# Patient Record
Sex: FEMALE | Race: WHITE | Employment: FULL TIME | URBAN - METROPOLITAN AREA
[De-identification: names, ages, dates, MRNs, and addresses within clinical notes are randomized per-mention and may not be internally consistent; named-entity substitution may affect disease eponyms.]

---

## 2017-03-23 RX ORDER — PROPRANOLOL HYDROCHLORIDE 80 MG/1
CAPSULE, EXTENDED RELEASE ORAL
Qty: 90 CAPSULE | Refills: 0 | Status: SHIPPED | OUTPATIENT
Start: 2017-03-23 | End: 2017-09-15 | Stop reason: SDUPTHER

## 2017-03-23 RX ORDER — NAPROXEN 500 MG/1
TABLET ORAL
Qty: 180 TABLET | Refills: 3 | Status: ON HOLD | OUTPATIENT
Start: 2017-03-23 | End: 2017-10-04 | Stop reason: HOSPADM

## 2017-05-22 RX ORDER — TRAZODONE HYDROCHLORIDE 100 MG/1
TABLET ORAL
Qty: 90 TABLET | Refills: 0 | Status: SHIPPED | OUTPATIENT
Start: 2017-05-22 | End: 2017-09-15 | Stop reason: SDUPTHER

## 2017-05-22 RX ORDER — HYDROCHLOROTHIAZIDE 25 MG/1
TABLET ORAL
Qty: 90 TABLET | Refills: 0 | Status: SHIPPED | OUTPATIENT
Start: 2017-05-22 | End: 2017-09-15 | Stop reason: SDUPTHER

## 2017-06-07 DIAGNOSIS — Z78.0 MENOPAUSE: ICD-10-CM

## 2017-06-07 RX ORDER — CLONIDINE HYDROCHLORIDE 0.2 MG/1
TABLET ORAL
Qty: 30 TABLET | Refills: 2 | Status: SHIPPED | OUTPATIENT
Start: 2017-06-07 | End: 2017-07-18 | Stop reason: SDUPTHER

## 2017-06-27 ENCOUNTER — HOSPITAL ENCOUNTER (OUTPATIENT)
Dept: MAMMOGRAPHY | Age: 52
Discharge: HOME | End: 2017-06-27
Attending: SURGERY | Admitting: SURGERY

## 2017-06-27 ENCOUNTER — OFFICE VISIT (OUTPATIENT)
Dept: SURGERY | Age: 52
End: 2017-06-27

## 2017-06-27 VITALS
WEIGHT: 238 LBS | BODY MASS INDEX: 40.63 KG/M2 | HEART RATE: 80 BPM | DIASTOLIC BLOOD PRESSURE: 62 MMHG | SYSTOLIC BLOOD PRESSURE: 104 MMHG | HEIGHT: 64 IN

## 2017-06-27 DIAGNOSIS — N64.4 BREAST PAIN: ICD-10-CM

## 2017-06-27 DIAGNOSIS — N60.19 FIBROCYSTIC BREAST, UNSPECIFIED LATERALITY: Primary | ICD-10-CM

## 2017-06-27 DIAGNOSIS — Z12.31 VISIT FOR SCREENING MAMMOGRAM: ICD-10-CM

## 2017-06-27 DIAGNOSIS — Z98.890 HISTORY OF BREAST SURGERY: ICD-10-CM

## 2017-06-27 DIAGNOSIS — N60.91 ATYPICAL DUCTAL HYPERPLASIA OF RIGHT BREAST: ICD-10-CM

## 2017-06-27 DIAGNOSIS — Z91.89 AT HIGH RISK FOR BREAST CANCER: ICD-10-CM

## 2017-06-27 DIAGNOSIS — Z13.9 SCREENING: ICD-10-CM

## 2017-06-27 DIAGNOSIS — Z12.39 SCREENING BREAST EXAMINATION: ICD-10-CM

## 2017-06-27 PROCEDURE — 99213 OFFICE O/P EST LOW 20 MIN: CPT | Performed by: SURGERY

## 2017-07-05 ENCOUNTER — PATIENT MESSAGE (OUTPATIENT)
Dept: INTERNAL MEDICINE CLINIC | Age: 52
End: 2017-07-05

## 2017-07-05 DIAGNOSIS — E04.2 MULTIPLE THYROID NODULES: Primary | ICD-10-CM

## 2017-07-18 ENCOUNTER — OFFICE VISIT (OUTPATIENT)
Dept: GYNECOLOGY | Age: 52
End: 2017-07-18

## 2017-07-18 VITALS
BODY MASS INDEX: 40.97 KG/M2 | RESPIRATION RATE: 17 BRPM | HEIGHT: 64 IN | WEIGHT: 240 LBS | SYSTOLIC BLOOD PRESSURE: 124 MMHG | DIASTOLIC BLOOD PRESSURE: 61 MMHG | TEMPERATURE: 97 F | HEART RATE: 75 BPM

## 2017-07-18 DIAGNOSIS — Z78.0 MENOPAUSE: ICD-10-CM

## 2017-07-18 DIAGNOSIS — N81.6 RECTOCELE: ICD-10-CM

## 2017-07-18 DIAGNOSIS — N81.11 MIDLINE CYSTOCELE: ICD-10-CM

## 2017-07-18 DIAGNOSIS — R10.33 PERIUMBILICAL ABDOMINAL PAIN: ICD-10-CM

## 2017-07-18 DIAGNOSIS — Z01.419 WELL WOMAN EXAM WITH ROUTINE GYNECOLOGICAL EXAM: Primary | ICD-10-CM

## 2017-07-18 PROCEDURE — 99396 PREV VISIT EST AGE 40-64: CPT | Performed by: OBSTETRICS & GYNECOLOGY

## 2017-07-18 RX ORDER — CLONIDINE HYDROCHLORIDE 0.2 MG/1
0.2 TABLET ORAL DAILY
Qty: 90 TABLET | Refills: 3 | Status: SHIPPED | OUTPATIENT
Start: 2017-07-18 | End: 2018-06-23 | Stop reason: SDUPTHER

## 2017-07-18 ASSESSMENT — ENCOUNTER SYMPTOMS
RESPIRATORY NEGATIVE: 1
ABDOMINAL PAIN: 1
EYES NEGATIVE: 1

## 2017-07-20 LAB
HPV COMMENT: NORMAL
HPV TYPE 16: NOT DETECTED
HPV TYPE 18: NOT DETECTED
HPVOH (OTHER TYPES): NOT DETECTED

## 2017-08-21 ENCOUNTER — OFFICE VISIT (OUTPATIENT)
Dept: SURGERY | Age: 52
End: 2017-08-21

## 2017-08-21 VITALS
TEMPERATURE: 98.6 F | BODY MASS INDEX: 40.97 KG/M2 | DIASTOLIC BLOOD PRESSURE: 68 MMHG | HEIGHT: 64 IN | SYSTOLIC BLOOD PRESSURE: 99 MMHG | WEIGHT: 240 LBS

## 2017-08-21 DIAGNOSIS — R10.33 PERIUMBILICAL ABDOMINAL PAIN: Primary | ICD-10-CM

## 2017-08-21 PROCEDURE — 99024 POSTOP FOLLOW-UP VISIT: CPT | Performed by: SURGERY

## 2017-08-22 DIAGNOSIS — R10.33 PERIUMBILICAL PAIN: Primary | ICD-10-CM

## 2017-09-01 ENCOUNTER — HOSPITAL ENCOUNTER (OUTPATIENT)
Dept: CT IMAGING | Age: 52
Discharge: HOME | End: 2017-09-01
Attending: SURGERY | Admitting: SURGERY

## 2017-09-01 ENCOUNTER — HOSPITAL ENCOUNTER (OUTPATIENT)
Dept: ULTRASOUND IMAGING | Age: 52
Discharge: HOME | End: 2017-09-01
Attending: INTERNAL MEDICINE | Admitting: INTERNAL MEDICINE

## 2017-09-01 DIAGNOSIS — E04.2 NONTOXIC MULTINODULAR GOITER: ICD-10-CM

## 2017-09-01 DIAGNOSIS — R10.33 PERIUMBILICAL PAIN: ICD-10-CM

## 2017-09-01 DIAGNOSIS — E04.2 MULTIPLE THYROID NODULES: ICD-10-CM

## 2017-09-06 ENCOUNTER — TELEPHONE (OUTPATIENT)
Dept: BARIATRICS/WEIGHT MGMT | Age: 52
End: 2017-09-06

## 2017-09-06 ENCOUNTER — TELEPHONE (OUTPATIENT)
Dept: SURGERY | Age: 52
End: 2017-09-06

## 2017-09-18 ENCOUNTER — OFFICE VISIT (OUTPATIENT)
Dept: SURGERY | Age: 52
End: 2017-09-18

## 2017-09-18 VITALS
DIASTOLIC BLOOD PRESSURE: 60 MMHG | TEMPERATURE: 98.1 F | BODY MASS INDEX: 40.97 KG/M2 | WEIGHT: 240 LBS | SYSTOLIC BLOOD PRESSURE: 122 MMHG | HEIGHT: 64 IN

## 2017-09-18 DIAGNOSIS — R10.33 PERIUMBILICAL PAIN: Primary | ICD-10-CM

## 2017-09-18 PROCEDURE — 99024 POSTOP FOLLOW-UP VISIT: CPT | Performed by: SURGERY

## 2017-09-25 ENCOUNTER — TELEPHONE (OUTPATIENT)
Dept: INTERNAL MEDICINE CLINIC | Age: 52
End: 2017-09-25

## 2017-09-26 RX ORDER — HYDROCHLOROTHIAZIDE 25 MG/1
TABLET ORAL
Qty: 90 TABLET | Refills: 3 | Status: SHIPPED | OUTPATIENT
Start: 2017-09-26 | End: 2018-11-19 | Stop reason: SDUPTHER

## 2017-09-26 RX ORDER — OMEPRAZOLE 40 MG/1
CAPSULE, DELAYED RELEASE ORAL
Qty: 90 CAPSULE | Refills: 0 | Status: SHIPPED | OUTPATIENT
Start: 2017-09-26 | End: 2017-11-09 | Stop reason: SDUPTHER

## 2017-09-26 RX ORDER — GABAPENTIN 100 MG/1
CAPSULE ORAL
Qty: 180 CAPSULE | Refills: 3 | Status: SHIPPED | OUTPATIENT
Start: 2017-09-26 | End: 2018-09-15 | Stop reason: SDUPTHER

## 2017-09-26 RX ORDER — FENOFIBRATE 160 MG/1
TABLET ORAL
Qty: 90 TABLET | Refills: 3 | Status: SHIPPED | OUTPATIENT
Start: 2017-09-26 | End: 2018-11-19 | Stop reason: SDUPTHER

## 2017-09-26 RX ORDER — TRAZODONE HYDROCHLORIDE 100 MG/1
TABLET ORAL
Qty: 90 TABLET | Refills: 3 | Status: SHIPPED | OUTPATIENT
Start: 2017-09-26 | End: 2018-11-19 | Stop reason: SDUPTHER

## 2017-09-26 RX ORDER — PROPRANOLOL HYDROCHLORIDE 80 MG/1
CAPSULE, EXTENDED RELEASE ORAL
Qty: 90 CAPSULE | Refills: 0 | Status: SHIPPED | OUTPATIENT
Start: 2017-09-26 | End: 2017-11-09 | Stop reason: SDUPTHER

## 2017-10-03 PROBLEM — K52.9 COLITIS, ENTERITIS, AND GASTROENTERITIS OF PRESUMED INFECTIOUS ORIGIN: Status: ACTIVE | Noted: 2017-10-03

## 2017-10-25 ENCOUNTER — OFFICE VISIT (OUTPATIENT)
Dept: INTERNAL MEDICINE CLINIC | Age: 52
End: 2017-10-25

## 2017-10-25 VITALS
HEART RATE: 72 BPM | RESPIRATION RATE: 12 BRPM | WEIGHT: 234 LBS | HEIGHT: 64 IN | BODY MASS INDEX: 39.95 KG/M2 | DIASTOLIC BLOOD PRESSURE: 73 MMHG | SYSTOLIC BLOOD PRESSURE: 125 MMHG

## 2017-10-25 DIAGNOSIS — I10 BENIGN ESSENTIAL HTN: ICD-10-CM

## 2017-10-25 DIAGNOSIS — K21.9 GASTROESOPHAGEAL REFLUX DISEASE WITHOUT ESOPHAGITIS: ICD-10-CM

## 2017-10-25 DIAGNOSIS — G35 MULTIPLE SCLEROSIS (HCC): ICD-10-CM

## 2017-10-25 DIAGNOSIS — F33.1 MAJOR DEPRESSIVE DISORDER, RECURRENT EPISODE, MODERATE (HCC): ICD-10-CM

## 2017-10-25 DIAGNOSIS — E78.5 DYSLIPIDEMIA: Primary | ICD-10-CM

## 2017-10-25 DIAGNOSIS — E55.9 VITAMIN D DEFICIENCY: ICD-10-CM

## 2017-10-25 DIAGNOSIS — E83.42 HYPOMAGNESEMIA: ICD-10-CM

## 2017-10-25 DIAGNOSIS — R10.9 RIGHT SIDED ABDOMINAL PAIN: ICD-10-CM

## 2017-10-25 DIAGNOSIS — G47.33 OSA (OBSTRUCTIVE SLEEP APNEA): ICD-10-CM

## 2017-10-25 DIAGNOSIS — M79.7 FIBROMYALGIA: ICD-10-CM

## 2017-10-25 PROBLEM — K52.9 COLITIS, ENTERITIS, AND GASTROENTERITIS OF PRESUMED INFECTIOUS ORIGIN: Status: RESOLVED | Noted: 2017-10-03 | Resolved: 2017-10-25

## 2017-10-25 LAB
A/G RATIO: 1.1 (ref 1.1–2.2)
ALBUMIN SERPL-MCNC: 4.2 G/DL (ref 3.4–5)
ALP BLD-CCNC: 78 U/L (ref 40–129)
ALT SERPL-CCNC: 38 U/L (ref 10–40)
ANION GAP SERPL CALCULATED.3IONS-SCNC: 14 MMOL/L (ref 3–16)
AST SERPL-CCNC: 36 U/L (ref 15–37)
BASOPHILS ABSOLUTE: 0.1 K/UL (ref 0–0.2)
BASOPHILS RELATIVE PERCENT: 1.5 %
BILIRUB SERPL-MCNC: <0.2 MG/DL (ref 0–1)
BUN BLDV-MCNC: 14 MG/DL (ref 7–20)
CALCIUM SERPL-MCNC: 9.9 MG/DL (ref 8.3–10.6)
CHLORIDE BLD-SCNC: 101 MMOL/L (ref 99–110)
CHOLESTEROL, TOTAL: 163 MG/DL (ref 0–199)
CO2: 26 MMOL/L (ref 21–32)
CREAT SERPL-MCNC: 0.9 MG/DL (ref 0.6–1.1)
EOSINOPHILS ABSOLUTE: 0.1 K/UL (ref 0–0.6)
EOSINOPHILS RELATIVE PERCENT: 1.8 %
GFR AFRICAN AMERICAN: >60
GFR NON-AFRICAN AMERICAN: >60
GLOBULIN: 3.8 G/DL
GLUCOSE BLD-MCNC: 93 MG/DL (ref 70–99)
HCT VFR BLD CALC: 36.6 % (ref 36–48)
HDLC SERPL-MCNC: 32 MG/DL (ref 40–60)
HEMOGLOBIN: 11.9 G/DL (ref 12–16)
LDL CHOLESTEROL CALCULATED: 98 MG/DL
LYMPHOCYTES ABSOLUTE: 2.6 K/UL (ref 1–5.1)
LYMPHOCYTES RELATIVE PERCENT: 51.6 %
MAGNESIUM: 1.9 MG/DL (ref 1.8–2.4)
MCH RBC QN AUTO: 27.5 PG (ref 26–34)
MCHC RBC AUTO-ENTMCNC: 32.4 G/DL (ref 31–36)
MCV RBC AUTO: 84.9 FL (ref 80–100)
MONOCYTES ABSOLUTE: 0.6 K/UL (ref 0–1.3)
MONOCYTES RELATIVE PERCENT: 12.6 %
NEUTROPHILS ABSOLUTE: 1.6 K/UL (ref 1.7–7.7)
NEUTROPHILS RELATIVE PERCENT: 32.5 %
PDW BLD-RTO: 14.2 % (ref 12.4–15.4)
PLATELET # BLD: 237 K/UL (ref 135–450)
PMV BLD AUTO: 8.1 FL (ref 5–10.5)
POTASSIUM SERPL-SCNC: 4.4 MMOL/L (ref 3.5–5.1)
RBC # BLD: 4.31 M/UL (ref 4–5.2)
SODIUM BLD-SCNC: 141 MMOL/L (ref 136–145)
TOTAL PROTEIN: 8 G/DL (ref 6.4–8.2)
TRIGL SERPL-MCNC: 163 MG/DL (ref 0–150)
TSH SERPL DL<=0.05 MIU/L-ACNC: 3.77 UIU/ML (ref 0.27–4.2)
VITAMIN D 25-HYDROXY: 45.6 NG/ML
VLDLC SERPL CALC-MCNC: 33 MG/DL
WBC # BLD: 5 K/UL (ref 4–11)

## 2017-10-25 PROCEDURE — 99214 OFFICE O/P EST MOD 30 MIN: CPT | Performed by: INTERNAL MEDICINE

## 2017-10-25 ASSESSMENT — PATIENT HEALTH QUESTIONNAIRE - PHQ9
2. FEELING DOWN, DEPRESSED OR HOPELESS: 0
SUM OF ALL RESPONSES TO PHQ QUESTIONS 1-9: 0
SUM OF ALL RESPONSES TO PHQ9 QUESTIONS 1 & 2: 0
1. LITTLE INTEREST OR PLEASURE IN DOING THINGS: 0

## 2017-10-25 NOTE — PROGRESS NOTES
Component Value Date    LDLCALC 78 07/12/2016   patient remains compliant with fenofibrate. She is not on a statin. Her last lipid profile was in July. Triglycerides were elevated to 266     MS-- She is getting Plegridy (Peginterferon). She is seeing Dr. Jes Almonte with [facility]. She feels she is doing well. She denies exacerbations. She can get vertigo, facial twitching, visual disturbance.          Past Medical History:   Diagnosis Date    Anxiety 1/14/2014    Asthma     seasonal asthma    Cellulitis, lip     mrsa    Cervical spinal stenosis     Clotting disorder (HCC)     blood clot in leg as teenager bcp    Crohn's disease ([address]     DDD (degenerative disc disease), cervical     DDD (degenerative disc disease), lumbar     Depression 1/14/2014    DVT, lower extremity ([address] 1979    Due to birth control pills    GERD (gastroesophageal reflux disease)     Hormone disorder     Hx MRSA infection     pt states she has had several episodes of mrsa infections recently on stomach, which was tx'ed w/ antibiotics and is now (4/2013) scabbed over      Hyperlipidemia     Hypertension     Insomnia     interruption insomnia    Migraine     MS (multiple sclerosis) ([address] 6/2010    Multinodular goiter     Nausea & vomiting     Osteoarthritis     hips and shoulders    PONV (postoperative nausea and vomiting)     severe    Rash     Right leg DVT ([address]     10years of age   Kim Stack Sleep apnea     CPAP set at 15, 11    Spondylolisthesis of lumbar region     Thyroid nodule 6/13/2013    Urinary incontinence     Venous insufficiency 03/08/13    insufficiency & reflux of legs    Weakness of left arm     DUE TO MS       Review of Systems -   As per HPI      OBJECTIVE:  /73   Pulse 72   Resp 12   Ht 5' 4\" (1.626 m)   Wt 234 lb (106.1 kg)   BMI 40.17 kg/m²     Wt Readings from Last 3 Encounters:   10/25/17 234 lb (106.1 kg)   10/03/17 230 lb (104.3 kg)   09/18/17 240 lb (108.9 kg)       GEN: NAD, A&O, Non-toxic  HEENT: NC/AT, ADELINA, EOMI, Oral cavity Clear,  TM's NL. Tongue midline. Oral mucosa without lesions   NECK: Supple. No thyromegaly or nodules. No soft tissue masses. No JVD. Trachea midline. LYMPH: No C/SC nodes. CV: Regular rhythm. Rate normal.  No murmurs. No ectopy. PULM: CTA, no wheezing. No rales  EXT: No Edema  GI: Abdomen is soft, non-distended. No reproducible tenderness. The abdomen is obese. No obvious masses noted. VASC:  No carotid bruits. Pedal Pulses symmetric  MS: Symmetric tender points throughout consistent with fibromyalgia. SKIN:  No rashes of concern  NEURO: no focal or lateralizing deficits. Cranial nerves II through XII are intact. ASSESSMENT/PLAN:    1. Dyslipidemia  Condition stability is uncertain. Overdue for lab  Continue fenofibrate. Continue lifestyle approach with low-fat and low carb diet  - Comprehensive Metabolic Panel  - Lipid Panel  - TSH without Reflex    2. Vitamin D deficiency    - Vitamin D 25 Hydroxy    3. Benign essential HTN  Condition is well controlled  Continue current medications  Continue no added salt diet  Continue to observe for hypotensive symptoms  - Comprehensive Metabolic Panel  - Lipid Panel  - CBC Auto Differential    4. Multiple sclerosis ([address]  Condition is stable  Follow-up with neurology as scheduled    5. Fibromyalgia  Condition is improved since starting Cymbalta  Continue Cymbalta  Continued to increase physical activity. Try to get back to swimming    6. DEVIN (obstructive sleep apnea)  Condition is well controlled  Follow-up with sleep specialist as scheduled  Continue current CPAP settings    7. Major depressive disorder, recurrent episode, moderate (HCC)  Condition well controlled despite multiple stressors  Continue current medication    8. Right sided abdominal pain  Etiology is unclear  Recommended she follow up with Dr. Mckeon her gastroenterologist    9. Hypomagnesemia    - MAGNESIUM    10.  GERD  Condition is controlled  Continue Prilosec for now  We'll need to discuss possibly downgrading therapy to H2 blocker.   She would like to wait for now

## 2017-10-25 NOTE — PATIENT INSTRUCTIONS
Follow-up with gastroenterologist should the right sided abdominal discomfort continue. You may be in need of an upper endoscopy or colonoscopy. Also since she had a partial small bowel obstruction I think he would be interested in seeing you. Patient Education        Learning About High Blood Pressure  What is high blood pressure? Blood pressure is a measure of how hard the blood pushes against the walls of your arteries. It's normal for blood pressure to go up and down throughout the day, but if it stays up, you have high blood pressure. Another name for high blood pressure is hypertension. Two numbers tell you your blood pressure. The first number is the systolic pressure. It shows how hard the blood pushes when your heart is pumping. The second number is the diastolic pressure. It shows how hard the blood pushes between heartbeats, when your heart is relaxed and filling with blood. A blood pressure of less than 120/80 (say \"120 over 80\") is ideal for an adult. High blood pressure is 140/90 or higher. You have high blood pressure if your top number is 140 or higher or your bottom number is 90 or higher, or both. Many people fall into the category in between, called prehypertension. People with prehypertension need to make lifestyle changes to bring their blood pressure down and help prevent or delay high blood pressure. What happens when you have high blood pressure? · Blood flows through your arteries with too much force. Over time, this damages the walls of your arteries. But you can't feel it. High blood pressure usually doesn't cause symptoms. · Fat and calcium start to build up in your arteries. This buildup is called plaque. Plaque makes your arteries narrower and stiffer. Blood can't flow through them as easily. · This lack of good blood flow starts to damage some of the organs in your body.  This can lead to problems such as coronary artery disease and heart attack, heart failure, stroke, kidney failure, and eye damage. How can you prevent high blood pressure? · Stay at a healthy weight. · Try to limit how much sodium you eat to less than 2,300 milligrams (mg) a day. If you limit your sodium to 1,500 mg a day, you can lower your blood pressure even more. ¨ Buy foods that are labeled \"unsalted,\" \"sodium-free,\" or \"low-sodium. \" Foods labeled \"reduced-sodium\" and \"light sodium\" may still have too much sodium. ¨ Flavor your food with garlic, lemon juice, onion, vinegar, herbs, and spices instead of salt. Do not use soy sauce, steak sauce, onion salt, garlic salt, mustard, or ketchup on your food. ¨ Use less salt (or none) when recipes call for it. You can often use half the salt a recipe calls for without losing flavor. · Be physically active. Get at least 30 minutes of exercise on most days of the week. Walking is a good choice. You also may want to do other activities, such as running, swimming, cycling, or playing tennis or team sports. · Limit alcohol to 2 drinks a day for men and 1 drink a day for women. · Eat plenty of fruits, vegetables, and low-fat dairy products. Eat less saturated and total fats. How is high blood pressure treated? · Your doctor will suggest making lifestyle changes. For example, your doctor may ask you to eat healthy foods, quit smoking, lose extra weight, and be more active. · If lifestyle changes don't help enough or your blood pressure is very high, you will have to take medicine every day. Follow-up care is a key part of your treatment and safety. Be sure to make and go to all appointments, and call your doctor if you are having problems. It's also a good idea to know your test results and keep a list of the medicines you take. Where can you learn more? Go to https://araceli.healthEmergent Game Technologies. org and sign in to your [employer] account. Enter P501 in the [employer] box to learn more about \"Learning About High Blood Pressure. \"     If you do not have an account, please click on the \"Sign Up Now\" link. Current as of: March 23, 2016  Content Version: 11.3  © 6967-7272 [employer]. Care instructions adapted under license by [facility] ([facility]). If you have questions about a medical condition or this instruction, always ask your healthcare professional. [address] any warranty or liability for your use of this information.

## 2017-11-02 DIAGNOSIS — K21.9 GASTROESOPHAGEAL REFLUX DISEASE, ESOPHAGITIS PRESENCE NOT SPECIFIED: Primary | ICD-10-CM

## 2018-03-09 ENCOUNTER — HOSPITAL ENCOUNTER (OUTPATIENT)
Dept: MRI IMAGING | Age: 53
Discharge: HOME | End: 2018-03-09
Attending: SURGERY | Admitting: SURGERY

## 2018-03-09 DIAGNOSIS — Z91.89 OTHER SPECIFIED PERSONAL RISK FACTORS, NOT ELSEWHERE CLASSIFIED: ICD-10-CM

## 2018-03-16 ENCOUNTER — HOSPITAL ENCOUNTER (OUTPATIENT)
Dept: MRI IMAGING | Age: 53
Discharge: HOME | End: 2018-03-16
Attending: SURGERY | Admitting: SURGERY

## 2018-03-16 DIAGNOSIS — Z91.89 AT HIGH RISK FOR BREAST CANCER: ICD-10-CM

## 2018-03-16 DIAGNOSIS — N60.91 BENIGN MAMMARY DYSPLASIA OF RIGHT BREAST: ICD-10-CM

## 2018-03-16 DIAGNOSIS — N60.91 ATYPICAL DUCTAL HYPERPLASIA OF RIGHT BREAST: ICD-10-CM

## 2018-03-23 RX ORDER — OMEPRAZOLE 40 MG/1
CAPSULE, DELAYED RELEASE ORAL
Qty: 90 CAPSULE | Refills: 0 | Status: SHIPPED | OUTPATIENT
Start: 2018-03-23 | End: 2018-07-02 | Stop reason: SDUPTHER

## 2018-03-23 RX ORDER — PROPRANOLOL HYDROCHLORIDE 80 MG/1
CAPSULE, EXTENDED RELEASE ORAL
Qty: 90 CAPSULE | Refills: 0 | Status: SHIPPED | OUTPATIENT
Start: 2018-03-23 | End: 2018-07-02 | Stop reason: SDUPTHER

## 2018-03-23 RX ORDER — NAPROXEN 500 MG/1
TABLET ORAL
Qty: 180 TABLET | Refills: 3 | OUTPATIENT
Start: 2018-03-23

## 2018-06-06 ENCOUNTER — OFFICE VISIT (OUTPATIENT)
Dept: INTERNAL MEDICINE CLINIC | Age: 53
End: 2018-06-06

## 2018-06-06 VITALS
RESPIRATION RATE: 14 BRPM | BODY MASS INDEX: 41.06 KG/M2 | WEIGHT: 239.2 LBS | DIASTOLIC BLOOD PRESSURE: 68 MMHG | SYSTOLIC BLOOD PRESSURE: 116 MMHG | HEART RATE: 60 BPM

## 2018-06-06 DIAGNOSIS — K21.9 GASTROESOPHAGEAL REFLUX DISEASE WITHOUT ESOPHAGITIS: ICD-10-CM

## 2018-06-06 DIAGNOSIS — F43.23 ADJUSTMENT DISORDER WITH MIXED ANXIETY AND DEPRESSED MOOD: ICD-10-CM

## 2018-06-06 DIAGNOSIS — I10 BENIGN ESSENTIAL HTN: Primary | ICD-10-CM

## 2018-06-06 DIAGNOSIS — F33.1 MAJOR DEPRESSIVE DISORDER, RECURRENT EPISODE, MODERATE (HCC): ICD-10-CM

## 2018-06-06 DIAGNOSIS — K80.20 CALCULUS OF GALLBLADDER WITHOUT CHOLECYSTITIS WITHOUT OBSTRUCTION: ICD-10-CM

## 2018-06-06 DIAGNOSIS — M79.7 FIBROMYALGIA: ICD-10-CM

## 2018-06-06 DIAGNOSIS — J45.20 MILD INTERMITTENT ASTHMA WITHOUT COMPLICATION: ICD-10-CM

## 2018-06-06 DIAGNOSIS — E78.5 DYSLIPIDEMIA: ICD-10-CM

## 2018-06-06 LAB
A/G RATIO: 1.2 (ref 1.1–2.2)
ALBUMIN SERPL-MCNC: 4.2 G/DL (ref 3.4–5)
ALP BLD-CCNC: 77 U/L (ref 40–129)
ALT SERPL-CCNC: 70 U/L (ref 10–40)
ANION GAP SERPL CALCULATED.3IONS-SCNC: 17 MMOL/L (ref 3–16)
AST SERPL-CCNC: 68 U/L (ref 15–37)
BILIRUB SERPL-MCNC: <0.2 MG/DL (ref 0–1)
BUN BLDV-MCNC: 21 MG/DL (ref 7–20)
CALCIUM SERPL-MCNC: 9.7 MG/DL (ref 8.3–10.6)
CHLORIDE BLD-SCNC: 103 MMOL/L (ref 99–110)
CHOLESTEROL, TOTAL: 170 MG/DL (ref 0–199)
CO2: 24 MMOL/L (ref 21–32)
CREAT SERPL-MCNC: 0.8 MG/DL (ref 0.6–1.1)
GFR AFRICAN AMERICAN: >60
GFR NON-AFRICAN AMERICAN: >60
GLOBULIN: 3.4 G/DL
GLUCOSE BLD-MCNC: 98 MG/DL (ref 70–99)
HDLC SERPL-MCNC: 37 MG/DL (ref 40–60)
LDL CHOLESTEROL CALCULATED: 99 MG/DL
POTASSIUM SERPL-SCNC: 4.5 MMOL/L (ref 3.5–5.1)
SODIUM BLD-SCNC: 144 MMOL/L (ref 136–145)
TOTAL PROTEIN: 7.6 G/DL (ref 6.4–8.2)
TRIGL SERPL-MCNC: 170 MG/DL (ref 0–150)
VLDLC SERPL CALC-MCNC: 34 MG/DL

## 2018-06-06 PROCEDURE — 99214 OFFICE O/P EST MOD 30 MIN: CPT | Performed by: INTERNAL MEDICINE

## 2018-06-19 ENCOUNTER — PATIENT MESSAGE (OUTPATIENT)
Dept: INTERNAL MEDICINE CLINIC | Age: 53
End: 2018-06-19

## 2018-06-19 ENCOUNTER — HOSPITAL ENCOUNTER (OUTPATIENT)
Dept: ULTRASOUND IMAGING | Age: 53
Discharge: HOME | End: 2018-06-19
Admitting: INTERNAL MEDICINE

## 2018-06-19 DIAGNOSIS — K80.10 CALCULUS OF GALLBLADDER WITH CHOLECYSTITIS WITHOUT BILIARY OBSTRUCTION, UNSPECIFIED CHOLECYSTITIS ACUITY: Primary | ICD-10-CM

## 2018-06-19 DIAGNOSIS — K80.20 CALCULUS OF GALLBLADDER WITHOUT CHOLECYSTITIS WITHOUT OBSTRUCTION: ICD-10-CM

## 2018-06-23 DIAGNOSIS — Z78.0 MENOPAUSE: ICD-10-CM

## 2018-06-26 RX ORDER — CLONIDINE HYDROCHLORIDE 0.2 MG/1
TABLET ORAL
Qty: 90 TABLET | Refills: 3 | Status: SHIPPED | OUTPATIENT
Start: 2018-06-26 | End: 2019-04-15 | Stop reason: SDUPTHER

## 2018-07-03 RX ORDER — PROPRANOLOL HYDROCHLORIDE 80 MG/1
CAPSULE, EXTENDED RELEASE ORAL
Qty: 90 CAPSULE | Refills: 1 | Status: SHIPPED | OUTPATIENT
Start: 2018-07-03 | End: 2018-12-13 | Stop reason: SDUPTHER

## 2018-07-03 RX ORDER — NAPROXEN 500 MG/1
TABLET ORAL
Qty: 180 TABLET | Refills: 1 | Status: SHIPPED | OUTPATIENT
Start: 2018-07-03 | End: 2018-12-13 | Stop reason: SDUPTHER

## 2018-07-03 RX ORDER — OMEPRAZOLE 40 MG/1
CAPSULE, DELAYED RELEASE ORAL
Qty: 180 CAPSULE | Refills: 1 | Status: SHIPPED | OUTPATIENT
Start: 2018-07-03 | End: 2018-11-19 | Stop reason: SDUPTHER

## 2018-08-24 ENCOUNTER — OFFICE VISIT (OUTPATIENT)
Dept: SURGERY | Age: 53
End: 2018-08-24

## 2018-08-24 VITALS
BODY MASS INDEX: 40.29 KG/M2 | DIASTOLIC BLOOD PRESSURE: 89 MMHG | SYSTOLIC BLOOD PRESSURE: 138 MMHG | HEIGHT: 64 IN | WEIGHT: 236 LBS | TEMPERATURE: 98.3 F

## 2018-08-24 DIAGNOSIS — K80.20 SYMPTOMATIC CHOLELITHIASIS: Primary | ICD-10-CM

## 2018-08-24 PROCEDURE — 99214 OFFICE O/P EST MOD 30 MIN: CPT | Performed by: SURGERY

## 2018-08-24 NOTE — LETTER
[facility] of [address] [place] [address] [phone]  f [phone]                                                                                                      Shereen Canseco MD [facility]                                        SURGERY ORDER   -- Time of order -- 18    9:22 AM    Facility:   [facility] # _________________                                  Scheduled by: ____________    Surgery Date & Time:                                                                              Pt arrival:      Patient Name:  Yash Light     :  1965 PCP:  Goapl Schmidt [address] [place] [address] Ph:    [phone] (home) [phone] (work)                                                    PROCEDURE:  Laparoscopic cholecystectomy with gram 99 [id number]    DIAGNOSIS:  Symptomatic cholelithiasis K80.20    Anesthesia: _General  Time Needed:  1 hour   Pt Position:  supine         Outpatient __x__ Admit ______  Assist._____  Pre-Op H & P to be done by: PCP     Labs Needed:   CBC ___  PT/PTT___ INR ____  CMP ___ EKG ___   Urine Hcg ___    Cardiac Clearance ___  (if Clyda Pall to be done by) __________________    Patient to meet with Anesthesiology prior to surgery ___no__     Medications to be stopped 5 days before surgery: _________  Additional / Special Orders:     **Ancef 2 gm IV OCTOR   (If patient weight is > 120 kg, give 3 gm IV OCTOR)                                                                                                                    Shereen Canseco  [address]   Fax   [phone]            Date Of Procedure:    PATIENT:       Yash Light                    :  1965      No Known Allergies    No.   PHYSICIAN ORDERS   HUC/  RN      ORDERS WITH CHECK BOXES MUST BE SELECTED. ALL OTHER ORDERS WILL BE AUTOMATICALLY INITIATED.            Date / Time of Order:   18   1:22 PM · Any paperwork needing completion for either your employer or insurance company can be faxed, mailed or dropped off at our office to my attention. Please allow 7 business days for the paperwork to be completed. You will be contacted once it has been completed at which time you will be able to pick it up or have it mailed. · NOTE: Due to HIPPA policy, completed paperwork can not be faxed and per [facility] of 9-4-29, Fees for form completion may apply. If you have any questions, please feel free to call me at the number above.

## 2018-08-24 NOTE — PROGRESS NOTES
PATIENT NAME: Wayne Kirkster OF BIRTH: 1965     TODAY'S DATE: 8/24/2018    Reason for Visit:  Postprandial pain    Requesting Physician:  Dr. Alicia Jacobson:              The patient is a 45 y.o. female who presents with complaints of postprandial pain. Worse with fattier foods and typically associated with nausea. No pain presently. Chief Complaint   Patient presents with    Surgical Consult     Gallbladder ,nausea everyday with RUQ pain       REVIEW OF SYSTEMS:  CONSTITUTIONAL:  negative  HEENT:  negative  RESPIRATORY:  negative  CARDIOVASCULAR:  negative  GASTROINTESTINAL:  negative except for abdominal pain  GENITOURINARY:  negative  HEMATOLOGIC/LYMPHATIC:  negative  NEUROLOGICAL:  negative    PHYSICAL EXAM:  VITALS:    /89   Temp 98.3 °F (36.8 °C)   Ht 5' 4\" (1.626 m)   Wt 236 lb (107 kg)   BMI 40.51 kg/m²     CONSTITUTIONAL:  alert, no apparent distress and obese  EYES:  sclera clear  ENT:  normocepalic, without obvious abnormality  NECK:  supple, symmetrical, trachea midline and no carotid bruits  LUNGS:  clear to auscultation  CARDIOVASCULAR:  regular rate and rhythm and no murmur noted  ABDOMEN:  Umbilical incision well healed, normal bowel sounds, soft, non-distended, non-tender, voluntary guarding absent, no masses palpated  MUSCULOSKELETAL:  0+ pitting edema lower extremities  NEUROLOGIC:  Mental Status Exam:  Level of Alertness:   awake  Orientation:   person, place, time  SKIN:  no bruising or bleeding and normal skin color, texture, turgor    Radiology Review:   1. Findings are equivocal for cholecystitis, as there are gallstones and a   positive sonographic Proctor's sign.  However, there is no evidence of   gallbladder wall thickening or pericholecystic fluid.  If cholecystitis   remains of clinical concern, suggest further characterization with a nuclear   medicine HIDA scan. 2. Diffuse hepatic steatosis.    3. Normal sonographic appearance of the common bile duct, right kidney, and   pancreas. IMPRESSION/RECOMMENDATIONS:    The patient has findings consistent with symptomatic cholelithiasis. As a result, we will proceed with laparoscopic cholecystectomy. The risks, benefits, and expected recovery were discussed with the patient and she wishes to proceed.      Cheko Herman

## 2018-08-24 NOTE — LETTER
[facility] [employer] of [address] [place] [address]  Phone: [phone]  Fax: [phone]    Anat Elliott MD        August 24, 2018     Avi Davis, [address] [place] [address]    Patient: Luli Hoang  MR Number: [id number]  YOB: 1965  Date of Visit: 8/24/2018    Dear Dr. Avi Davis:    Thank you for the request for consultation for Isabel Benites to me for the evaluation of postprandial pain. Below are the relevant portions of my assessment and plan of care. The patient has findings consistent with symptomatic cholelithiasis. As a result, we will proceed with laparoscopic cholecystectomy. If you have questions, please do not hesitate to call me. I look forward to following Ok Serrano along with you.     Sincerely,        Anta Elliott MD

## 2018-08-28 ENCOUNTER — TELEPHONE (OUTPATIENT)
Dept: SURGERY | Age: 53
End: 2018-08-28

## 2018-09-17 RX ORDER — BUDESONIDE AND FORMOTEROL FUMARATE DIHYDRATE 160; 4.5 UG/1; UG/1
AEROSOL RESPIRATORY (INHALATION)
Qty: 3 INHALER | Refills: 3 | Status: SHIPPED | OUTPATIENT
Start: 2018-09-17 | End: 2019-06-27 | Stop reason: SDUPTHER

## 2018-09-17 RX ORDER — GABAPENTIN 100 MG/1
CAPSULE ORAL
Qty: 180 CAPSULE | Refills: 1 | Status: SHIPPED | OUTPATIENT
Start: 2018-09-17 | End: 2019-02-07 | Stop reason: SDUPTHER

## 2018-11-13 ENCOUNTER — OFFICE VISIT (OUTPATIENT)
Dept: INTERNAL MEDICINE CLINIC | Age: 53
End: 2018-11-13
Payer: COMMERCIAL

## 2018-11-13 VITALS
DIASTOLIC BLOOD PRESSURE: 60 MMHG | HEART RATE: 72 BPM | OXYGEN SATURATION: 96 % | TEMPERATURE: 98.9 F | RESPIRATION RATE: 16 BRPM | SYSTOLIC BLOOD PRESSURE: 101 MMHG

## 2018-11-13 DIAGNOSIS — J06.9 VIRAL URI: Primary | ICD-10-CM

## 2018-11-13 DIAGNOSIS — J45.21 MILD INTERMITTENT ASTHMA WITH EXACERBATION: ICD-10-CM

## 2018-11-13 PROCEDURE — 99213 OFFICE O/P EST LOW 20 MIN: CPT | Performed by: INTERNAL MEDICINE

## 2018-11-13 RX ORDER — PROMETHAZINE HYDROCHLORIDE AND CODEINE PHOSPHATE 6.25; 1 MG/5ML; MG/5ML
5 SYRUP ORAL EVERY 4 HOURS PRN
Qty: 150 ML | Refills: 0 | Status: SHIPPED | OUTPATIENT
Start: 2018-11-13 | End: 2018-11-20

## 2018-11-13 RX ORDER — METHYLPREDNISOLONE 4 MG/1
4 TABLET ORAL SEE ADMIN INSTRUCTIONS
Qty: 1 KIT | Refills: 1 | Status: SHIPPED | OUTPATIENT
Start: 2018-11-13 | End: 2018-11-19

## 2018-11-13 RX ORDER — GUAIFENESIN AND DEXTROMETHORPHAN HYDROBROMIDE 600; 30 MG/1; MG/1
1 TABLET, EXTENDED RELEASE ORAL 2 TIMES DAILY
Qty: 28 TABLET | Refills: 0 | COMMUNITY
Start: 2018-11-13 | End: 2018-12-27 | Stop reason: ALTCHOICE

## 2018-11-13 RX ORDER — FLUTICASONE PROPIONATE 50 MCG
2 SPRAY, SUSPENSION (ML) NASAL DAILY
Qty: 1 BOTTLE | Refills: 2 | Status: ON HOLD | OUTPATIENT
Start: 2018-11-13 | End: 2020-03-04

## 2018-11-13 NOTE — PATIENT INSTRUCTIONS
quick-relief medicine with you at all times. · Talk to your doctor before using other medicines. Some medicines, such as aspirin, can cause asthma attacks in some people. · If you have a peak flow meter, use it to check how well you are breathing. This can help you predict when an asthma attack is going to occur. Then you can take medicine to prevent the asthma attack or make it less severe. · Do not smoke or allow others to smoke around you. Avoid smoky places. Smoking makes asthma worse. If you need help quitting, talk to your doctor about stop-smoking programs and medicines. These can increase your chances of quitting for good. · Learn what triggers an asthma attack for you, and avoid the triggers when you can. Common triggers include colds, smoke, air pollution, dust, pollen, mold, pets, cockroaches, stress, and cold air. · Avoid colds and the flu. Get a pneumococcal vaccine shot. If you have had one before, ask your doctor if you need a second dose. Get a flu vaccine every fall. If you must be around people with colds or the flu, wash your hands often. When should you call for help? Call 911 anytime you think you may need emergency care. For example, call if:    · You have severe trouble breathing.    Call your doctor now or seek immediate medical care if:    · Your symptoms do not get better after you have followed your asthma action plan.     · You have new or worse trouble breathing.     · Your coughing and wheezing get worse.     · You cough up dark brown or bloody mucus (sputum).     · You have a new or higher fever.    Watch closely for changes in your health, and be sure to contact your doctor if:    · You need to use quick-relief medicine on more than 2 days a week (unless it is just for exercise).     · You cough more deeply or more often, especially if you notice more mucus or a change in the color of your mucus.     · You are not getting better as expected. Where can you learn more?   Go to over-the-counter pain medicine, such as acetaminophen (Tylenol), ibuprofen (Advil, Motrin), or naproxen (Aleve), as needed for pain and fever. Read and follow all instructions on the label. Do not give aspirin to anyone younger than 20. It has been linked to Reye syndrome, a serious illness. · Drink plenty of fluids, enough so that your urine is light yellow or clear like water. Hot fluids, such as tea or soup, may help relieve congestion in your nose and throat. If you have kidney, heart, or liver disease and have to limit fluids, talk with your doctor before you increase the amount of fluids you drink. · Try to clear mucus from your lungs by breathing deeply and coughing. · Gargle with warm salt water once an hour. This can help reduce swelling and throat pain. Use 1 teaspoon of salt mixed in 1 cup of warm water. · Do not smoke or allow others to smoke around you. If you need help quitting, talk to your doctor about stop-smoking programs and medicines. These can increase your chances of quitting for good. To avoid spreading the virus  · Cough or sneeze into a tissue. Then throw the tissue away. · If you don't have a tissue, use your hand to cover your cough or sneeze. Then clean your hand. You can also cough into your sleeve. · Wash your hands often. Use soap and warm water. Wash for 15 to 20 seconds each time. · If you don't have soap and water near you, you can clean your hands with alcohol wipes or gel. When should you call for help? Call your doctor now or seek immediate medical care if:    · You have a new or higher fever.     · Your fever lasts more than 48 hours.     · You have trouble breathing.     · You have a fever with a stiff neck or a severe headache.     · You are sensitive to light.     · You feel very sleepy or confused.    Watch closely for changes in your health, and be sure to contact your doctor if:    · You do not get better as expected. Where can you learn more?   Go to

## 2018-11-19 RX ORDER — OMEPRAZOLE 40 MG/1
CAPSULE, DELAYED RELEASE ORAL
Qty: 180 CAPSULE | Refills: 1 | Status: SHIPPED | OUTPATIENT
Start: 2018-11-19 | End: 2019-04-15 | Stop reason: SDUPTHER

## 2018-11-19 RX ORDER — HYDROCHLOROTHIAZIDE 25 MG/1
TABLET ORAL
Qty: 90 TABLET | Refills: 1 | Status: SHIPPED | OUTPATIENT
Start: 2018-11-19 | End: 2019-04-02 | Stop reason: SDUPTHER

## 2018-11-19 RX ORDER — TRAZODONE HYDROCHLORIDE 100 MG/1
TABLET ORAL
Qty: 90 TABLET | Refills: 0 | Status: SHIPPED | OUTPATIENT
Start: 2018-11-19 | End: 2018-12-13 | Stop reason: SDUPTHER

## 2018-11-19 RX ORDER — FENOFIBRATE 160 MG/1
TABLET ORAL
Qty: 90 TABLET | Refills: 1 | Status: SHIPPED | OUTPATIENT
Start: 2018-11-19 | End: 2019-04-02 | Stop reason: SDUPTHER

## 2018-12-14 RX ORDER — NAPROXEN 500 MG/1
TABLET ORAL
Qty: 180 TABLET | Refills: 1 | Status: SHIPPED | OUTPATIENT
Start: 2018-12-14 | End: 2019-06-27 | Stop reason: SDUPTHER

## 2018-12-14 RX ORDER — PROPRANOLOL HYDROCHLORIDE 80 MG/1
CAPSULE, EXTENDED RELEASE ORAL
Qty: 90 CAPSULE | Refills: 1 | Status: SHIPPED | OUTPATIENT
Start: 2018-12-14 | End: 2019-06-27 | Stop reason: SDUPTHER

## 2018-12-14 RX ORDER — TRAZODONE HYDROCHLORIDE 100 MG/1
TABLET ORAL
Qty: 90 TABLET | Refills: 1 | Status: SHIPPED | OUTPATIENT
Start: 2018-12-14 | End: 2019-06-27 | Stop reason: SDUPTHER

## 2018-12-17 PROBLEM — N20.1 URETERAL STONE: Status: ACTIVE | Noted: 2018-12-16

## 2018-12-27 ENCOUNTER — OFFICE VISIT (OUTPATIENT)
Dept: INTERNAL MEDICINE CLINIC | Age: 53
End: 2018-12-27
Payer: COMMERCIAL

## 2018-12-27 VITALS
HEART RATE: 82 BPM | RESPIRATION RATE: 12 BRPM | BODY MASS INDEX: 40.68 KG/M2 | WEIGHT: 237 LBS | SYSTOLIC BLOOD PRESSURE: 124 MMHG | DIASTOLIC BLOOD PRESSURE: 80 MMHG

## 2018-12-27 DIAGNOSIS — E78.5 DYSLIPIDEMIA: ICD-10-CM

## 2018-12-27 DIAGNOSIS — N20.1 URETEROLITHIASIS: Primary | ICD-10-CM

## 2018-12-27 DIAGNOSIS — N20.0 BILATERAL KIDNEY STONES: ICD-10-CM

## 2018-12-27 DIAGNOSIS — N13.30 HYDRONEPHROSIS OF RIGHT KIDNEY: ICD-10-CM

## 2018-12-27 DIAGNOSIS — D64.9 NORMOCYTIC ANEMIA: ICD-10-CM

## 2018-12-27 DIAGNOSIS — K80.20 CALCULUS OF GALLBLADDER WITHOUT CHOLECYSTITIS WITHOUT OBSTRUCTION: ICD-10-CM

## 2018-12-27 DIAGNOSIS — I10 BENIGN ESSENTIAL HTN: ICD-10-CM

## 2018-12-27 DIAGNOSIS — N17.9 AKI (ACUTE KIDNEY INJURY) (HCC): ICD-10-CM

## 2018-12-27 PROCEDURE — 99214 OFFICE O/P EST MOD 30 MIN: CPT | Performed by: INTERNAL MEDICINE

## 2018-12-27 NOTE — PATIENT INSTRUCTIONS
increase the amount of fluids you drink. · Do not take more than the recommended daily dose of vitamins C and D.  · Limit the salt in your diet. · Eat a balanced diet that is not too high in protein. Follow-up care is a key part of your treatment and safety. Be sure to make and go to all appointments, and call your doctor if you are having problems. It's also a good idea to know your test results and keep a list of the medicines you take. Where can you learn more? Go to https://V[employer] TherapeuticspeConnectbrighteb.Connectv.com. org and sign in to your [employer] account. Enter C138 in the [employer] box to learn more about \"Learning About Diet for Kidney Stone Prevention. \"     If you do not have an account, please click on the \"Sign Up Now\" link. Current as of: March 29, 2018  Content Version: 11.8  © 4974-2324 Healthwise, Incorporated. Care instructions adapted under license by [facility] ([facility]). If you have questions about a medical condition or this instruction, always ask your healthcare professional. [address] any warranty or liability for your use of this information.

## 2018-12-27 NOTE — PROGRESS NOTES
fluticasone (FLONASE) 50 MCG/ACT nasal spray 2 sprays by Nasal route daily Yes Cheko Carpio DO   gabapentin (NEURONTIN) 100 MG capsule TAKE 1 CAPSULE TWICE DAILY Yes Cheko Carpio DO   SYMBICORT 160-4.5 MCG/ACT AERO INHALE 2 PUFFS INTO THE LUNGS 2 TIMES DAILY Yes Cheko Carpio DO   cloNIDine (CATAPRES) 0.2 MG tablet TAKE 1 TABLET EVERY DAY Yes Morelia Pastor MD   VENTOLIN  (90 Base) MCG/ACT inhaler INHALE 2 PUFFS INTO THE LUNGS EVERY 6 HOURS AS NEEDED FOR WHEEZING OR SHORTNESS OF BREATH (OR COUGHING) Yes Cheko Carpio DO   magnesium oxide (MAG-OX) 400 (241.3 MG) MG TABS tablet TAKE 1 TABLET BY MOUTH DAILY Yes Morleia Pastor MD   ferrous sulfate 325 (65 FE) MG tablet TAKE 1 TABLET EVERY DAY WITH BREAKFAST Yes Cheko Carpio DO   polyethylene glycol (MIRALAX) powder Take 17 g by mouth daily Yes Cheko Carpio DO   loratadine (CLARITIN) 10 MG tablet Take 1 tablet by mouth daily Yes Cheko Carpio DO   DULoxetine (CYMBALTA) 60 MG capsule Take 60 mg by mouth 2 times daily Yes Historical Provider, MD   Peginterferon Beta-1a (PLEGRIDY) 125 MCG/0.5ML SOPN Inject 125 mLs into the skin daily 1 shot every 2 weeks Yes Historical Provider, MD   modafinil (PROVIGIL) 100 MG tablet Take 200 mg by mouth daily  Yes Historical Provider, MD   baclofen (LIORESAL) 20 MG tablet TAKE 1 TABLET TWICE DAILY Yes Cheko Carpio DO   Cholecalciferol (D-2000 MAXIMUM STRENGTH) 2000 UNITS TABS TAKE ONE TABLET BY MOUTH ONCE DAILY Yes Cheko Carpio DO           Review of Systems - As per HPI      OBJECTIVE:  /80   Pulse 82   Resp 12   Wt 237 lb (107.5 kg)   BMI 40.68 kg/m²   GEN: NAD, A&O, Non-toxic, obese. HEENT: NC/AT, ADELINA, EOMI, anicteric, Oral cavity Clear,  TM's NL, Nasal cavity clear. NECK: Supple. No thyromegaly. No JVD  LYMPH: No C/SC nodes. CV: S1 S2 NL, RRR. No murmurs, clicks or rubs. PULM: CTA, symmentric air exchange  EXT: No edema  GI: Abdomen is soft, NT, BS+, No hepatomegaly.    NEURO: No focal or lateralizing deficits. VASC:  No carotid bruits. Pulses symmetric  MS: No CVA tenderness to percussion      ASSESSMENT/PLAN:    1. Ureterolithiasis  This is a new problem. Condition improved  Follow-up with urology  Patient will be pulling her stent this Sunday  Refer to literature for stone prevention. Push fluids    2. Hydronephrosis of right kidney  As above    3. KENAN (acute kidney injury) ([address]  Due for repeat lab. Her creatinine was not quite normal upon her discharge  Advised that she push fluids  - Comprehensive Metabolic Panel; Future    4. Bilateral kidney stones  As above    5. Calculus of gallbladder without cholecystitis without obstruction  Condition is stable and asymptomatic at this time  Continue to monitor for re-current biliary colic symptoms    6. Normocytic anemia  Repeat lab  - CBC Auto Differential; Future    7. Dyslipidemia  Condition control is uncertain. Due for lab  Continue fenofibrate for now. Consider statin therapy. - Lipid Panel; Future  - TSH without Reflex; Future    8. Benign essential HTN  Blood pressure is fairly well-controlled  Continue efforts with calorie restriction and weight loss efforts. Low-sodium diet. - CBC Auto Differential; Future  - Lipid Panel; Future  - TSH without Reflex; Future          Discussed medications with patient who voiced understanding of their use, indication and potential side effects. Pt also understands the above recommendations. All questions answered.

## 2019-02-07 RX ORDER — GABAPENTIN 100 MG/1
CAPSULE ORAL
Qty: 180 CAPSULE | Refills: 1 | Status: SHIPPED | OUTPATIENT
Start: 2019-02-07 | End: 2019-06-27 | Stop reason: SDUPTHER

## 2019-03-18 RX ORDER — ALBUTEROL SULFATE 90 UG/1
AEROSOL, METERED RESPIRATORY (INHALATION)
Qty: 3 INHALER | Refills: 3 | Status: SHIPPED | OUTPATIENT
Start: 2019-03-18 | End: 2019-12-07 | Stop reason: SDUPTHER

## 2019-04-02 RX ORDER — FENOFIBRATE 160 MG/1
TABLET ORAL
Qty: 90 TABLET | Refills: 1 | Status: SHIPPED | OUTPATIENT
Start: 2019-04-02 | End: 2020-04-28 | Stop reason: SDUPTHER

## 2019-04-02 RX ORDER — HYDROCHLOROTHIAZIDE 25 MG/1
TABLET ORAL
Qty: 90 TABLET | Refills: 1 | Status: ON HOLD | OUTPATIENT
Start: 2019-04-02 | End: 2020-03-04

## 2019-04-15 DIAGNOSIS — Z78.0 MENOPAUSE: ICD-10-CM

## 2019-04-15 RX ORDER — CLONIDINE HYDROCHLORIDE 0.2 MG/1
TABLET ORAL
Qty: 90 TABLET | Refills: 3 | Status: SHIPPED | OUTPATIENT
Start: 2019-04-15 | End: 2020-02-20

## 2019-04-15 RX ORDER — OMEPRAZOLE 40 MG/1
CAPSULE, DELAYED RELEASE ORAL
Qty: 180 CAPSULE | Refills: 1 | Status: SHIPPED | OUTPATIENT
Start: 2019-04-15 | End: 2019-09-17 | Stop reason: SDUPTHER

## 2019-05-14 ENCOUNTER — OFFICE VISIT (OUTPATIENT)
Dept: GYNECOLOGY | Age: 54
End: 2019-05-14
Payer: COMMERCIAL

## 2019-05-14 VITALS
BODY MASS INDEX: 40.97 KG/M2 | SYSTOLIC BLOOD PRESSURE: 124 MMHG | DIASTOLIC BLOOD PRESSURE: 76 MMHG | WEIGHT: 240 LBS | RESPIRATION RATE: 17 BRPM | HEIGHT: 64 IN

## 2019-05-14 DIAGNOSIS — Z87.42: ICD-10-CM

## 2019-05-14 DIAGNOSIS — N95.0 POST-MENOPAUSAL BLEEDING: ICD-10-CM

## 2019-05-14 DIAGNOSIS — Z01.419 WELL WOMAN EXAM WITH ROUTINE GYNECOLOGICAL EXAM: Primary | ICD-10-CM

## 2019-05-14 LAB
BILIRUBIN URINE: NEGATIVE
BLOOD, URINE: NEGATIVE
CLARITY: CLEAR
COLOR: NORMAL
EPITHELIAL CELLS, UA: 1 /HPF (ref 0–5)
GLUCOSE URINE: NEGATIVE MG/DL
HYALINE CASTS: 4 /LPF (ref 0–8)
KETONES, URINE: NEGATIVE MG/DL
LEUKOCYTE ESTERASE, URINE: NEGATIVE
MICROSCOPIC EXAMINATION: NORMAL
NITRITE, URINE: NEGATIVE
PH UA: 6 (ref 5–8)
PROTEIN UA: NEGATIVE MG/DL
RBC UA: 3 /HPF (ref 0–4)
SPECIFIC GRAVITY UA: >1.03 (ref 1–1.03)
UROBILINOGEN, URINE: 0.2 E.U./DL
WBC UA: 1 /HPF (ref 0–5)

## 2019-05-14 PROCEDURE — 81001 URINALYSIS AUTO W/SCOPE: CPT | Performed by: OBSTETRICS & GYNECOLOGY

## 2019-05-14 PROCEDURE — 99396 PREV VISIT EST AGE 40-64: CPT | Performed by: OBSTETRICS & GYNECOLOGY

## 2019-05-14 ASSESSMENT — ENCOUNTER SYMPTOMS
EYES NEGATIVE: 1
RESPIRATORY NEGATIVE: 1
GASTROINTESTINAL NEGATIVE: 1

## 2019-05-15 NOTE — PROGRESS NOTES
insufficiency & reflux of legs    Weakness of left arm     DUE TO MS     Past Surgical History:   Procedure Laterality Date    BLADDER SUSPENSION      BREAST SURGERY      rt breast lumpectomy-atypical    COLONOSCOPY  3/13/2013    Dr. Uche Haywood    partial     OTHER SURGICAL HISTORY Bilateral 2013    Incision and Drainage of abcess lower lip    OTHER SURGICAL HISTORY N/A 2016    OPEN REPAIR OF VENTRAL HERNIA             SALPINGO-OOPHORECTOMY  2013    SKIN BIOPSY       OB History    Para Term  AB Living   3 3 3     3   SAB TAB Ectopic Molar Multiple Live Births             3      # Outcome Date GA Lbr Shabbir/2nd Weight Sex Delivery Anes PTL Lv   3 Term 12 37w0d   M Vag-Spont   ЮЛИЯ   2 Term 26 37w0d   M Vag-Spont   ЮЛИЯ   1 Term 0 37w0d   F Vag-Spont   ЮЛИЯ     Social History     Socioeconomic History    Marital status:      Spouse name: Not on file    Number of children: Not on file    Years of education: Not on file    Highest education level: Not on file   Occupational History    Not on file   Social Needs    Financial resource strain: Not on file    Food insecurity:     Worry: Not on file     Inability: Not on file    Transportation needs:     Medical: Not on file     Non-medical: Not on file   Tobacco Use    Smoking status: Former Smoker     Packs/day: 0.25     Years: 5.00     Pack years: 1.25     Types: Cigarettes     Last attempt to quit: 1982     Years since quittin.8    Smokeless tobacco: Former User   Substance and Sexual Activity    Alcohol use: No    Drug use: No    Sexual activity: Yes     Partners: Male   Lifestyle    Physical activity:     Days per week: Not on file     Minutes per session: Not on file    Stress: Not on file   Relationships    Social connections:     Talks on phone: Not on file     Gets together: Not on file     Attends Scientologist service: Not on file     Active member of club or organization: Not on file     Attends meetings of clubs or organizations: Not on file     Relationship status: Not on file    Intimate partner violence:     Fear of current or ex partner: Not on file     Emotionally abused: Not on file     Physically abused: Not on file     Forced sexual activity: Not on file   Other Topics Concern    Not on file   Social History Narrative    Not on file     No Known Allergies  Outpatient Medications Marked as Taking for the 5/14/19 encounter (Office Visit) with Lucy Page MD   Medication Sig Dispense Refill    cloNIDine (CATAPRES) 0.2 MG tablet TAKE 1 TABLET EVERY DAY 90 tablet 3    omeprazole (PRILOSEC) 40 MG delayed release capsule TAKE 1 CAPSULE TWICE DAILY (NEED MD APPOINTMENT) 180 capsule 1    fenofibrate 160 MG tablet TAKE 1 TABLET EVERY DAY 90 tablet 1    hydrochlorothiazide (HYDRODIURIL) 25 MG tablet TAKE 1 TABLET EVERY DAY 90 tablet 1    albuterol sulfate  (90 Base) MCG/ACT inhaler INHALE 2 PUFFS INTO THE LUNGS EVERY 6 HOURS AS NEEDED FOR WHEEZING OR SHORTNESS OF BREATH (OR COUGHING) 3 Inhaler 3    gabapentin (NEURONTIN) 100 MG capsule TAKE 1 CAPSULE TWICE DAILY 180 capsule 1    naproxen (NAPROSYN) 500 MG tablet TAKE 1 TABLET TWICE DAILY WITH FOOD 180 tablet 1    propranolol (INDERAL LA) 80 MG extended release capsule TAKE 1 CAPSULE EVERY DAY 90 capsule 1    traZODone (DESYREL) 100 MG tablet TAKE 1 TABLET EVERY NIGHT (GENERIC FOR DESYREL) 90 tablet 1    fluticasone (FLONASE) 50 MCG/ACT nasal spray 2 sprays by Nasal route daily 1 Bottle 2    SYMBICORT 160-4.5 MCG/ACT AERO INHALE 2 PUFFS INTO THE LUNGS 2 TIMES DAILY 3 Inhaler 3    magnesium oxide (MAG-OX) 400 (241.3 MG) MG TABS tablet TAKE 1 TABLET BY MOUTH DAILY 90 tablet 6    ferrous sulfate 325 (65 FE) MG tablet TAKE 1 TABLET EVERY DAY WITH BREAKFAST 90 tablet 1    polyethylene glycol (MIRALAX) powder Take 17 g by mouth daily 510 g 0    loratadine no distension and no mass. There is no hepatomegaly. There is no tenderness. There is no rebound and no guarding. No hernia. Hernia confirmed negative in the right inguinal area and confirmed negative in the left inguinal area. Genitourinary: Vagina normal. Rectal exam shows no external hemorrhoid, no internal hemorrhoid, no fissure, no mass, no tenderness, anal tone normal and guaiac negative stool. No breast tenderness, discharge or bleeding. No labial fusion. There is no rash, tenderness, lesion or injury on the right labia. There is no rash, tenderness, lesion or injury on the left labia. Right adnexum displays no mass, no tenderness and no fullness. Left adnexum displays no mass, no tenderness and no fullness. No erythema, tenderness or bleeding in the vagina. No foreign body in the vagina. No signs of injury around the vagina. No vaginal discharge found. Genitourinary Comments: Normal urethral meatus, nl urethra, nl bladder. Musculoskeletal: Normal range of motion. She exhibits no edema or tenderness. Lymphadenopathy:     She has no cervical adenopathy. Right: No inguinal adenopathy present. Left: No inguinal adenopathy present. Neurological: She is alert and oriented to person, place, and time. She has normal reflexes. Skin: Skin is warm and dry. She is not diaphoretic. Psychiatric: She has a normal mood and affect. Her behavior is normal. Thought content normal.       Assessment:      1. Annual  2. Menopause  3. Vaginal spotting      Plan:      1. Pap, calcium, exercise, mammogram,   2. See below  3. Does have some vaginal atrophy-cannot do estrogen. Discussed replens daily.  Dax Mcpherson MD

## 2019-05-16 LAB
HPV COMMENT: NORMAL
HPV TYPE 16: NOT DETECTED
HPV TYPE 18: NOT DETECTED
HPVOH (OTHER TYPES): NOT DETECTED
ORGANISM: ABNORMAL
URINE CULTURE, ROUTINE: ABNORMAL
URINE CULTURE, ROUTINE: ABNORMAL

## 2019-05-30 ENCOUNTER — HOSPITAL ENCOUNTER (OUTPATIENT)
Dept: MAMMOGRAPHY | Age: 54
Discharge: HOME | End: 2019-05-30
Payer: COMMERCIAL

## 2019-05-30 ENCOUNTER — OFFICE VISIT (OUTPATIENT)
Dept: SURGERY | Age: 54
End: 2019-05-30
Payer: COMMERCIAL

## 2019-05-30 VITALS
SYSTOLIC BLOOD PRESSURE: 132 MMHG | TEMPERATURE: 98.6 F | BODY MASS INDEX: 40.8 KG/M2 | HEIGHT: 64 IN | WEIGHT: 239 LBS | DIASTOLIC BLOOD PRESSURE: 79 MMHG

## 2019-05-30 DIAGNOSIS — N60.19 FIBROCYSTIC BREAST, UNSPECIFIED LATERALITY: Primary | ICD-10-CM

## 2019-05-30 DIAGNOSIS — Z87.42: ICD-10-CM

## 2019-05-30 DIAGNOSIS — Z12.39 BREAST CANCER SCREENING, HIGH RISK PATIENT: ICD-10-CM

## 2019-05-30 PROCEDURE — 77063 BREAST TOMOSYNTHESIS BI: CPT

## 2019-05-30 PROCEDURE — 99214 OFFICE O/P EST MOD 30 MIN: CPT | Performed by: SURGERY

## 2019-05-30 ASSESSMENT — ENCOUNTER SYMPTOMS
ABDOMINAL DISTENTION: 0
CHEST TIGHTNESS: 0
ABDOMINAL PAIN: 0
SHORTNESS OF BREATH: 0
COLOR CHANGE: 0

## 2019-05-30 NOTE — PROGRESS NOTES
lumbar     Depression 2014    DVT, lower extremity ([address] 1979    Due to birth control pills    Fibrocystic breast     GERD (gastroesophageal reflux disease)     Hormone disorder     Hx MRSA infection     pt states she has had several episodes of mrsa infections recently on stomach, which was tx'ed w/ antibiotics and is now (2013) scabbed over      Hyperlipidemia     Hypertension     Insomnia     interruption insomnia    Migraine     MS (multiple sclerosis) ([address] 2010    Multinodular goiter     Nausea & vomiting     Osteoarthritis     hips and shoulders    Polyp of colon, adenomatous 2017    PONV (postoperative nausea and vomiting)     severe    Rash     Right leg DVT ([address]     10years of age   Arvil Fitch Sleep apnea     CPAP set at 15, 11    Spondylolisthesis of lumbar region     Thyroid nodule 2013    Ureteral stone 2018    with right hydronephrosis    Ureterolithiasis 2018    with right hydronephrosis    Urinary incontinence     Venous insufficiency 13    insufficiency & reflux of legs    Weakness of left arm     DUE TO MS       Past Surgical History:   Procedure Laterality Date   [id number] [facility] BREAST SURGERY      rt breast lumpectomy-atypical    COLONOSCOPY  3/13/2013    Dr. Nila Moreira    partial     OTHER SURGICAL HISTORY Bilateral 2013    Incision and Drainage of abcess lower lip    OTHER SURGICAL HISTORY N/A 2016    OPEN REPAIR OF VENTRAL HERNIA             SALPINGO-OOPHORECTOMY  2013    SKIN BIOPSY       Allergies as of 2019    (No Known Allergies)     Social History     Tobacco Use    Smoking status: Former Smoker     Packs/day: 0.25     Years: 5.00     Pack years: 1.25     Types: Cigarettes     Last attempt to quit: 1982     Years since quittin.8    Smokeless tobacco: Former User   Substance Use Topics    Alcohol use: No    Drug use: No     Review of Systems   Constitutional: Negative for chills, fever and unexpected weight change. Respiratory: Negative for chest tightness and shortness of breath. Cardiovascular: Negative for chest pain and leg swelling. Gastrointestinal: Negative for abdominal distention and abdominal pain. Genitourinary: Negative for difficulty urinating and dysuria. Skin: Negative for color change and rash. Allergic/Immunologic: Negative for environmental allergies and food allergies. Hematological: Negative for adenopathy. Does not bruise/bleed easily. Psychiatric/Behavioral: Negative for dysphoric mood. The patient is not nervous/anxious.         Current Outpatient Medications on File Prior to Visit   Medication Sig Dispense Refill    cloNIDine (CATAPRES) 0.2 MG tablet TAKE 1 TABLET EVERY DAY 90 tablet 3    omeprazole (PRILOSEC) 40 MG delayed release capsule TAKE 1 CAPSULE TWICE DAILY (NEED MD APPOINTMENT) 180 capsule 1    fenofibrate 160 MG tablet TAKE 1 TABLET EVERY DAY 90 tablet 1    hydrochlorothiazide (HYDRODIURIL) 25 MG tablet TAKE 1 TABLET EVERY DAY 90 tablet 1    albuterol sulfate  (90 Base) MCG/ACT inhaler INHALE 2 PUFFS INTO THE LUNGS EVERY 6 HOURS AS NEEDED FOR WHEEZING OR SHORTNESS OF BREATH (OR COUGHING) 3 Inhaler 3    gabapentin (NEURONTIN) 100 MG capsule TAKE 1 CAPSULE TWICE DAILY 180 capsule 1    naproxen (NAPROSYN) 500 MG tablet TAKE 1 TABLET TWICE DAILY WITH FOOD 180 tablet 1    propranolol (INDERAL LA) 80 MG extended release capsule TAKE 1 CAPSULE EVERY DAY 90 capsule 1    traZODone (DESYREL) 100 MG tablet TAKE 1 TABLET EVERY NIGHT (GENERIC FOR DESYREL) 90 tablet 1    fluticasone (FLONASE) 50 MCG/ACT nasal spray 2 sprays by Nasal route daily 1 Bottle 2    SYMBICORT 160-4.5 MCG/ACT AERO INHALE 2 PUFFS INTO THE LUNGS 2 TIMES DAILY 3 Inhaler 3    magnesium oxide (MAG-OX) 400 (241.3 MG) MG TABS tablet TAKE 1 TABLET BY MOUTH DAILY 90 tablet 6    ferrous sulfate 325 (65 FE) MG tablet TAKE 1 TABLET EVERY DAY WITH BREAKFAST 90 tablet 1    polyethylene glycol (MIRALAX) powder Take 17 g by mouth daily 510 g 0    loratadine (CLARITIN) 10 MG tablet Take 1 tablet by mouth daily 90 tablet 3    DULoxetine (CYMBALTA) 60 MG capsule Take 60 mg by mouth 2 times daily      Peginterferon Beta-1a (PLEGRIDY) 125 MCG/0.5ML SOPN Inject 125 mLs into the skin daily 1 shot every 2 weeks      modafinil (PROVIGIL) 100 MG tablet Take 200 mg by mouth daily       baclofen (LIORESAL) 20 MG tablet TAKE 1 TABLET TWICE DAILY 180 tablet 1    Cholecalciferol (D-2000 MAXIMUM STRENGTH) 2000 UNITS TABS TAKE ONE TABLET BY MOUTH ONCE DAILY 90 tablet 1     No current facility-administered medications on file prior to visit. Exam:  /79   Temp 98.6 °F (37 °C) (Oral)   Ht 5' 4\" (1.626 m)   Wt 239 lb (108.4 kg)   BMI 41.02 kg/m²   Physical Exam  Constitutional: She appears well-nourished. No apparent distress. Head: Normocephalic and atraumatic. Eyes: EOM are normal. Pupils are equal, round, and reactive to light. Neck: Neck supple. No tracheal deviation present. Cardiovascular: Regular rate and rhythm. Pulmonary: Respirations are non-labored no wheezing. Lymphatics: No palpable cervical, supraclavicular, or axillary lymphadenopathy. Breast:  Right: No masses, nipple discharge, or overlying skin changes. Left: No masses, nipple discharge, or overlying skin changes. There is no axillary lymphadenopathy palpated bilaterally. Skin: No rash noted. Extremities: Well perfused, no edema. Neurologic: Alert and oriented. Assessment and Plan:  Based on Tyrer-Cuzik 8.0 risk model, her lifetime risk of breast cancer is 36.4%. Will plan to alternate MRI/mammogram, q 6 month visits with me/Amisha Snyder. No genetic counseling indicated. She is not interested in risk reducing breast surgery, although she has reduced her risk with premenopausal oophorectomy.     We discussed aromatase inhibitor and lifestyle changes to lower risk. She will read about AI, and we will revisit at next appointment. MRI ordered today. Return in about 6 months (around 11/30/2019) for Repeat examination. All of the patient's questions were answered at this time. Approximately 30 minutes of time were spent in this visit of which 50% or more of the time was related to coordination of care.

## 2019-05-31 ENCOUNTER — OFFICE VISIT (OUTPATIENT)
Dept: INTERNAL MEDICINE CLINIC | Age: 54
End: 2019-05-31
Payer: COMMERCIAL

## 2019-05-31 VITALS
RESPIRATION RATE: 12 BRPM | HEART RATE: 102 BPM | HEIGHT: 64 IN | BODY MASS INDEX: 40.8 KG/M2 | WEIGHT: 239 LBS | DIASTOLIC BLOOD PRESSURE: 72 MMHG | SYSTOLIC BLOOD PRESSURE: 115 MMHG

## 2019-05-31 DIAGNOSIS — K21.9 GASTROESOPHAGEAL REFLUX DISEASE WITHOUT ESOPHAGITIS: ICD-10-CM

## 2019-05-31 DIAGNOSIS — J45.20 MILD INTERMITTENT ASTHMA WITHOUT COMPLICATION: ICD-10-CM

## 2019-05-31 DIAGNOSIS — R82.79 POSITIVE URINE CULTURE: ICD-10-CM

## 2019-05-31 DIAGNOSIS — E78.5 DYSLIPIDEMIA: ICD-10-CM

## 2019-05-31 DIAGNOSIS — K80.10 CALCULUS OF GALLBLADDER WITH CHOLECYSTITIS WITHOUT BILIARY OBSTRUCTION, UNSPECIFIED CHOLECYSTITIS ACUITY: ICD-10-CM

## 2019-05-31 DIAGNOSIS — I10 BENIGN ESSENTIAL HTN: Primary | ICD-10-CM

## 2019-05-31 DIAGNOSIS — E04.2 MULTIPLE THYROID NODULES: ICD-10-CM

## 2019-05-31 DIAGNOSIS — M79.7 FIBROMYALGIA: ICD-10-CM

## 2019-05-31 DIAGNOSIS — F33.1 MAJOR DEPRESSIVE DISORDER, RECURRENT EPISODE, MODERATE (HCC): ICD-10-CM

## 2019-05-31 PROCEDURE — 99214 OFFICE O/P EST MOD 30 MIN: CPT | Performed by: INTERNAL MEDICINE

## 2019-05-31 NOTE — PROGRESS NOTES
[facility]) Physicians  Internal Medicine  Patient Encounter  Aubrey Bryan D.O., Domonique Lynn        Chief Complaint   Patient presents with   Dayanna Strange    Medication Check       HPI: 52 y.o. female seen today requesting a checkup regarding her chronic medical problems listed below along with a medication review. All of her health maintenance items are up-to-date. Also, she has additional complaints of a recent positive urine culture-- Beta Hemolytic strep. She was placed on Amoxicillin by her gynecologist.  The beta-hemolytic strep was less than 10,000 colony forming units. She wants this retested. HTN-- Patient's medical regimen remains the same. She denies any adverse side effects. She also denies any symptoms suggestive of accelerated hypertension. She denies any lightheadedness, headaches, dizziness or any other symptoms related to TIA or stroke. She states she does not add salt to foods. Asthma-- Patient denies any new problems with regards to her asthma. She denies any cough, shortness of breath, wheezing. She denies any nocturnal awakenings or impairment of activity. She is on Symbicort and tolerating this well. Depression/Anxiety/Adjustment disorder/Fibromyalgia-- She continues on Cymbalta and continues to have benefit from the medication particularly with her fibromyalgia pain. This was a dramatic improvement when the medication was started. DEVIN-- She is seeing Dr. Alyson Aj. She states she is wearing CPAP. She has not been seen in a while. Hyperlipidemia--   Lab Results   Component Value Date    LDLCALC 92 12/27/2018   Patient has a history of mixed dyslipidemia with primarily a triglyceride defect. She is on fenofibrate and tolerates the medication well. GERD-- Patient remains on omeprazole 40 mg twice a day. Downgrading therapy to H2 blockers was unsuccessful. She denies any current heartburn or dysphagia. She does have gall stones.   She no longer has problems with pain. She may get nauseated after eating. She states she is not ready to have surgery. Lab Results   Component Value Date    MG 1.90 10/25/2017      MS-- She sees Dr. Margie Frenandes at [address] [place] [address] [id number]. She will see her NP next week. She is on Plegridy, Peginterferon Beta every 2 weeks. She feels she is doing well.       Past Medical History:   Diagnosis Date    Adenomatous colon polyp 11/2017    Dr. Ny Kaminski 1/14/2014    Asthma     seasonal asthma    Bilateral kidney stones 12/27/2018    Cellulitis, lip     mrsa    Cervical spinal stenosis     Cholelithiasis 06/2018    Clotting disorder (HCC)     blood clot in leg as teenager bcp    Crohn's disease ([address]     DDD (degenerative disc disease), cervical     DDD (degenerative disc disease), lumbar     Depression 1/14/2014    DVT, lower extremity ([address] 1979    Due to birth control pills    Fibrocystic breast     GERD (gastroesophageal reflux disease)     Hormone disorder     Hx MRSA infection     pt states she has had several episodes of mrsa infections recently on stomach, which was tx'ed w/ antibiotics and is now (4/2013) scabbed over      Hyperlipidemia     Hypertension     Insomnia     interruption insomnia    Migraine     MS (multiple sclerosis) ([address] 6/2010    Multinodular goiter     Nausea & vomiting     Osteoarthritis     hips and shoulders    Polyp of colon, adenomatous 11/22/2017    PONV (postoperative nausea and vomiting)     severe    Rash     Right leg DVT ([address]     10years of age   [employer] Sleep apnea     CPAP set at 15, 11    Spondylolisthesis of lumbar region     Thyroid nodule 6/13/2013    Ureteral stone 12/16/2018    with right hydronephrosis    Ureterolithiasis 12/16/2018    with right hydronephrosis    Urinary incontinence     Venous insufficiency 03/08/13    insufficiency & reflux of legs    Weakness of left arm     DUE TO MS       Review of Systems -   As per HPI      OBJECTIVE:  /72   Pulse 102 Resp 12   Ht 5' 4\" (1.626 m)   Wt 239 lb (108.4 kg)   BMI 41.02 kg/m²     Wt Readings from Last 3 Encounters:   05/31/19 239 lb (108.4 kg)   05/30/19 239 lb (108.4 kg)   05/14/19 240 lb (108.9 kg)       GEN: NAD, A&O, Non-toxic  HEENT: NC/AT, ADELINA, EOMI, Oral cavity Clear,  TM's NL. Tongue midline. Oral mucosa without lesions   NECK: Supple. No thyromegaly or nodules. No soft tissue masses. No JVD. Trachea midline. LYMPH: No C/SC nodes. CV: Regular rhythm. Rate normal.  No murmurs. No ectopy. PULM: CTA, no wheezing. No rales  EXT: No Edema  GI: Abdomen is soft, non-distended. No reproducible tenderness. The abdomen is obese. No obvious masses noted. VASC:  No carotid bruits. Pedal Pulses symmetric  MS: Symmetric tender points throughout consistent with fibromyalgia. SKIN:  No rashes of concern  NEURO: no focal or lateralizing deficits. ASSESSMENT/PLAN:    1. Benign essential HTN  Blood pressure is well controlled  Continue current medications  Advised patient to stay well hydrated particularly during these hot summer months to avoid hypotension  - Comprehensive Metabolic Panel; Future  - Lipid Panel; Future    2. Positive urine culture    - Urine Culture    3. Dyslipidemia  Condition stability and control her uncertain at this time. Due for lab  Continue fenofibrate for now. No need for statin therapy at this time  Lifestyle approach. Patient counseled on diet and exercise modification  - Comprehensive Metabolic Panel; Future  - Lipid Panel; Future    The 10-year ASCVD risk score (Garrett Hill., et al., 2013) is: 2.9%    Values used to calculate the score:      Age: 52 years      Sex: Female      Is Non- : No      Diabetic: No      Tobacco smoker: No      Systolic Blood Pressure: 434 mmHg      Is BP treated: Yes      HDL Cholesterol: 32 mg/dL      Total Cholesterol: 161 mg/dL     4.  Gastroesophageal reflux disease without esophagitis  Condition is well controlled on the omeprazole  Continue to monitor magnesium and renal function  - Magnesium; Future    5. Calculus of gallbladder with cholecystitis without biliary obstruction, unspecified cholecystitis acuity  Condition is asymptomatic at this time. Continue to monitor for GI symptoms    6. Mild intermittent asthma without complication  Condition is well controlled  Continue controller therapy with Symbicort    7. Major depressive disorder, recurrent episode, moderate (HCC)  Condition is well controlled  Continue Cymbalta  Continue trazodone for sleep. This may also be helping with her mood    8. Multiple thyroid nodules  Repeat thyroid ultrasound. TSH in December 9.  Fibromyalgia  Condition is much improved on Cymbalta  Continue therapy  Continue exercise  Continue sleep hygiene

## 2019-05-31 NOTE — PATIENT INSTRUCTIONS
Follow up with sleep specialist-- Dr. Jahaira Torres    Thyroid ultrasound    Thyroid blood test in December    Continue Lifestyle modification including low calorie diet focusing on Low fat/low cholesterol and low carbohydrate intake, along with  increasing cardiovascular (aerobic) exercise.

## 2019-06-02 LAB
ORGANISM: ABNORMAL
URINE CULTURE, ROUTINE: ABNORMAL
URINE CULTURE, ROUTINE: ABNORMAL

## 2019-06-03 DIAGNOSIS — K21.9 GASTROESOPHAGEAL REFLUX DISEASE WITHOUT ESOPHAGITIS: ICD-10-CM

## 2019-06-03 DIAGNOSIS — I10 BENIGN ESSENTIAL HTN: ICD-10-CM

## 2019-06-03 DIAGNOSIS — E78.5 DYSLIPIDEMIA: ICD-10-CM

## 2019-06-03 LAB
A/G RATIO: 1.2 (ref 1.1–2.2)
ALBUMIN SERPL-MCNC: 4.1 G/DL (ref 3.4–5)
ALP BLD-CCNC: 75 U/L (ref 40–129)
ALT SERPL-CCNC: 59 U/L (ref 10–40)
ANION GAP SERPL CALCULATED.3IONS-SCNC: 10 MMOL/L (ref 3–16)
AST SERPL-CCNC: 57 U/L (ref 15–37)
BILIRUB SERPL-MCNC: 0.3 MG/DL (ref 0–1)
BUN BLDV-MCNC: 13 MG/DL (ref 7–20)
CALCIUM SERPL-MCNC: 9.5 MG/DL (ref 8.3–10.6)
CHLORIDE BLD-SCNC: 101 MMOL/L (ref 99–110)
CHOLESTEROL, TOTAL: 157 MG/DL (ref 0–199)
CO2: 27 MMOL/L (ref 21–32)
CREAT SERPL-MCNC: 0.8 MG/DL (ref 0.6–1.1)
GFR AFRICAN AMERICAN: >60
GFR NON-AFRICAN AMERICAN: >60
GLOBULIN: 3.5 G/DL
GLUCOSE BLD-MCNC: 91 MG/DL (ref 70–99)
HDLC SERPL-MCNC: 36 MG/DL (ref 40–60)
LDL CHOLESTEROL CALCULATED: 90 MG/DL
MAGNESIUM: 1.6 MG/DL (ref 1.8–2.4)
POTASSIUM SERPL-SCNC: 4.4 MMOL/L (ref 3.5–5.1)
SODIUM BLD-SCNC: 138 MMOL/L (ref 136–145)
TOTAL PROTEIN: 7.6 G/DL (ref 6.4–8.2)
TRIGL SERPL-MCNC: 153 MG/DL (ref 0–150)
VLDLC SERPL CALC-MCNC: 31 MG/DL

## 2019-06-05 ENCOUNTER — PATIENT MESSAGE (OUTPATIENT)
Dept: GYNECOLOGY | Age: 54
End: 2019-06-05

## 2019-06-06 RX ORDER — AMOXICILLIN 500 MG/1
500 CAPSULE ORAL 3 TIMES DAILY
Qty: 30 CAPSULE | Refills: 0 | Status: SHIPPED | OUTPATIENT
Start: 2019-06-06 | End: 2019-06-16

## 2019-06-28 RX ORDER — GABAPENTIN 100 MG/1
CAPSULE ORAL
Qty: 180 CAPSULE | Refills: 1 | Status: ON HOLD | OUTPATIENT
Start: 2019-06-28 | End: 2020-03-04

## 2019-06-28 RX ORDER — NAPROXEN 500 MG/1
TABLET ORAL
Qty: 180 TABLET | Refills: 1 | Status: SHIPPED | OUTPATIENT
Start: 2019-06-28 | End: 2019-11-28 | Stop reason: SDUPTHER

## 2019-06-28 RX ORDER — TRAZODONE HYDROCHLORIDE 100 MG/1
TABLET ORAL
Qty: 90 TABLET | Refills: 1 | Status: SHIPPED | OUTPATIENT
Start: 2019-06-28 | End: 2019-11-28 | Stop reason: SDUPTHER

## 2019-06-28 RX ORDER — BUDESONIDE AND FORMOTEROL FUMARATE DIHYDRATE 160; 4.5 UG/1; UG/1
AEROSOL RESPIRATORY (INHALATION)
Qty: 3 INHALER | Refills: 3 | Status: SHIPPED | OUTPATIENT
Start: 2019-06-28 | End: 2020-05-04

## 2019-06-28 RX ORDER — PROPRANOLOL HYDROCHLORIDE 80 MG/1
CAPSULE, EXTENDED RELEASE ORAL
Qty: 90 CAPSULE | Refills: 1 | Status: SHIPPED | OUTPATIENT
Start: 2019-06-28 | End: 2019-11-28 | Stop reason: SDUPTHER

## 2019-07-31 ENCOUNTER — OFFICE VISIT (OUTPATIENT)
Dept: INTERNAL MEDICINE CLINIC | Age: 54
End: 2019-07-31
Payer: COMMERCIAL

## 2019-07-31 VITALS
TEMPERATURE: 98.3 F | SYSTOLIC BLOOD PRESSURE: 110 MMHG | DIASTOLIC BLOOD PRESSURE: 64 MMHG | RESPIRATION RATE: 12 BRPM | BODY MASS INDEX: 41.2 KG/M2 | WEIGHT: 240 LBS

## 2019-07-31 DIAGNOSIS — R30.0 DYSURIA: Primary | ICD-10-CM

## 2019-07-31 DIAGNOSIS — N30.00 ACUTE CYSTITIS WITHOUT HEMATURIA: ICD-10-CM

## 2019-07-31 LAB
BILIRUBIN, POC: NORMAL
BLOOD URINE, POC: NORMAL
CLARITY, POC: NORMAL
COLOR, POC: NORMAL
GLUCOSE URINE, POC: NORMAL
KETONES, POC: NORMAL
LEUKOCYTE EST, POC: NORMAL
NITRITE, POC: NORMAL
PH, POC: 5
PROTEIN, POC: NORMAL
SPECIFIC GRAVITY, POC: 1.01
UROBILINOGEN, POC: 0.2

## 2019-07-31 PROCEDURE — 99213 OFFICE O/P EST LOW 20 MIN: CPT | Performed by: INTERNAL MEDICINE

## 2019-07-31 PROCEDURE — 81002 URINALYSIS NONAUTO W/O SCOPE: CPT | Performed by: INTERNAL MEDICINE

## 2019-07-31 RX ORDER — SULFAMETHOXAZOLE AND TRIMETHOPRIM 800; 160 MG/1; MG/1
1 TABLET ORAL 2 TIMES DAILY
Qty: 6 TABLET | Refills: 0 | Status: SHIPPED | OUTPATIENT
Start: 2019-07-31 | End: 2019-08-03

## 2019-07-31 NOTE — PROGRESS NOTES
magnesium oxide (MAG-OX) 400 (241.3 MG) MG TABS tablet TAKE 1 TABLET BY MOUTH DAILY 90 tablet 6    ferrous sulfate 325 (65 FE) MG tablet TAKE 1 TABLET EVERY DAY WITH BREAKFAST 90 tablet 1    polyethylene glycol (MIRALAX) powder Take 17 g by mouth daily 510 g 0    loratadine (CLARITIN) 10 MG tablet Take 1 tablet by mouth daily 90 tablet 3    DULoxetine (CYMBALTA) 60 MG capsule Take 60 mg by mouth 2 times daily      Peginterferon Beta-1a (PLEGRIDY) 125 MCG/0.5ML SOPN Inject 125 mLs into the skin daily 1 shot every 2 weeks      modafinil (PROVIGIL) 100 MG tablet Take 200 mg by mouth daily       baclofen (LIORESAL) 20 MG tablet TAKE 1 TABLET TWICE DAILY 180 tablet 1    Cholecalciferol (D-2000 MAXIMUM STRENGTH) 2000 UNITS TABS TAKE ONE TABLET BY MOUTH ONCE DAILY 90 tablet 1       OBJECTIVE:   Vitals:    07/31/19 1204   BP: 110/64   Resp: 12   Temp: 98.3 °F (36.8 °C)     Physical Exam   Constitutional: No distress. Abdominal: There is tenderness (mild suprapubic discomfort). There is no CVA tenderness. Musculoskeletal:        Lumbar back: She exhibits no tenderness and no bony tenderness. Neurological: She is alert.        Results for POC orders placed in visit on 07/31/19   POCT Urinalysis no Micro   Result Value Ref Range    Color, UA brown     Clarity, UA slighty cloudy     Glucose, UA POC neg     Bilirubin, UA small     Ketones, UA trace     Spec Grav, UA 1.010     Blood, UA POC neg     pH, UA 5.0     Protein, UA POC neg     Urobilinogen, UA 0.2     Leukocytes, UA trace     Nitrite, UA neg         Joey Santana MD

## 2019-08-03 LAB — URINE CULTURE, ROUTINE: NORMAL

## 2019-09-17 RX ORDER — OMEPRAZOLE 40 MG/1
CAPSULE, DELAYED RELEASE ORAL
Qty: 180 CAPSULE | Refills: 1 | Status: SHIPPED | OUTPATIENT
Start: 2019-09-17 | End: 2020-02-10

## 2019-10-21 ENCOUNTER — OFFICE VISIT (OUTPATIENT)
Dept: INTERNAL MEDICINE CLINIC | Age: 54
End: 2019-10-21
Payer: COMMERCIAL

## 2019-10-21 VITALS — BODY MASS INDEX: 40.68 KG/M2 | SYSTOLIC BLOOD PRESSURE: 120 MMHG | DIASTOLIC BLOOD PRESSURE: 64 MMHG | WEIGHT: 237 LBS

## 2019-10-21 DIAGNOSIS — M76.31 IT BAND SYNDROME, RIGHT: ICD-10-CM

## 2019-10-21 DIAGNOSIS — M70.61 TROCHANTERIC BURSITIS OF RIGHT HIP: Primary | ICD-10-CM

## 2019-10-21 PROCEDURE — 99213 OFFICE O/P EST LOW 20 MIN: CPT | Performed by: INTERNAL MEDICINE

## 2019-10-21 RX ORDER — PREDNISONE 10 MG/1
TABLET ORAL
Qty: 35 TABLET | Refills: 0 | Status: SHIPPED | OUTPATIENT
Start: 2019-10-21 | End: 2019-10-31

## 2019-11-29 RX ORDER — NAPROXEN 500 MG/1
TABLET ORAL
Qty: 180 TABLET | Refills: 0 | Status: SHIPPED | OUTPATIENT
Start: 2019-11-29 | End: 2020-02-10

## 2019-11-29 RX ORDER — PROPRANOLOL HYDROCHLORIDE 80 MG/1
CAPSULE, EXTENDED RELEASE ORAL
Qty: 90 CAPSULE | Refills: 0 | Status: SHIPPED | OUTPATIENT
Start: 2019-11-29 | End: 2020-02-10

## 2019-11-29 RX ORDER — TRAZODONE HYDROCHLORIDE 100 MG/1
TABLET ORAL
Qty: 90 TABLET | Refills: 0 | Status: SHIPPED | OUTPATIENT
Start: 2019-11-29 | End: 2020-02-10

## 2019-12-09 RX ORDER — ALBUTEROL SULFATE 90 UG/1
AEROSOL, METERED RESPIRATORY (INHALATION)
Qty: 54 G | Refills: 0 | Status: SHIPPED | OUTPATIENT
Start: 2019-12-09 | End: 2020-02-10

## 2020-02-10 RX ORDER — NAPROXEN 500 MG/1
TABLET ORAL
Qty: 180 TABLET | Refills: 0 | Status: SHIPPED | OUTPATIENT
Start: 2020-02-10 | End: 2020-02-24

## 2020-02-10 RX ORDER — ALBUTEROL SULFATE 90 UG/1
AEROSOL, METERED RESPIRATORY (INHALATION)
Qty: 54 G | Refills: 0 | Status: SHIPPED | OUTPATIENT
Start: 2020-02-10 | End: 2020-04-28 | Stop reason: SDUPTHER

## 2020-02-10 RX ORDER — TRAZODONE HYDROCHLORIDE 100 MG/1
TABLET ORAL
Qty: 90 TABLET | Refills: 0 | Status: SHIPPED | OUTPATIENT
Start: 2020-02-10 | End: 2020-04-27

## 2020-02-10 RX ORDER — OMEPRAZOLE 40 MG/1
CAPSULE, DELAYED RELEASE ORAL
Qty: 180 CAPSULE | Refills: 1 | Status: SHIPPED | OUTPATIENT
Start: 2020-02-10 | End: 2020-12-17 | Stop reason: SDUPTHER

## 2020-02-10 RX ORDER — PROPRANOLOL HYDROCHLORIDE 80 MG/1
CAPSULE, EXTENDED RELEASE ORAL
Qty: 90 CAPSULE | Refills: 0 | Status: SHIPPED | OUTPATIENT
Start: 2020-02-10 | End: 2020-04-27

## 2020-02-13 ENCOUNTER — TELEPHONE (OUTPATIENT)
Dept: INTERNAL MEDICINE CLINIC | Age: 55
End: 2020-02-13

## 2020-02-24 ENCOUNTER — HOSPITAL ENCOUNTER (EMERGENCY)
Age: 55
Discharge: HOME | DRG: 871 | End: 2020-02-24
Attending: EMERGENCY MEDICINE
Payer: COMMERCIAL

## 2020-02-24 VITALS
DIASTOLIC BLOOD PRESSURE: 71 MMHG | OXYGEN SATURATION: 94 % | RESPIRATION RATE: 16 BRPM | HEIGHT: 64 IN | BODY MASS INDEX: 40.29 KG/M2 | HEART RATE: 91 BPM | SYSTOLIC BLOOD PRESSURE: 135 MMHG | TEMPERATURE: 99.3 F | WEIGHT: 236 LBS

## 2020-02-24 LAB
ANION GAP SERPL CALCULATED.3IONS-SCNC: 12 MMOL/L (ref 3–16)
BASOPHILS ABSOLUTE: 0.1 K/UL (ref 0–0.2)
BASOPHILS RELATIVE PERCENT: 0.8 %
BUN BLDV-MCNC: 10 MG/DL (ref 7–20)
CALCIUM SERPL-MCNC: 9.1 MG/DL (ref 8.3–10.6)
CHLORIDE BLD-SCNC: 105 MMOL/L (ref 99–110)
CO2: 22 MMOL/L (ref 21–32)
CREAT SERPL-MCNC: 0.8 MG/DL (ref 0.6–1.1)
EOSINOPHILS ABSOLUTE: 0 K/UL (ref 0–0.6)
EOSINOPHILS RELATIVE PERCENT: 0.4 %
GFR AFRICAN AMERICAN: >60
GFR NON-AFRICAN AMERICAN: >60
GLUCOSE BLD-MCNC: 93 MG/DL (ref 70–99)
HCT VFR BLD CALC: 35.9 % (ref 36–48)
HEMOGLOBIN: 12.2 G/DL (ref 12–16)
LACTIC ACID: 1.5 MMOL/L (ref 0.4–2)
LYMPHOCYTES ABSOLUTE: 1.2 K/UL (ref 1–5.1)
LYMPHOCYTES RELATIVE PERCENT: 10.3 %
MCH RBC QN AUTO: 29.8 PG (ref 26–34)
MCHC RBC AUTO-ENTMCNC: 34.1 G/DL (ref 31–36)
MCV RBC AUTO: 87.4 FL (ref 80–100)
MONOCYTES ABSOLUTE: 0.2 K/UL (ref 0–1.3)
MONOCYTES RELATIVE PERCENT: 2.1 %
NEUTROPHILS ABSOLUTE: 9.8 K/UL (ref 1.7–7.7)
NEUTROPHILS RELATIVE PERCENT: 86.4 %
PDW BLD-RTO: 13.8 % (ref 12.4–15.4)
PLATELET # BLD: 158 K/UL (ref 135–450)
PMV BLD AUTO: 7.3 FL (ref 5–10.5)
POTASSIUM REFLEX MAGNESIUM: 3.9 MMOL/L (ref 3.5–5.1)
RBC # BLD: 4.11 M/UL (ref 4–5.2)
SODIUM BLD-SCNC: 139 MMOL/L (ref 136–145)
WBC # BLD: 11.4 K/UL (ref 4–11)

## 2020-02-24 PROCEDURE — 85025 COMPLETE CBC W/AUTO DIFF WBC: CPT

## 2020-02-24 PROCEDURE — 83605 ASSAY OF LACTIC ACID: CPT

## 2020-02-24 PROCEDURE — 96374 THER/PROPH/DIAG INJ IV PUSH: CPT

## 2020-02-24 PROCEDURE — 96375 TX/PRO/DX INJ NEW DRUG ADDON: CPT

## 2020-02-24 PROCEDURE — 6360000002 HC RX W HCPCS: Performed by: EMERGENCY MEDICINE

## 2020-02-24 PROCEDURE — 36415 COLL VENOUS BLD VENIPUNCTURE: CPT

## 2020-02-24 PROCEDURE — 99283 EMERGENCY DEPT VISIT LOW MDM: CPT

## 2020-02-24 PROCEDURE — 6370000000 HC RX 637 (ALT 250 FOR IP): Performed by: EMERGENCY MEDICINE

## 2020-02-24 PROCEDURE — 80048 BASIC METABOLIC PNL TOTAL CA: CPT

## 2020-02-24 RX ORDER — CEPHALEXIN 500 MG/1
500 CAPSULE ORAL 4 TIMES DAILY
Qty: 14 CAPSULE | Refills: 0 | Status: SHIPPED | OUTPATIENT
Start: 2020-02-24 | End: 2020-02-24 | Stop reason: SDUPTHER

## 2020-02-24 RX ORDER — SULFAMETHOXAZOLE AND TRIMETHOPRIM 800; 160 MG/1; MG/1
1 TABLET ORAL ONCE
Status: COMPLETED | OUTPATIENT
Start: 2020-02-24 | End: 2020-02-24

## 2020-02-24 RX ORDER — NAPROXEN 500 MG/1
500 TABLET ORAL 2 TIMES DAILY WITH MEALS
Qty: 60 TABLET | Refills: 0 | Status: SHIPPED | OUTPATIENT
Start: 2020-02-24 | End: 2020-02-24 | Stop reason: SDUPTHER

## 2020-02-24 RX ORDER — CEPHALEXIN 500 MG/1
500 CAPSULE ORAL 4 TIMES DAILY
Qty: 14 CAPSULE | Refills: 0 | Status: ON HOLD | OUTPATIENT
Start: 2020-02-24 | End: 2020-03-04 | Stop reason: ALTCHOICE

## 2020-02-24 RX ORDER — SULFAMETHOXAZOLE AND TRIMETHOPRIM 800; 160 MG/1; MG/1
1 TABLET ORAL 2 TIMES DAILY
Qty: 14 TABLET | Refills: 0 | Status: ON HOLD | OUTPATIENT
Start: 2020-02-24 | End: 2020-03-04 | Stop reason: ALTCHOICE

## 2020-02-24 RX ORDER — ONDANSETRON 2 MG/ML
4 INJECTION INTRAMUSCULAR; INTRAVENOUS EVERY 30 MIN PRN
Status: DISCONTINUED | OUTPATIENT
Start: 2020-02-24 | End: 2020-02-25 | Stop reason: HOSPADM

## 2020-02-24 RX ORDER — SULFAMETHOXAZOLE AND TRIMETHOPRIM 800; 160 MG/1; MG/1
1 TABLET ORAL 2 TIMES DAILY
Qty: 14 TABLET | Refills: 0 | Status: SHIPPED | OUTPATIENT
Start: 2020-02-24 | End: 2020-02-24 | Stop reason: SDUPTHER

## 2020-02-24 RX ORDER — CEPHALEXIN 500 MG/1
500 CAPSULE ORAL ONCE
Status: COMPLETED | OUTPATIENT
Start: 2020-02-24 | End: 2020-02-24

## 2020-02-24 RX ORDER — KETOROLAC TROMETHAMINE 30 MG/ML
15 INJECTION, SOLUTION INTRAMUSCULAR; INTRAVENOUS ONCE
Status: COMPLETED | OUTPATIENT
Start: 2020-02-24 | End: 2020-02-24

## 2020-02-24 RX ORDER — NAPROXEN 500 MG/1
500 TABLET ORAL 2 TIMES DAILY WITH MEALS
Qty: 60 TABLET | Refills: 0 | Status: SHIPPED | OUTPATIENT
Start: 2020-02-24 | End: 2020-04-27

## 2020-02-24 RX ADMIN — SULFAMETHOXAZOLE AND TRIMETHOPRIM 1 TABLET: 800; 160 TABLET ORAL at 20:20

## 2020-02-24 RX ADMIN — KETOROLAC TROMETHAMINE 15 MG: 30 INJECTION, SOLUTION INTRAMUSCULAR at 20:20

## 2020-02-24 RX ADMIN — CEPHALEXIN 500 MG: 500 CAPSULE ORAL at 20:20

## 2020-02-24 RX ADMIN — ONDANSETRON 4 MG: 2 INJECTION INTRAMUSCULAR; INTRAVENOUS at 20:20

## 2020-02-24 ASSESSMENT — PAIN SCALES - GENERAL
PAINLEVEL_OUTOF10: 6
PAINLEVEL_OUTOF10: 6

## 2020-02-24 ASSESSMENT — PAIN DESCRIPTION - PAIN TYPE: TYPE: ACUTE PAIN

## 2020-02-25 NOTE — ED TRIAGE NOTES
Pt states that she woke up with pain under her right upper arm, which has increased. Upper arm now swollen and tender to touch. Pt states that she now has abdominal pain and fevers. States that her son was recently admitted to [address] for similar symptoms, and has been on antibiotics for 1 weeks. No known source of his infection.

## 2020-02-25 NOTE — ED PROVIDER NOTES
[facility] EMERGENCY DEPARTMENT NOTE  Patient Name: Conor Mccormick  Patient Age/Sex: 62 y. o.female  DOS: 2/24/2020  7:22 PM  PCP: [facility] [place], [address]  Insurance: Payor: Pilar Burnham / Plan: Pilar Burnham (PPO) / Product Type: *No Product type* /     Triage CC:   Abdominal Pain; Arm Pain (possible cellulitis LUE); and Fever    HPI:   This is a 62 y.o. female who presents to the emergency department complaining of redness, swelling, and tenderness on the back of her right upper arm, which she first noticed this morning. She states that since that time she has had some nausea, subjective fever, mild frontal headache, and general malaise including abdominal discomfort. She reports that she has a family member who is currently being treated for skin infection as an inpatient at another hospital.  She has not had this problem before. She does report a history of multiple sclerosis, and does take immunomodulating drugs for this purpose. She does not have any new weakness, paresthesia, or loss of sensation as compared to her baseline. She denies any chest pain, shortness of breath, palpitations, photophobia, neck stiffness, dysuria, hematuria, urinary frequency, urinary urgency, or other complaints. She reports that she took a dose of Tylenol earlier today. ROS:  Pertinent items are noted in HPI. All other systems reviewed and negative, except as stated in HPI. Physical Exam:  Constitutional: Well-developed, well-nourished. Head: Normocephalic, atraumatic. Eyes: PERRL, EOMI, normal sclera. Ears/Nose: No epistaxis, no otorrhea. Neck: Supple, trachea midline, no JVD. Cardiovascular: RRR, S1/S2, no M/R/G. Pulmonary: Normal WOB, CTAB. Gastrointestinal: Abdomen soft, NT/ND, no guarding or rigidity. Musculoskeletal: Atraumatic, no peripheral edema. Integumentary: Warm, well-perfused skin. There is an area of induration, mild erythema, and tenderness on the posterior right upper arm.   Neurologic: A&Ox4, strength and documentation: YES  Certified  used: N/A    The patient was evaluated by myself and the ED Attending Physician. All management and disposition plans were discussed and agreed upon. Irving Alcantar MD  Emergency Medicine, PGY-4  Phone: [phone]    This note was dictated using voice-recognition software, which occasionally construes inadvertent typographic errors. ~~~~~~~~~~~~~~~~~~~~~~~~~~~~~~~~~~~~~~~~~~~~~~~~~~~~~~~~~~~~~~~~~~~~~~~~~  ~~~~~~~~~~~~~~~~~~~~~~~~~~~~~~~~~~~~~~~~~~~~~~~~~~~~~~~~~~~~~~~~~~~~~~~~~    Allergies:   Allergies as of 02/24/2020    (No Known Allergies)     PMH:  Past Medical History:   Diagnosis Date    Adenomatous colon polyp 11/2017    Dr. Farhana Varma 1/14/2014    Asthma     seasonal asthma    Bilateral kidney stones 12/27/2018    Cellulitis, lip     mrsa    Cervical spinal stenosis     Cholelithiasis 06/2018    Clotting disorder (HCC)     blood clot in leg as teenager bcp    Crohn's disease ([address]     DDD (degenerative disc disease), cervical     DDD (degenerative disc disease), lumbar     Depression 1/14/2014    DVT, lower extremity ([address] 1979    Due to birth control pills    Fibrocystic breast     GERD (gastroesophageal reflux disease)     Hormone disorder     Hx MRSA infection     pt states she has had several episodes of mrsa infections recently on stomach, which was tx'ed w/ antibiotics and is now (4/2013) scabbed over      Hyperlipidemia     Hypertension     Insomnia     interruption insomnia    Migraine     MS (multiple sclerosis) ([address] 6/2010    Multinodular goiter     Nausea & vomiting     Osteoarthritis     hips and shoulders    Polyp of colon, adenomatous 11/22/2017    PONV (postoperative nausea and vomiting)     severe    Rash     Right leg DVT ([address]     10years of age   Svetlana Youngblood Sleep apnea     CPAP set at 15, 11    Spondylolisthesis of lumbar region     Thyroid nodule 6/13/2013    Ureteral stone 12/16/2018    with right hydronephrosis    Ureterolithiasis 12/16/2018    with right hydronephrosis    Urinary incontinence     Venous insufficiency 03/08/13    insufficiency & reflux of legs    Weakness of left arm     DUE TO MS     PSH:  Past Surgical History:   Procedure Laterality Date    BLADDER SUSPENSION  1995    BREAST SURGERY  2000    rt breast lumpectomy-atypical    COLONOSCOPY  3/13/2013    Dr. Miguel Koch    partial     OTHER SURGICAL HISTORY Bilateral 06/14/2013    Incision and Drainage of abcess lower lip    OTHER SURGICAL HISTORY N/A 08/11/2016    OPEN REPAIR OF VENTRAL HERNIA             SALPINGO-OOPHORECTOMY  4/12/2013    SKIN BIOPSY         Family Hx:  Family History   Problem Relation Age of Onset    High Blood Pressure Mother     Diabetes Mother     Heart Disease Father         stents    Alcohol Abuse Father     COPD Brother     Schizophrenia Brother     Alcohol Abuse Brother     Diabetes Sister     High Blood Pressure Sister     Emphysema Neg Hx     Heart Failure Neg Hx     Rheum Arthritis Neg Hx     Osteoarthritis Neg Hx     Breast Cancer Neg Hx     Hypertension Neg Hx     Migraines Neg Hx     Ovarian Cancer Neg Hx     Rashes/Skin Problems Neg Hx     Seizures Neg Hx     Thyroid Disease Neg Hx        Social Hx:  Social History     Tobacco History     Smoking Status  Former Smoker Quit date  7/20/1982 Smoking Frequency  0.25 packs/day for 5 years (1.25 pk yrs) Smoking Tobacco Type  Cigarettes    Smokeless Tobacco Use  Former User          Alcohol History     Alcohol Use Status  No          Drug Use     Drug Use Status  No          Sexual Activity     Sexually Active  Yes Partners  Male                Labs:  Please review the electronic medical record for laboratory record.     Imaging:  No orders to display          Lawyer Marisol MD  Resident  02/24/20 7983

## 2020-02-26 ENCOUNTER — HOSPITAL ENCOUNTER (INPATIENT)
Age: 55
LOS: 8 days | Discharge: HOME HEALTH | DRG: 871 | End: 2020-03-05
Attending: INTERNAL MEDICINE | Admitting: INTERNAL MEDICINE
Payer: COMMERCIAL

## 2020-02-26 PROBLEM — A41.9 SEPSIS (HCC): Status: ACTIVE | Noted: 2020-02-26

## 2020-02-26 LAB
ANION GAP SERPL CALCULATED.3IONS-SCNC: 15 MMOL/L (ref 3–16)
BUN BLDV-MCNC: 46 MG/DL (ref 7–20)
CALCIUM SERPL-MCNC: 8.8 MG/DL (ref 8.3–10.6)
CHLORIDE BLD-SCNC: 105 MMOL/L (ref 99–110)
CO2: 20 MMOL/L (ref 21–32)
CREAT SERPL-MCNC: 3.7 MG/DL (ref 0.6–1.1)
GFR AFRICAN AMERICAN: 15
GFR NON-AFRICAN AMERICAN: 13
GLUCOSE BLD-MCNC: 88 MG/DL (ref 70–99)
LACTIC ACID, SEPSIS: 1.5 MMOL/L (ref 0.4–1.9)
POTASSIUM REFLEX MAGNESIUM: 4.1 MMOL/L (ref 3.5–5.1)
SODIUM BLD-SCNC: 140 MMOL/L (ref 136–145)

## 2020-02-26 PROCEDURE — 83605 ASSAY OF LACTIC ACID: CPT

## 2020-02-26 PROCEDURE — 1200000000 HC SEMI PRIVATE

## 2020-02-26 PROCEDURE — 6360000002 HC RX W HCPCS: Performed by: INTERNAL MEDICINE

## 2020-02-26 PROCEDURE — 87040 BLOOD CULTURE FOR BACTERIA: CPT

## 2020-02-26 PROCEDURE — 6370000000 HC RX 637 (ALT 250 FOR IP): Performed by: INTERNAL MEDICINE

## 2020-02-26 PROCEDURE — 80048 BASIC METABOLIC PNL TOTAL CA: CPT

## 2020-02-26 PROCEDURE — 2580000003 HC RX 258: Performed by: INTERNAL MEDICINE

## 2020-02-26 PROCEDURE — 86160 COMPLEMENT ANTIGEN: CPT

## 2020-02-26 PROCEDURE — 36415 COLL VENOUS BLD VENIPUNCTURE: CPT

## 2020-02-26 RX ORDER — SODIUM CHLORIDE 0.9 % (FLUSH) 0.9 %
10 SYRINGE (ML) INJECTION PRN
Status: DISCONTINUED | OUTPATIENT
Start: 2020-02-26 | End: 2020-03-05 | Stop reason: HOSPADM

## 2020-02-26 RX ORDER — ACETAMINOPHEN 325 MG/1
650 TABLET ORAL EVERY 6 HOURS PRN
Status: DISCONTINUED | OUTPATIENT
Start: 2020-02-26 | End: 2020-03-05 | Stop reason: HOSPADM

## 2020-02-26 RX ORDER — ACETAMINOPHEN 650 MG/1
650 SUPPOSITORY RECTAL EVERY 6 HOURS PRN
Status: DISCONTINUED | OUTPATIENT
Start: 2020-02-26 | End: 2020-03-05 | Stop reason: HOSPADM

## 2020-02-26 RX ORDER — SODIUM CHLORIDE 0.9 % (FLUSH) 0.9 %
10 SYRINGE (ML) INJECTION EVERY 12 HOURS SCHEDULED
Status: DISCONTINUED | OUTPATIENT
Start: 2020-02-26 | End: 2020-03-05 | Stop reason: HOSPADM

## 2020-02-26 RX ORDER — SODIUM CHLORIDE 9 MG/ML
INJECTION, SOLUTION INTRAVENOUS CONTINUOUS
Status: DISCONTINUED | OUTPATIENT
Start: 2020-02-26 | End: 2020-03-01

## 2020-02-26 RX ADMIN — Medication 10 ML: at 22:01

## 2020-02-26 RX ADMIN — ACETAMINOPHEN 650 MG: 650 SUPPOSITORY RECTAL at 22:01

## 2020-02-26 RX ADMIN — CEFEPIME HYDROCHLORIDE 1 G: 1 INJECTION, POWDER, FOR SOLUTION INTRAMUSCULAR; INTRAVENOUS at 23:59

## 2020-02-26 RX ADMIN — SODIUM CHLORIDE: 9 INJECTION, SOLUTION INTRAVENOUS at 18:02

## 2020-02-26 RX ADMIN — ENOXAPARIN SODIUM 40 MG: 40 INJECTION SUBCUTANEOUS at 18:02

## 2020-02-26 ASSESSMENT — PAIN SCALES - GENERAL
PAINLEVEL_OUTOF10: 2
PAINLEVEL_OUTOF10: 3

## 2020-02-26 NOTE — CONSULTS
Clinical Pharmacy Consult Note    Admit date: 2/26/2020    Subjective/Objective:  62 yof admitted for treatment of sepsis. Patient was recently treated at [facility] ED on 2/24 for possible cellulitis of R posterior arm with Bactrim/Keflex. On 2/26 patient presented to [facility] ED with fever and confusion. CT head unremarkable, but Xray chest revealed possible pneumonia. SCr also significantly elevated at 4.6 (CrCl 16 mL/min). At that time patient received doses of cefepime 2g and vancomycin 1.5g. PMHx significant for provoked DVT (not on [facility]), MS on Peginterferon, HTN, HLD, h/o MRSA (2013). Pharmacy is consulted to dose Vancomycin per Dr. Franklyn Cobos    Vancomycin history: Patient has received vancomycin 1.5g IV q12h in 2013. Trough resulted at 14.5 mcg/mL. At that time weight was 115 kg and SCr ~1.1 mg/dL. Pertinent Medications:  Cefepime 1g IV q12h -- day # 1  Vancomycin, pharmacy to dose  -- day # 1   1.5g IV x1 (2/26 @ [facility])   Intermittent based on levels    PERTINENT LABS:  Recent Labs     02/24/20 2027      K 3.9      CO2 22   BUN 10   CREATININE 0.8       Estimated Creatinine Clearance: 94 mL/min (based on SCr of 0.8 mg/dL). Recent Labs     02/24/20 2027   WBC 11.4*   HGB 12.2   HCT 35.9*   MCV 87.4          Height:  5' 4\" (162.6 cm)  Weight: 236 lb (107 kg)    Micro:  Date Site Micro Susceptibility   2/26 Blood x2 ordered                    Assessment/Plan:  1. Sepsis  :  vancomycin + cefepime day #1  Vancomycin - pharmacy to dose  · Patient received 1.5g IV x1 at [facility] ED this afternoon. At that time a SCr was drawn, resulting at 4.6 mg/dL (baseline around 1). Will dose based on levels until renal function returns to baseline. Placeholder entered. · Will draw random level with AM labs tomorrow. · Renal function will be monitored closely and dosing will be adjusted as appropriate. Cefepime  · 2g IV q8h ordered for patient.  Will adjust to 1g IV q12h based on renal function (SCr

## 2020-02-26 NOTE — PLAN OF CARE
Problem: Falls - Risk of:  Goal: Will remain free from falls  Description  Will remain free from falls  Outcome: Ongoing  Note:   Patient has remained free from falls. Bed is low and locked, bed alarm on, side rails up 2/4, non skid socks on patient. Call light and bedside table are within reach. Patient calls out appropriately. Will continue to monitor. Problem: Injury - Risk of, Physical Injury:  Goal: Will remain free from falls  Description  Will remain free from falls  Outcome: Ongoing  Note:   Patient has remained free from falls. Bed is low and locked, bed alarm on, side rails up 2/4, non skid socks on patient. Call light and bedside table are within reach. Patient calls out appropriately. Will continue to monitor.

## 2020-02-27 ENCOUNTER — APPOINTMENT (OUTPATIENT)
Dept: CT IMAGING | Age: 55
DRG: 871 | End: 2020-02-27
Attending: INTERNAL MEDICINE
Payer: COMMERCIAL

## 2020-02-27 ENCOUNTER — APPOINTMENT (OUTPATIENT)
Dept: VASCULAR LAB | Age: 55
DRG: 871 | End: 2020-02-27
Attending: INTERNAL MEDICINE
Payer: COMMERCIAL

## 2020-02-27 LAB
ANION GAP SERPL CALCULATED.3IONS-SCNC: 13 MMOL/L (ref 3–16)
BANDED NEUTROPHILS RELATIVE PERCENT: 3 % (ref 0–7)
BASOPHILS ABSOLUTE: 0 K/UL (ref 0–0.2)
BASOPHILS RELATIVE PERCENT: 0 %
BILIRUBIN URINE: NEGATIVE
BLOOD, URINE: NEGATIVE
BUN BLDV-MCNC: 44 MG/DL (ref 7–20)
C3 COMPLEMENT: 117.2 MG/DL (ref 90–180)
C4 COMPLEMENT: 33.4 MG/DL (ref 10–40)
CALCIUM SERPL-MCNC: 8.9 MG/DL (ref 8.3–10.6)
CHLORIDE BLD-SCNC: 109 MMOL/L (ref 99–110)
CLARITY: ABNORMAL
CO2: 20 MMOL/L (ref 21–32)
COLOR: YELLOW
CREAT SERPL-MCNC: 2.9 MG/DL (ref 0.6–1.1)
CREATININE URINE: 65.8 MG/DL (ref 28–259)
EOSINOPHIL,URINE: NORMAL
EOSINOPHILS ABSOLUTE: 0 K/UL (ref 0–0.6)
EOSINOPHILS RELATIVE PERCENT: 0 %
GFR AFRICAN AMERICAN: 20
GFR NON-AFRICAN AMERICAN: 17
GLUCOSE BLD-MCNC: 97 MG/DL (ref 70–99)
GLUCOSE URINE: NEGATIVE MG/DL
HCT VFR BLD CALC: 32.4 % (ref 36–48)
HEMOGLOBIN: 10.7 G/DL (ref 12–16)
KETONES, URINE: NEGATIVE MG/DL
LEUKOCYTE ESTERASE, URINE: NEGATIVE
LYMPHOCYTES ABSOLUTE: 1.7 K/UL (ref 1–5.1)
LYMPHOCYTES RELATIVE PERCENT: 11 %
MCH RBC QN AUTO: 29.5 PG (ref 26–34)
MCHC RBC AUTO-ENTMCNC: 33.2 G/DL (ref 31–36)
MCV RBC AUTO: 88.7 FL (ref 80–100)
METAMYELOCYTES RELATIVE PERCENT: 2 %
MICROALBUMIN UR-MCNC: 1.5 MG/DL
MICROALBUMIN/CREAT UR-RTO: 22.8 MG/G (ref 0–30)
MICROSCOPIC EXAMINATION: YES
MONOCYTES ABSOLUTE: 0.6 K/UL (ref 0–1.3)
MONOCYTES RELATIVE PERCENT: 4 %
MYELOCYTE PERCENT: 1 %
NEUTROPHILS ABSOLUTE: 13.3 K/UL (ref 1.7–7.7)
NEUTROPHILS RELATIVE PERCENT: 79 %
NITRITE, URINE: NEGATIVE
PDW BLD-RTO: 14.8 % (ref 12.4–15.4)
PH UA: 5.5 (ref 5–8)
PLATELET # BLD: 164 K/UL (ref 135–450)
PMV BLD AUTO: 8.3 FL (ref 5–10.5)
POTASSIUM REFLEX MAGNESIUM: 4.4 MMOL/L (ref 3.5–5.1)
PROTEIN UA: 30 MG/DL
RBC # BLD: 3.65 M/UL (ref 4–5.2)
RBC UA: NORMAL /HPF (ref 0–4)
SODIUM BLD-SCNC: 142 MMOL/L (ref 136–145)
SODIUM URINE: 102 MMOL/L
SPECIFIC GRAVITY UA: >=1.03 (ref 1–1.03)
URINE REFLEX TO CULTURE: ABNORMAL
URINE TYPE: ABNORMAL
UROBILINOGEN, URINE: 1 E.U./DL
VANCOMYCIN RANDOM: 10 UG/ML
WBC # BLD: 15.6 K/UL (ref 4–11)
WBC UA: NORMAL /HPF (ref 0–5)

## 2020-02-27 PROCEDURE — 80202 ASSAY OF VANCOMYCIN: CPT

## 2020-02-27 PROCEDURE — 80048 BASIC METABOLIC PNL TOTAL CA: CPT

## 2020-02-27 PROCEDURE — 6360000002 HC RX W HCPCS: Performed by: INTERNAL MEDICINE

## 2020-02-27 PROCEDURE — 87205 SMEAR GRAM STAIN: CPT

## 2020-02-27 PROCEDURE — 82043 UR ALBUMIN QUANTITATIVE: CPT

## 2020-02-27 PROCEDURE — 2580000003 HC RX 258: Performed by: INTERNAL MEDICINE

## 2020-02-27 PROCEDURE — 82570 ASSAY OF URINE CREATININE: CPT

## 2020-02-27 PROCEDURE — 84300 ASSAY OF URINE SODIUM: CPT

## 2020-02-27 PROCEDURE — 2700000000 HC OXYGEN THERAPY PER DAY

## 2020-02-27 PROCEDURE — 36415 COLL VENOUS BLD VENIPUNCTURE: CPT

## 2020-02-27 PROCEDURE — 92610 EVALUATE SWALLOWING FUNCTION: CPT

## 2020-02-27 PROCEDURE — 1200000000 HC SEMI PRIVATE

## 2020-02-27 PROCEDURE — 81001 URINALYSIS AUTO W/SCOPE: CPT

## 2020-02-27 PROCEDURE — 94640 AIRWAY INHALATION TREATMENT: CPT

## 2020-02-27 PROCEDURE — 85025 COMPLETE CBC W/AUTO DIFF WBC: CPT

## 2020-02-27 PROCEDURE — 94760 N-INVAS EAR/PLS OXIMETRY 1: CPT

## 2020-02-27 PROCEDURE — 73200 CT UPPER EXTREMITY W/O DYE: CPT

## 2020-02-27 PROCEDURE — 93971 EXTREMITY STUDY: CPT

## 2020-02-27 RX ORDER — MODAFINIL 200 MG/1
200 TABLET ORAL DAILY
Status: DISCONTINUED | OUTPATIENT
Start: 2020-02-27 | End: 2020-03-05 | Stop reason: HOSPADM

## 2020-02-27 RX ORDER — ALBUTEROL SULFATE 2.5 MG/3ML
2.5 SOLUTION RESPIRATORY (INHALATION) EVERY 4 HOURS PRN
Status: DISCONTINUED | OUTPATIENT
Start: 2020-02-27 | End: 2020-03-05 | Stop reason: HOSPADM

## 2020-02-27 RX ORDER — ARFORMOTEROL TARTRATE 15 UG/2ML
15 SOLUTION RESPIRATORY (INHALATION) 2 TIMES DAILY
Status: DISCONTINUED | OUTPATIENT
Start: 2020-02-27 | End: 2020-03-05 | Stop reason: HOSPADM

## 2020-02-27 RX ORDER — HEPARIN SODIUM 5000 [USP'U]/ML
5000 INJECTION, SOLUTION INTRAVENOUS; SUBCUTANEOUS EVERY 8 HOURS SCHEDULED
Status: DISCONTINUED | OUTPATIENT
Start: 2020-02-27 | End: 2020-03-05 | Stop reason: HOSPADM

## 2020-02-27 RX ORDER — 0.9 % SODIUM CHLORIDE 0.9 %
500 INTRAVENOUS SOLUTION INTRAVENOUS ONCE
Status: COMPLETED | OUTPATIENT
Start: 2020-02-27 | End: 2020-02-27

## 2020-02-27 RX ORDER — BUDESONIDE AND FORMOTEROL FUMARATE DIHYDRATE 160; 4.5 UG/1; UG/1
1 AEROSOL RESPIRATORY (INHALATION) DAILY
Status: DISCONTINUED | OUTPATIENT
Start: 2020-02-27 | End: 2020-02-27 | Stop reason: CLARIF

## 2020-02-27 RX ORDER — BUDESONIDE 0.5 MG/2ML
0.5 INHALANT ORAL 2 TIMES DAILY
Status: DISCONTINUED | OUTPATIENT
Start: 2020-02-27 | End: 2020-03-05 | Stop reason: HOSPADM

## 2020-02-27 RX ADMIN — HEPARIN SODIUM 5000 UNITS: 5000 INJECTION INTRAVENOUS; SUBCUTANEOUS at 06:42

## 2020-02-27 RX ADMIN — SODIUM CHLORIDE: 9 INJECTION, SOLUTION INTRAVENOUS at 16:39

## 2020-02-27 RX ADMIN — CEFEPIME HYDROCHLORIDE 1 G: 1 INJECTION, POWDER, FOR SOLUTION INTRAMUSCULAR; INTRAVENOUS at 12:34

## 2020-02-27 RX ADMIN — HEPARIN SODIUM 5000 UNITS: 5000 INJECTION INTRAVENOUS; SUBCUTANEOUS at 21:52

## 2020-02-27 RX ADMIN — Medication 10 ML: at 22:00

## 2020-02-27 RX ADMIN — ARFORMOTEROL TARTRATE 15 MCG: 15 SOLUTION RESPIRATORY (INHALATION) at 09:07

## 2020-02-27 RX ADMIN — VANCOMYCIN HYDROCHLORIDE 1500 MG: 10 INJECTION, POWDER, LYOPHILIZED, FOR SOLUTION INTRAVENOUS at 13:18

## 2020-02-27 RX ADMIN — BUDESONIDE 500 MCG: 0.5 SUSPENSION RESPIRATORY (INHALATION) at 09:06

## 2020-02-27 RX ADMIN — SODIUM CHLORIDE 500 ML: 9 INJECTION, SOLUTION INTRAVENOUS at 01:25

## 2020-02-27 RX ADMIN — HEPARIN SODIUM 5000 UNITS: 5000 INJECTION INTRAVENOUS; SUBCUTANEOUS at 14:31

## 2020-02-27 ASSESSMENT — PAIN SCALES - PAIN ASSESSMENT IN ADVANCED DEMENTIA (PAINAD)
BREATHING: 0
BODYLANGUAGE: 0
CONSOLABILITY: 0
FACIALEXPRESSION: 0
NEGVOCALIZATION: 1
TOTALSCORE: 1

## 2020-02-27 NOTE — CONSULTS
High Blood Pressure Mother     Diabetes Mother     Heart Disease Father         stents    Alcohol Abuse Father     COPD Brother     Schizophrenia Brother     Alcohol Abuse Brother     Diabetes Sister     High Blood Pressure Sister     Emphysema Neg Hx     Heart Failure Neg Hx     Rheum Arthritis Neg Hx     Osteoarthritis Neg Hx     Breast Cancer Neg Hx     Hypertension Neg Hx     Migraines Neg Hx     Ovarian Cancer Neg Hx     Rashes/Skin Problems Neg Hx     Seizures Neg Hx     Thyroid Disease Neg Hx         reports that she quit smoking about 37 years ago. Her smoking use included cigarettes. She has a 1.25 pack-year smoking history. She has quit using smokeless tobacco. She reports that she does not drink alcohol or use drugs.     Allergies:  Codeine    Current Medications:    modafinil (PROVIGIL) tablet 200 mg, Daily  albuterol (PROVENTIL) nebulizer solution 2.5 mg, Q4H PRN  heparin (porcine) injection 5,000 Units, 3 times per day  budesonide (PULMICORT) nebulizer suspension 500 mcg, BID    And  Arformoterol Tartrate (BROVANA) nebulizer solution 15 mcg, BID  sodium chloride flush 0.9 % injection 10 mL, 2 times per day  sodium chloride flush 0.9 % injection 10 mL, PRN  acetaminophen (TYLENOL) tablet 650 mg, Q6H PRN  acetaminophen (TYLENOL) suppository 650 mg, Q6H PRN  0.9 % sodium chloride infusion, Continuous  cefepime (MAXIPIME) 1 g IVPB minibag, Q12H  vancomycin (VANCOCIN) intermittent dosing (placeholder), See Admin Instructions      Review of Systems:   14 point ROS obtained but were negative except mentioned in HPI    Physical exam:     Vitals:  /71   Pulse 91   Temp 97.9 °F (36.6 °C) (Oral)   Resp 16   Ht 5' 4\" (1.626 m)   Wt 236 lb (107 kg)   SpO2 97%   BMI 40.51 kg/m²   Constitutional:  OAA X3 NAD  Skin: no rash, turgor wnl  Heent:  eomi, mmm  Neck: no bruits or jvd noted  Cardiovascular:  S1, S2 without m/r/g  Respiratory: CTA B without w/r/r  Abdomen: soft, nt, nd  Ext: no lower extremity edema, redness and warmth to touch of right posterior upper extremity   Psychiatric: mood and affect appropriate  Musculoskeletal: Rom, muscular strength intact    Data:   Labs:  CBC:   Recent Labs     02/24/20 2027 02/27/20 0622   WBC 11.4* 15.6*   HGB 12.2 10.7*    164     BMP:    Recent Labs     02/24/20 2027 02/26/20 1932 02/27/20 0622    140 142   K 3.9 4.1 4.4    105 109   CO2 22 20* 20*   BUN 10 46* 44*   CREATININE 0.8 3.7* 2.9*   GLUCOSE 93 88 97     Ca/Mg/Phos:   Recent Labs     02/24/20 2027 02/26/20 1932 02/27/20 0622   CALCIUM 9.1 8.8 8.9     Hepatic: No results for input(s): AST, ALT, ALB, BILITOT, ALKPHOS in the last 72 hours. Troponin: No results for input(s): TROPONINI in the last 72 hours. BNP: No results for input(s): BNP in the last 72 hours. Lipids: No results for input(s): CHOL, TRIG, HDL, LDLCALC, LABVLDL in the last 72 hours. ABGs: No results for input(s): PHART, PO2ART, QHK5WNP in the last 72 hours. INR: No results for input(s): INR in the last 72 hours. UA:  Recent Labs     02/27/20  0346   COLORU Yellow   CLARITYU SL CLOUDY*   GLUCOSEU Negative   BILIRUBINUR Negative   KETUA Negative   SPECGRAV >=1.030   BLOODU Negative   PHUR 5.5   PROTEINU 30*   UROBILINOGEN 1.0   NITRU Negative   LEUKOCYTESUR Negative   LABMICR YES   URINETYPE Voided      Urine Microscopic:   Recent Labs     02/27/20  0346   WBCUA None seen   RBCUA None seen     Urine Culture: No results for input(s): LABURIN in the last 72 hours. Urine Chemistry: No results for input(s): Willye Graft, PROTEINUR, NAUR in the last 72 hours. IMAGING:  CT HUMERUS RIGHT WO CONTRAST   Final Result      Extensive subcutis space edema/inflammation with overlying skin thickening. No abscess identified      VL Extremity Venous Right    (Results Pending)       Assessment  1.  KENAN  - Pt likely has a prerenal KENAN in the setting of her infection and poor PO intake   - Cr now 2.9, down from 3.7 on

## 2020-02-27 NOTE — PLAN OF CARE
Problem: Nutrition  Intervention: Swallow Evaluation/treatment  SLP completed evaluation. Please refer to notes in EMR.

## 2020-02-27 NOTE — PROGRESS NOTES
RESPIRATORY THERAPY ASSESSMENT    Name:  Hollie Sarkar [facility] Record Number:  [id number]  Age: 62 y.o. Gender: female  : 1965  Today's Date:  2020  Room:  3198/5084-71    Assessment     Is the patient being admitted for a COPD or Asthma exacerbation? No   (If yes the patient will be seen every 4 hours for the first 24 hours and then reassessed)    Patient Admission Diagnosis sepsis      Allergies  Allergies   Allergen Reactions    Codeine        Minimum Predicted Vital Capacity:               Actual Vital Capacity:                    Pulmonary History:Asthma  Home Oxygen Therapy:  room air  Home Respiratory Therapy:Albuterol and Salmeterol/Fluticasone Propionate   Current Respiratory Therapy:  Albuterol HHN PRN, brovana and pulmicort BID          Respiratory Severity Index(RSI)   Patients with orders for inhalation medications, oxygen, or any therapeutic treatment modality will be placed on Respiratory Protocol. They will be assessed with the first treatment and at least every 72 hours thereafter. The following severity scale will be used to determine frequency of treatment intervention.     Smoking History: Smoking History Less than 1ppd or less than 15 pack year = 1    Social History  Social History     Tobacco Use    Smoking status: Former Smoker     Packs/day: 0.25     Years: 5.00     Pack years: 1.25     Types: Cigarettes     Last attempt to quit: 1982     Years since quittin.6    Smokeless tobacco: Former User   Substance Use Topics    Alcohol use: No    Drug use: No       Recent Surgical History: None = 0  Past Surgical History  Past Surgical History:   Procedure Laterality Date   [facility]    BREAST SURGERY      rt breast lumpectomy-atypical    COLONOSCOPY  3/13/2013    Dr. Mauricio Gonzalez    partial     OTHER SURGICAL HISTORY Bilateral 2013    Incision and Drainage of abcess lower lip  OTHER SURGICAL HISTORY N/A 08/11/2016    OPEN REPAIR OF VENTRAL HERNIA             SALPINGO-OOPHORECTOMY  4/12/2013    SKIN BIOPSY         Level of Consciousness: Alert, Follows Commands but Disoriented = 1    Level of Activity: Mostly sedentary, minimal walking = 2    Respiratory Pattern: Regular Pattern; RR 8-20 = 0    Breath Sounds: Clear = 0    Sputum   ,  ,    Cough: Strong, spontaneous, non-productive = 0    Vital Signs   /63   Pulse 82   Temp 97.1 °F (36.2 °C) (Axillary)   Resp 16   Ht 5' 4\" (1.626 m)   Wt 236 lb (107 kg)   SpO2 94%   BMI 40.51 kg/m²   SPO2 (COPD values may differ): 88-89% on room air or greater than 92% on FiO2 28- 35% = 2    Peak Flow (asthma only): not applicable = 0    RSI: 5-6 = Q4hr PRN (every four hours as needed) for dyspnea        Plan       Goals: medication delivery, mobilize retained secretions, volume expansion and improve oxygenation    Patient/caregiver was educated on the proper method of use for Respiratory Care Devices:  Yes      Level of patient/caregiver understanding able to:   ? Verbalize understanding   ? Demonstrate understanding       ? Teach back        ? Needs reinforcement       ? No available caregiver               ? Other:     Response to education:  Excellent     Is patient being placed on Home Treatment Regimen? Yes     Does the patient have everything they need prior to discharge? Yes     Comments: pt admitted for sepsis. Continue home meds. Plan of Care: brovana and pulmicort bid, albuterol HHN PRN    Electronically signed by Claudy Ramon RCP on 2/27/2020 at 4:43 AM    Respiratory Protocol Guidelines     1. Assessment and treatment by Respiratory Therapy will be initiated for medication and therapeutic interventions upon initiation of aerosolized medication. 2. Physician will be contacted for respiratory rate (RR) greater than 35 breaths per minute.  Therapy will be held for heart rate (HR) greater than 140 beats per minute,

## 2020-02-27 NOTE — CONSULTS
Clinical Pharmacy Consult Note    Admit date: 2/26/2020    Interval Update: Renal function improving- KENAN thought 2/2 poor intake and infection per neprhology. WBC decreasing (17.4--> 15.6) . Blood cx pending. Remains afebrile. CT rt humerus today negative for abscess. VL RUE ordered to rule out DVT    Subjective/Objective:  62 yof admitted for treatment of sepsis. Patient was recently treated at [facility] ED on 2/24 for possible cellulitis of R posterior arm with Bactrim/Keflex. On 2/26 patient presented to [facility] ED with fever and confusion. CT head unremarkable, but Xray chest revealed possible pneumonia. SCr also significantly elevated at 4.6 (CrCl 16 mL/min). At that time patient received doses of cefepime 2g and vancomycin 1.5g. PMHx significant for provoked DVT (not on [facility]), MS on Peginterferon, HTN, HLD, h/o MRSA (2013). Pharmacy is consulted to dose Vancomycin per Dr. Mallory Hopper    Vancomycin history: Patient has received vancomycin 1.5g IV q12h in 2013. Trough resulted at 14.5 mcg/mL. At that time weight was 115 kg and SCr ~1.1 mg/dL. Pertinent Medications:    Cefepime 2g IV q 12h (12/27- current) - day #2  Vancomycin, pharmacy to dose  -- day # 2   Intermittent dosing    1500 mg IV x1 (2/26 @ 1254 @ [facility])   1500 mg IV x1 2/27 @ 1318           PERTINENT LABS:  Recent Labs     02/26/20 1932 02/27/20  0622    142   K 4.1 4.4    109   CO2 20* 20*   BUN 46* 44*   CREATININE 3.7* 2.9*       Estimated Creatinine Clearance: 26 mL/min (A) (based on SCr of 2.9 mg/dL (H)).      Recent Labs     02/24/20 2027 02/27/20  0622   WBC 11.4* 15.6*   HGB 12.2 10.7*   HCT 35.9* 32.4*   MCV 87.4 88.7    164       Height:  5' 4\" (162.6 cm)  Weight: 236 lb (107 kg)    Micro:  Date Site Micro Susceptibility   2/26 Blood x2 Pending                    Vancomycin doses/ levels:  Date Time Dose Level   2/26 1254 1500 mg ( [facility] [place])    2/27 0622  10.0 mcg/mL   2/27 1318 1500 mg Assessment/Plan:  1. Sepsis/ poss. pneumonia  :  vancomycin + cefepime day #2  Vancomycin - pharmacy to dose  · Patient received 1500 mg IV x1 at 1300 yesterday at [facility]. · SCr at [facility] was 4.7 in am. Improved to 3.7 last albert, and 2.9 today. · As pt still w/ KENAN, will continue dosing intermittently based on random levels for now. · Random level this am = 10.0 mcg/mL  (drawn 15.5h after dose given)  · Will order 1500 mg x1 dose today  · Random level tomorrow am.   · Will start scheduled dosing once renal function closer to baseline. Cefepime  · 2g IV q8h initially ordered; adjusted by pharmacy on admission to 1g IV q12h due to SCr 4.7. · Recommended renal dose adjustment (for 2g IV q8h indication) is 2g IV q 12h for CrCl 30-60 ml/min or  1g IV q12h for CrCl 11-29ml/min. · Estimated CrCl based on am labs is 26 ml/min. Expecting continued improvement in renal function. Approaching dosing cutoff-  will increase dose to 2g IV q 12h- next dose at midnight. · Renal function will be monitored closely and dosing will be adjusted as appropriate. Please call with any questions. [phone] ([email or web address])   [phone] ([address] [place] [address])    Mary Bello. Jc JACOBS   2/27/2020 1:32 PM

## 2020-02-27 NOTE — PROGRESS NOTES
Patient seen and evaluated at the bedside. Patient presented to hospital for confusion, right arm cellulitis, acute kidney injury. Patient started on IV Rocephin, fluids, nephrology following appreciate recommendations. Labs, consult notes, medication list and radiology reviewed.

## 2020-02-27 NOTE — PLAN OF CARE
Problem: Falls - Risk of:  Goal: Will remain free from falls  Description  Will remain free from falls  2/27/2020 0655 by Blas Pompa RN  Outcome: Met This Shift   Pt free from injury this shift and free of falls. 2/4 rails up on bed and bed is in the lowest position. Wheels locked and bed alarm set. Socks on pt and ID bands on pt. Call light in reach of pt and family educated to call out for pt to get up via maxi-move. [employer]as[employer] on. Will continue to monitor for safety.

## 2020-02-27 NOTE — H&P
WHEEZING, SHORTNESS OF BREATH OR COUGHING 54 g 0    omeprazole (PRILOSEC) 40 MG delayed release capsule TAKE 1 CAPSULE TWICE DAILY (NEED MD APPOINTMENT) 180 capsule 1    SYMBICORT 160-4.5 MCG/ACT AERO INHALE 2 PUFFS INTO THE LUNGS 2 TIMES DAILY 3 Inhaler 3    gabapentin (NEURONTIN) 100 MG capsule TAKE 1 CAPSULE TWICE DAILY 180 capsule 1    fenofibrate 160 MG tablet TAKE 1 TABLET EVERY DAY 90 tablet 1    fluticasone (FLONASE) 50 MCG/ACT nasal spray 2 sprays by Nasal route daily 1 Bottle 2    magnesium oxide (MAG-OX) 400 (241.3 MG) MG TABS tablet TAKE 1 TABLET BY MOUTH DAILY 90 tablet 6    polyethylene glycol (MIRALAX) powder Take 17 g by mouth daily 510 g 0    loratadine (CLARITIN) 10 MG tablet Take 1 tablet by mouth daily 90 tablet 3    DULoxetine (CYMBALTA) 60 MG capsule Take 60 mg by mouth 2 times daily      modafinil (PROVIGIL) 100 MG tablet Take 200 mg by mouth daily       baclofen (LIORESAL) 20 MG tablet TAKE 1 TABLET TWICE DAILY 180 tablet 1    Cholecalciferol (D-2000 MAXIMUM STRENGTH) 2000 UNITS TABS TAKE ONE TABLET BY MOUTH ONCE DAILY 90 tablet 1       Allergies: Allergies   Allergen Reactions    Codeine         Social History:  Patient Lives with her    reports that she quit smoking about 37 years ago. Her smoking use included cigarettes. She has a 1.25 pack-year smoking history. She has quit using smokeless tobacco. She reports that she does not drink alcohol or use drugs. Family History:  family history includes Alcohol Abuse in her brother and father; COPD in her brother; Diabetes in her mother and sister; Heart Disease in her father; High Blood Pressure in her mother and sister; Schizophrenia in her brother.      Physical Exam:  BP 99/65   Pulse 81   Temp 97 °F (36.1 °C) (Axillary)   Resp 16   Ht 5' 4\" (1.626 m)   Wt 236 lb (107 kg)   SpO2 96%   BMI 40.51 kg/m²   Patient answers question at time  Moving all extremities, during her whole interview patient has been shaking

## 2020-02-27 NOTE — CARE COORDINATION
Case Management Assessment           Initial Evaluation                Date / Time of Evaluation: 2/27/2020 9:53 AM                 Assessment Completed by: Rowdy Marin    Patient Name: Nir Damon     YOB: 1965  Diagnosis: Sepsis [facility]) [A41.9]     Date / Time: 2/26/2020  4:56 PM    Patient Admission Status: Inpatient    If patient is discharged prior to next notation, then this note serves as note for discharge by case management. Current PCP: Velma Sepulveda,   Clinic Patient: No    Chart Reviewed: Yes  Patient/ Family Interviewed: Yes    Initial assessment completed at bedside with: patient, daughter and grand daughter    Hospitalization in the last 30 days: No    Emergency Contacts:  Extended Emergency Contact Information  Primary Emergency Contact: [address] Phone: [phone]  Relation: Spouse    Advance Directives:   Code Status: Full [address]: Yes  Agent: Kathryn Hill  Contact Number: NA    Copy present: Yes     In paper Chart: No    Scanned into EMR Yes    Financial  Payor: Melissa Yuen / Plan: Melissa Yuen (PPO) / Product Type: *No Product type* /     Pre-cert required for SNF: Yes    [facility] [phone] - F [phone]  [address] [place] [address]  Phone: [phone] Fax: [phone] [place] [address],[address], [address] [phone] Wilmer Noe [phone]  [address] [address]  Phone: [phone] Fax: [phone]      Potential assistance Purchasing Medications: Potential Assistance Purchasing Medications: No  Does Patient want to participate in local refill/ meds to beds program?: No    Meds To Beds General Rules:  1. Can ONLY be done Monday- Friday between 8:30am-5pm  2. Prescription(s) must be in pharmacy by 3pm to be filled same day  3. Copy of patient's insurance/

## 2020-02-28 LAB
ANION GAP SERPL CALCULATED.3IONS-SCNC: 12 MMOL/L (ref 3–16)
BASOPHILS ABSOLUTE: 0 K/UL (ref 0–0.2)
BASOPHILS RELATIVE PERCENT: 0 %
BUN BLDV-MCNC: 24 MG/DL (ref 7–20)
CALCIUM SERPL-MCNC: 9.3 MG/DL (ref 8.3–10.6)
CHLORIDE BLD-SCNC: 107 MMOL/L (ref 99–110)
CO2: 20 MMOL/L (ref 21–32)
CREAT SERPL-MCNC: 1.4 MG/DL (ref 0.6–1.1)
EOSINOPHILS ABSOLUTE: 0 K/UL (ref 0–0.6)
EOSINOPHILS RELATIVE PERCENT: 0 %
GFR AFRICAN AMERICAN: 47
GFR NON-AFRICAN AMERICAN: 39
GLUCOSE BLD-MCNC: 99 MG/DL (ref 70–99)
HCT VFR BLD CALC: 34 % (ref 36–48)
HEMOGLOBIN: 11.1 G/DL (ref 12–16)
LYMPHOCYTES ABSOLUTE: 1.1 K/UL (ref 1–5.1)
LYMPHOCYTES RELATIVE PERCENT: 7 %
MCH RBC QN AUTO: 29.2 PG (ref 26–34)
MCHC RBC AUTO-ENTMCNC: 32.6 G/DL (ref 31–36)
MCV RBC AUTO: 89.5 FL (ref 80–100)
MONOCYTES ABSOLUTE: 1 K/UL (ref 0–1.3)
MONOCYTES RELATIVE PERCENT: 6 %
NEUTROPHILS ABSOLUTE: 14 K/UL (ref 1.7–7.7)
NEUTROPHILS RELATIVE PERCENT: 87 %
PDW BLD-RTO: 15 % (ref 12.4–15.4)
PLATELET # BLD: 66 K/UL (ref 135–450)
PLATELET SLIDE REVIEW: ABNORMAL
PMV BLD AUTO: 8.8 FL (ref 5–10.5)
POLYCHROMASIA: ABNORMAL
POTASSIUM REFLEX MAGNESIUM: 4.2 MMOL/L (ref 3.5–5.1)
RBC # BLD: 3.8 M/UL (ref 4–5.2)
SODIUM BLD-SCNC: 139 MMOL/L (ref 136–145)
TEAR DROP CELLS: ABNORMAL
VANCOMYCIN RANDOM: 10.3 UG/ML
WBC # BLD: 16.1 K/UL (ref 4–11)

## 2020-02-28 PROCEDURE — 80202 ASSAY OF VANCOMYCIN: CPT

## 2020-02-28 PROCEDURE — 6360000002 HC RX W HCPCS: Performed by: INTERNAL MEDICINE

## 2020-02-28 PROCEDURE — 97535 SELF CARE MNGMENT TRAINING: CPT

## 2020-02-28 PROCEDURE — 2580000003 HC RX 258: Performed by: INTERNAL MEDICINE

## 2020-02-28 PROCEDURE — 97116 GAIT TRAINING THERAPY: CPT

## 2020-02-28 PROCEDURE — 94761 N-INVAS EAR/PLS OXIMETRY MLT: CPT

## 2020-02-28 PROCEDURE — 97161 PT EVAL LOW COMPLEX 20 MIN: CPT

## 2020-02-28 PROCEDURE — 6370000000 HC RX 637 (ALT 250 FOR IP): Performed by: INTERNAL MEDICINE

## 2020-02-28 PROCEDURE — 97165 OT EVAL LOW COMPLEX 30 MIN: CPT

## 2020-02-28 PROCEDURE — 97530 THERAPEUTIC ACTIVITIES: CPT

## 2020-02-28 PROCEDURE — 85025 COMPLETE CBC W/AUTO DIFF WBC: CPT

## 2020-02-28 PROCEDURE — 80048 BASIC METABOLIC PNL TOTAL CA: CPT

## 2020-02-28 PROCEDURE — 94640 AIRWAY INHALATION TREATMENT: CPT

## 2020-02-28 PROCEDURE — 1200000000 HC SEMI PRIVATE

## 2020-02-28 PROCEDURE — 36415 COLL VENOUS BLD VENIPUNCTURE: CPT

## 2020-02-28 RX ADMIN — BUDESONIDE 500 MCG: 0.5 SUSPENSION RESPIRATORY (INHALATION) at 09:29

## 2020-02-28 RX ADMIN — Medication 10 ML: at 10:06

## 2020-02-28 RX ADMIN — HEPARIN SODIUM 5000 UNITS: 5000 INJECTION INTRAVENOUS; SUBCUTANEOUS at 15:00

## 2020-02-28 RX ADMIN — ACETAMINOPHEN 650 MG: 325 TABLET ORAL at 23:59

## 2020-02-28 RX ADMIN — CEFEPIME HYDROCHLORIDE 2 G: 2 INJECTION, POWDER, FOR SOLUTION INTRAVENOUS at 13:00

## 2020-02-28 RX ADMIN — CEFEPIME HYDROCHLORIDE 2 G: 2 INJECTION, POWDER, FOR SOLUTION INTRAVENOUS at 00:13

## 2020-02-28 RX ADMIN — SODIUM CHLORIDE: 9 INJECTION, SOLUTION INTRAVENOUS at 23:53

## 2020-02-28 RX ADMIN — SODIUM CHLORIDE: 9 INJECTION, SOLUTION INTRAVENOUS at 17:34

## 2020-02-28 RX ADMIN — Medication 10 ML: at 22:52

## 2020-02-28 RX ADMIN — ACETAMINOPHEN 650 MG: 325 TABLET ORAL at 15:42

## 2020-02-28 RX ADMIN — MODAFINIL 200 MG: 200 TABLET ORAL at 10:55

## 2020-02-28 RX ADMIN — ARFORMOTEROL TARTRATE 15 MCG: 15 SOLUTION RESPIRATORY (INHALATION) at 09:29

## 2020-02-28 RX ADMIN — HEPARIN SODIUM 5000 UNITS: 5000 INJECTION INTRAVENOUS; SUBCUTANEOUS at 21:59

## 2020-02-28 RX ADMIN — CEFEPIME HYDROCHLORIDE 2 G: 2 INJECTION, POWDER, FOR SOLUTION INTRAVENOUS at 23:53

## 2020-02-28 RX ADMIN — VANCOMYCIN HYDROCHLORIDE 1500 MG: 10 INJECTION, POWDER, LYOPHILIZED, FOR SOLUTION INTRAVENOUS at 10:06

## 2020-02-28 RX ADMIN — HEPARIN SODIUM 5000 UNITS: 5000 INJECTION INTRAVENOUS; SUBCUTANEOUS at 06:39

## 2020-02-28 ASSESSMENT — PAIN SCALES - GENERAL
PAINLEVEL_OUTOF10: 2
PAINLEVEL_OUTOF10: 3
PAINLEVEL_OUTOF10: 3

## 2020-02-28 ASSESSMENT — PAIN DESCRIPTION - ONSET
ONSET: ON-GOING
ONSET: ON-GOING

## 2020-02-28 ASSESSMENT — PAIN DESCRIPTION - FREQUENCY
FREQUENCY: CONTINUOUS
FREQUENCY: CONTINUOUS

## 2020-02-28 ASSESSMENT — PAIN DESCRIPTION - PAIN TYPE
TYPE: ACUTE PAIN
TYPE: ACUTE PAIN

## 2020-02-28 ASSESSMENT — PAIN DESCRIPTION - PROGRESSION
CLINICAL_PROGRESSION: GRADUALLY WORSENING
CLINICAL_PROGRESSION: GRADUALLY WORSENING

## 2020-02-28 ASSESSMENT — PAIN DESCRIPTION - ORIENTATION
ORIENTATION: RIGHT
ORIENTATION: RIGHT

## 2020-02-28 ASSESSMENT — PAIN DESCRIPTION - DESCRIPTORS: DESCRIPTORS: ACHING;DISCOMFORT

## 2020-02-28 ASSESSMENT — PAIN DESCRIPTION - LOCATION
LOCATION: ARM;HEAD
LOCATION: ARM

## 2020-02-28 ASSESSMENT — PAIN - FUNCTIONAL ASSESSMENT
PAIN_FUNCTIONAL_ASSESSMENT: ACTIVITIES ARE NOT PREVENTED
PAIN_FUNCTIONAL_ASSESSMENT: PREVENTS OR INTERFERES SOME ACTIVE ACTIVITIES AND ADLS

## 2020-02-28 NOTE — PROGRESS NOTES
Occupational Therapy   Occupational Therapy Initial Assessment/Tx  Date: 2020   Patient Name: Chuck Marcelo  MRN: [id number]     : 1965    Date of Service: 2020    Discharge Recommendations:  Chuck Marcelo scored a 20/24 on the AM-PAC ADL Inpatient form. Current research shows that an AM-PAC score of 18 or greater is typically associated with a discharge to the patient's home setting. Based on the patients AM-PAC score and their current ADL deficits, it is recommended that the patient have 2-3 sessions per week of Occupational Therapy at d/c to increase the patients independence. SEE ASSESSMENT       OT Equipment Recommendations  Equipment Needed: No  Other: may benefit from shower chair, pending progress    Assessment   Performance deficits / Impairments: Decreased functional mobility ; Decreased ADL status; Decreased endurance  Assessment: Pt admitted with sepsis related to UE cellulitis. Pt has not been up since admitted , demo guarded mobility requring RW and assist of 1 for transfers and ambulation in hallway. Pt would require assist of 1 for standing ADLs. Anticipate pt will progress well with increased activity this admission. Encouraged pt to sit in chair for meals and ambulate with staff. Will follow as inpt, anticipate no OT needs upon d/c. Family is able to provide 24hr assist prn  Treatment Diagnosis: Impaired ADLs, mobility and endurance d/t sepsis  Prognosis: Good  Decision Making: Low Complexity  OT Education: OT Role;Plan of Care;Transfer Training;ADL Adaptive Strategies  Patient Education: activity promotion- pt verb understanding  REQUIRES OT FOLLOW UP: Yes  Activity Tolerance  Activity Tolerance: Patient limited by fatigue  Safety Devices  Safety Devices in place: Yes  Type of devices: Nurse notified; Chair alarm in place;Call light within reach; Left in chair           Treatment Diagnosis: Impaired ADLs, mobility and endurance d/t sepsis      Restrictions  Position Activity Restriction  Other position/activity restrictions: up as tolerated    Subjective   General  Chart Reviewed: Yes  Additional Pertinent Hx: Pt presented 2/26 with AMS, R arm swelling, open cat bite L wrist. Admitted for sepsis related to R UE cellulitis, ARF. CT R humerus: Extensive subcutis space edema/inflammation with overlying skin thickening. No abscess identified; CT head (-), R UE dopplers (-). PMHx: Multiple sclerosis, insomnia, HTN, HLD, depression, DDD, asthma  Family / Caregiver Present: Yes(daughter)  Referring Practitioner: Noemi Leon MD  Diagnosis: sepsis  Subjective  Subjective: Pt in bed upon entry, agreeable to therapy evaluations. States she has not been out of bed during admission, agreeable to mobility  Patient Currently in Pain: Denies    Social/Functional History  Social/Functional History  Lives With: Spouse(and grandaughter (14y/o))  Type of Home: House  Home Layout: One level  Home Access: Level entry  Bathroom Shower/Tub: Walk-in shower  Bathroom Toilet: Handicap height  Home Equipment: Cane  ADL Assistance: Independent  Homemaking Assistance: Independent  Ambulation Assistance: Independent(without AD)  Transfer Assistance: Independent  Active : Yes  Occupation: Full time employment  Type of occupation: [employer] - 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         Objective   Vision: Within Functional Limits  Hearing: Within functional limits    Orientation  Overall Orientation Status: Within Normal Limits     Balance  Sitting Balance: Independent  Standing Balance: Stand by assistance(static stance)    Standing Balance  Time: 1 minute, 5 minutes  Activity: bathroom and hallway mobility, transfers, grooming    Functional Mobility  Functional - Mobility Device: No device  Activity: To/from bathroom  Assist Level: Contact guard assistance  Functional Mobility Comments: used no AD for bathroom mobility- pt guarded and unsteady, has several miss-steps. Trialed RW for mobility in room/hallway with cga-sba and improved steadiness noted. Mobility limited by pt fatigue    Toilet Transfers  Toilet - Technique: Ambulating  Equipment Used: Standard toilet(L grab bar)  Toilet Transfer: Contact guard assistance    ADL  Grooming: Supervision(wash hands standing at sink)  LE Dressing: Independent(don socks seated eob; would require balance assist when pulling up clothing )  Toileting: (borrego catheter)       Tone RUE  RUE Tone: Normotonic  Tone LUE  LUE Tone: Normotonic  Coordination  Movements Are Fluid And Coordinated: Yes     Bed mobility  Supine to Sit: Supervision     Transfers  Sit to stand: Contact guard assistance(bed x2)  Stand to sit: Contact guard assistance(bed, chair)     Cognition  Overall Cognitive Status: WFL                 LUE AROM (degrees)  LUE AROM : WNL  RUE AROM (degrees)  RUE AROM : WNL  RUE General AROM: noted edema and redness proximal arm but pt denied pain and ROM WFL  LUE Strength  Gross LUE Strength: WFL(observed during ADLs)  RUE Strength  Gross RUE Strength: WFL(observed during ADLs)           Treatment consisted of:  ADLs, transfer training, education on activity promotion and fall precautions.       Plan   Plan  Times per week: 2-5x  Times per day: Daily  Current Treatment Recommendations: Safety Education & Training, Patient/Caregiver Education & Training, Self-Care / ADL, Functional Mobility Training, Endurance Training      AM-PAC Score  AM-[facility] Inpatient Daily Activity Raw Score: 20 (02/28/20 1448)  AM-PAC Inpatient ADL T-Scale Score : 42.03 (02/28/20 1448)  ADL Inpatient CMS 0-100% Score: 38.32 (02/28/20 1448)  ADL Inpatient CMS G-Code Modifier : Kaitlyn Lashay (02/28/20 1448)    Goals  Short term goals  Time Frame for Short term goals: by discharge   Short term goal 1: supervision toilet transfer- not met  Short term goal 2: supervision LB dressing- not met  Short term goal 3: tolerate

## 2020-02-28 NOTE — PROGRESS NOTES
Patient alert and orientated, becomes disorientated at times. VSS. No changes in neuro status over night. Patient currently denies pain. Gupta in place, draining appropriately. Fall precautions in place. Call light within reach. Will continue to assess and monitor.

## 2020-02-28 NOTE — PROGRESS NOTES
34.0*    66*     Recent Labs     02/26/20  1932 02/27/20  0622 02/28/20  0526    142 139   K 4.1 4.4 4.2    109 107   CO2 20* 20* 20*   BUN 46* 44* 24*   CREATININE 3.7* 2.9* 1.4*   CALCIUM 8.8 8.9 9.3     No results for input(s): AST, ALT, BILIDIR, BILITOT, ALKPHOS in the last 72 hours. No results for input(s): INR in the last 72 hours. No results for input(s): Carmine Jun in the last 72 hours. Urinalysis:      Lab Results   Component Value Date    NITRU Negative 02/27/2020    WBCUA None seen 02/27/2020    RBCUA None seen 02/27/2020    BLOODU Negative 02/27/2020    SPECGRAV >=1.030 02/27/2020    GLUCOSEU Negative 02/27/2020       Radiology:  VL Extremity Venous Right   Final Result      CT HUMERUS RIGHT WO CONTRAST   Final Result      Extensive subcutis space edema/inflammation with overlying skin thickening. No abscess identified        Assessment/Plan:    Active Hospital Problems    Diagnosis    Sepsis ([facility] [address] [address] [A41.9]     -Sepsis, secondary to right arm cellulitis:   -IV fluids, Vancomycin,cefepime   -Cont. To monitor for improvement.  -Acute renal failure:   -improving.   -Candy, nephrology consulted. -cont. IVF.   -Multiple sclerosis with possible movement disorder:   -may f/u outpatient with neurology/movement disorder clinic.      -Acute metabolic encephalopathy   -Infection might be contributing, monitor   -appears to be improving. DVT Prophylaxis: heparin. Diet: DIET LOW SODIUM 2 GM;  Code Status: Full Code    PT/OT Eval Status: ordered    Dispo - 1-2 days, pending further hospital course.      Willam Santamaria MD

## 2020-02-28 NOTE — CARE COORDINATION
Case Management Assessment           Daily Note                 Date/ Time of Note: 2/28/2020 2:08 PM         Note completed by: Claudette Wells    Patient Name: Natalia Candelaria  YOB: 1965    Diagnosis:Sepsis [facility]) [A41.9]  Patient Admission Status: Inpatient    Date of Admission:2/26/2020  4:56 PM Length of Stay: 2 GLOS:        Current Plan of Care: Return to home  ________________________________________________________________________________________  PT AM-PAC:   / 24 per last evaluation on: NA    OT AM-PAC:   / 24 per last evaluation on: NA    DME Needs for discharge: unknown    ________________________________________________________________________________________  Discharge Plan: To Be Determined DUE TO: awaiting PT/OT evals and cognitive improvement    Tentative discharge date: TBD      Current barriers to discharge: WBC elevated, creatinine decreased, PT/OT evals    Referrals completed: Not Applicable    Resources/ information provided: Not indicated at this time  ________________________________________________________________________________________  Case Management Notes:Pt seems improved today. She did not speak with me at all yesterday and instead just kept looking in the corner of the room. She did speak at times yesterday according to the nurse but today seems more vocal. PT/OT has been ordered as well. Angelo Walker and her family were provided with choice of provider; she and her family are in agreement with the discharge plan.     Care Transition Patient: Olive Wells RN  [facility]  Case Management Department  Ph: [phone]  Fax: [phone]

## 2020-02-28 NOTE — CONSULTS
mcg/mL   2/27 1318 1500 mg     2/28 0526  10.3 mcg/mL           Assessment/Plan:  1. Sepsis/ RUE cellulitis  :  vancomycin + cefepime  -  day #3  Vancomycin - pharmacy to dose  · Renal function continues improving. SCr 1.4 today. Baseline 0.8. · Random level this am 10.3 mcg/mL (16h level)  · Will begin scheduled dosing of 1500 mg IV q 18h. Cefepime  · Dosing for sepsis in normal renal function is 2g IV q 8h  · Recommended renal dose adjustment is 2g IV q 12h for CrCl 30-60 ml/min or  1g IV q12h for CrCl 11-29ml/min. · Current dosing of 2g IV q 12h appropriate for CrCl 54 ml/min  · Renal function will be monitored closely and dosing will be adjusted as appropriate. Please call with questions:  [phone] ([phone]) [phone] ([address] [place] [address])    Thank you for the consult  [place]. D.  2/28/2020    3:34 PM

## 2020-02-28 NOTE — PLAN OF CARE
Problem: Falls - Risk of:  Goal: Will remain free from falls  Description  Will remain free from falls  2/28/2020 1253 by Sami Crooks RN  Outcome: Ongoing   All fall precautions in place. Bed locked and in lowest position with alarm on. Over-bed table and personal belongings within reach. Patient instructed to use call light for assistance. Non-skids socks on. Camera on in patient room for patient safety. Will continue to monitor. Problem: Risk for Impaired Skin Integrity  Goal: Tissue integrity - skin and mucous membranes  Description  Structural intactness and normal physiological function of skin and  mucous membranes. Outcome: Ongoing   Patient is at risk for impaired skin integrity. Patient checked frequently for moisture and changed as needed. Patient encouraged to turn and reposition self frequently and turns well independently. Gupta in place. Will continue to assess and monitor.

## 2020-02-28 NOTE — PROGRESS NOTES
breath sounds. No wheezing. Abdominal:      General: Bowel sounds are normal.      Palpations: Abdomen is soft. Tenderness: There is no abdominal tenderness. Musculoskeletal:         General: Swelling present. Comments: Erythema, swelling in RUE. Warm to touch   Skin:     General: Skin is warm and dry. Capillary Refill: Capillary refill takes less than 2 seconds. Neurological:      General: No focal deficit present. Mental Status: She is alert and oriented to person, place, and time. Cranial Nerves: No cranial nerve deficit. LABS:    CBC:   Recent Labs     02/27/20 0622 02/28/20  0526   WBC 15.6* 16.1*   HGB 10.7* 11.1*   HCT 32.4* 34.0*    66*   MCV 88.7 89.5     Renal:    Recent Labs     02/26/20 1932 02/27/20 0622 02/28/20  0526    142 139   K 4.1 4.4 4.2    109 107   CO2 20* 20* 20*   BUN 46* 44* 24*   CREATININE 3.7* 2.9* 1.4*   GLUCOSE 88 97 99   CALCIUM 8.8 8.9 9.3   ANIONGAP 15 13 12     Hepatic: No results for input(s): AST, ALT, BILITOT, BILIDIR, PROT, LABALBU, ALKPHOS in the last 72 hours. Troponin: No results for input(s): TROPONINI in the last 72 hours. BNP: No results for input(s): BNP in the last 72 hours. Lipids: No results for input(s): CHOL, HDL in the last 72 hours. Invalid input(s): LDLCALCU, TRIGLYCERIDE  ABGs:  No results for input(s): PHART, DYV6BJT, PO2ART, WVC0QUU, BEART, THGBART, B4CUEFHP, GZM6TFV in the last 72 hours. INR: No results for input(s): INR in the last 72 hours. Lactate: No results for input(s): LACTATE in the last 72 hours. Cultures:  -----------------------------------------------------------------  RAD:   VL Extremity Venous Right   Final Result      CT HUMERUS RIGHT WO CONTRAST   Final Result      Extensive subcutis space edema/inflammation with overlying skin thickening. No abscess identified            Assessment/Plan: KENAN  Creatinine 3.7 on admission. Improved with IVF with good urine output.

## 2020-02-28 NOTE — PROGRESS NOTES
disease), cervical, DDD (degenerative disc disease), lumbar, Depression, DVT, lower extremity ([address] Fibrocystic breast, GERD (gastroesophageal reflux disease), Hormone disorder, Hx MRSA infection, Hyperlipidemia, Hypertension, Insomnia, Migraine, MS (multiple sclerosis) ([address] Multinodular goiter, Nausea & vomiting, Osteoarthritis, Polyp of colon, adenomatous, PONV (postoperative nausea and vomiting), Rash, Right leg DVT ([address] Sleep apnea, Spondylolisthesis of lumbar region, Thyroid nodule, Ureteral stone, Ureterolithiasis, Urinary incontinence, Venous insufficiency, and Weakness of left arm. has a past surgical history that includes bladder suspension (1995); Hysterectomy (1995); Breast surgery (2000); Colonoscopy (3/13/2013); skin biopsy; Salpingo-oophorectomy (4/12/2013); other surgical history (Bilateral, 06/14/2013); Diagnostic Cardiac Cath Lab Procedure; and other surgical history (N/A, 08/11/2016). Restrictions  Position Activity Restriction  Other position/activity restrictions: up as tolerated  Vision/Hearing  Vision: Within Functional Limits  Hearing: Within functional limits     Subjective  General  Chart Reviewed: Yes  Additional Pertinent Hx: 62 yof admitted for treatment of sepsis. Patient was recently treated at [facility] ED on 2/24 for possible cellulitis of R posterior arm with Bactrim/Keflex. On 2/26 patient presented to [facility] ED with fever and confusion. CT head unremarkable, but Xray chest revealed possible pneumonia. Family / Caregiver Present: Yes(daughter)  Referring Practitioner: MD Vernon  Diagnosis: Sepsis  Follows Commands: Within Functional Limits  Subjective  Subjective: Pt found supine in bed upon arrival, denying pain and agreeable to therapy.    Pain Screening  Patient Currently in Pain: Denies          Orientation  Orientation  Overall Orientation Status: Within Normal Limits  Social/Functional History  Social/Functional History  Lives With: Spouse(and grandaughter (14y/o))  Type

## 2020-02-29 ENCOUNTER — APPOINTMENT (OUTPATIENT)
Dept: CT IMAGING | Age: 55
DRG: 871 | End: 2020-02-29
Attending: INTERNAL MEDICINE
Payer: COMMERCIAL

## 2020-02-29 LAB
ANION GAP SERPL CALCULATED.3IONS-SCNC: 12 MMOL/L (ref 3–16)
BASOPHILS ABSOLUTE: 0 K/UL (ref 0–0.2)
BASOPHILS RELATIVE PERCENT: 0 %
BUN BLDV-MCNC: 18 MG/DL (ref 7–20)
CALCIUM SERPL-MCNC: 9.3 MG/DL (ref 8.3–10.6)
CHLORIDE BLD-SCNC: 107 MMOL/L (ref 99–110)
CO2: 22 MMOL/L (ref 21–32)
CREAT SERPL-MCNC: 1 MG/DL (ref 0.6–1.1)
EOSINOPHILS ABSOLUTE: 0 K/UL (ref 0–0.6)
EOSINOPHILS RELATIVE PERCENT: 0 %
GFR AFRICAN AMERICAN: >60
GFR NON-AFRICAN AMERICAN: 57
GLUCOSE BLD-MCNC: 88 MG/DL (ref 70–99)
HCT VFR BLD CALC: 29.9 % (ref 36–48)
HEMOGLOBIN: 10 G/DL (ref 12–16)
LYMPHOCYTES ABSOLUTE: 2.3 K/UL (ref 1–5.1)
LYMPHOCYTES RELATIVE PERCENT: 24 %
MCH RBC QN AUTO: 29.5 PG (ref 26–34)
MCHC RBC AUTO-ENTMCNC: 33.6 G/DL (ref 31–36)
MCV RBC AUTO: 88 FL (ref 80–100)
METAMYELOCYTES RELATIVE PERCENT: 1 %
MONOCYTES ABSOLUTE: 0.9 K/UL (ref 0–1.3)
MONOCYTES RELATIVE PERCENT: 9 %
NEUTROPHILS ABSOLUTE: 6.4 K/UL (ref 1.7–7.7)
NEUTROPHILS RELATIVE PERCENT: 66 %
PDW BLD-RTO: 14.5 % (ref 12.4–15.4)
PLATELET # BLD: 193 K/UL (ref 135–450)
PLATELET SLIDE REVIEW: ABNORMAL
PMV BLD AUTO: 8.3 FL (ref 5–10.5)
POLYCHROMASIA: ABNORMAL
POTASSIUM REFLEX MAGNESIUM: 4 MMOL/L (ref 3.5–5.1)
RBC # BLD: 3.4 M/UL (ref 4–5.2)
SODIUM BLD-SCNC: 141 MMOL/L (ref 136–145)
TEAR DROP CELLS: ABNORMAL
WBC # BLD: 9.6 K/UL (ref 4–11)

## 2020-02-29 PROCEDURE — 73200 CT UPPER EXTREMITY W/O DYE: CPT

## 2020-02-29 PROCEDURE — 80048 BASIC METABOLIC PNL TOTAL CA: CPT

## 2020-02-29 PROCEDURE — 36415 COLL VENOUS BLD VENIPUNCTURE: CPT

## 2020-02-29 PROCEDURE — 6360000002 HC RX W HCPCS: Performed by: INTERNAL MEDICINE

## 2020-02-29 PROCEDURE — 6370000000 HC RX 637 (ALT 250 FOR IP): Performed by: INTERNAL MEDICINE

## 2020-02-29 PROCEDURE — 6370000000 HC RX 637 (ALT 250 FOR IP): Performed by: FAMILY MEDICINE

## 2020-02-29 PROCEDURE — 85025 COMPLETE CBC W/AUTO DIFF WBC: CPT

## 2020-02-29 PROCEDURE — 6360000002 HC RX W HCPCS: Performed by: FAMILY MEDICINE

## 2020-02-29 PROCEDURE — 2580000003 HC RX 258: Performed by: INTERNAL MEDICINE

## 2020-02-29 PROCEDURE — 1200000000 HC SEMI PRIVATE

## 2020-02-29 RX ORDER — ONDANSETRON 2 MG/ML
4 INJECTION INTRAMUSCULAR; INTRAVENOUS EVERY 6 HOURS PRN
Status: DISCONTINUED | OUTPATIENT
Start: 2020-02-29 | End: 2020-03-05 | Stop reason: HOSPADM

## 2020-02-29 RX ORDER — HYDROCODONE BITARTRATE AND ACETAMINOPHEN 5; 325 MG/1; MG/1
1 TABLET ORAL EVERY 4 HOURS PRN
Status: DISCONTINUED | OUTPATIENT
Start: 2020-02-29 | End: 2020-03-05 | Stop reason: HOSPADM

## 2020-02-29 RX ORDER — TRAZODONE HYDROCHLORIDE 100 MG/1
100 TABLET ORAL NIGHTLY
Status: DISCONTINUED | OUTPATIENT
Start: 2020-02-29 | End: 2020-03-05 | Stop reason: HOSPADM

## 2020-02-29 RX ADMIN — HEPARIN SODIUM 5000 UNITS: 5000 INJECTION INTRAVENOUS; SUBCUTANEOUS at 14:30

## 2020-02-29 RX ADMIN — VANCOMYCIN HYDROCHLORIDE 1500 MG: 10 INJECTION, POWDER, LYOPHILIZED, FOR SOLUTION INTRAVENOUS at 03:40

## 2020-02-29 RX ADMIN — HYDROCODONE BITARTRATE AND ACETAMINOPHEN 1 TABLET: 5; 325 TABLET ORAL at 15:27

## 2020-02-29 RX ADMIN — ACETAMINOPHEN 650 MG: 325 TABLET ORAL at 08:34

## 2020-02-29 RX ADMIN — HEPARIN SODIUM 5000 UNITS: 5000 INJECTION INTRAVENOUS; SUBCUTANEOUS at 22:35

## 2020-02-29 RX ADMIN — CEFEPIME 2 G: 2 INJECTION, POWDER, FOR SOLUTION INTRAMUSCULAR; INTRAVENOUS at 15:28

## 2020-02-29 RX ADMIN — ONDANSETRON 4 MG: 2 INJECTION INTRAMUSCULAR; INTRAVENOUS at 15:27

## 2020-02-29 RX ADMIN — VANCOMYCIN HYDROCHLORIDE 1500 MG: 10 INJECTION, POWDER, LYOPHILIZED, FOR SOLUTION INTRAVENOUS at 17:23

## 2020-02-29 RX ADMIN — MODAFINIL 200 MG: 200 TABLET ORAL at 14:30

## 2020-02-29 RX ADMIN — HEPARIN SODIUM 5000 UNITS: 5000 INJECTION INTRAVENOUS; SUBCUTANEOUS at 06:08

## 2020-02-29 RX ADMIN — TRAZODONE HYDROCHLORIDE 100 MG: 100 TABLET ORAL at 22:35

## 2020-02-29 RX ADMIN — HYDROCODONE BITARTRATE AND ACETAMINOPHEN 1 TABLET: 5; 325 TABLET ORAL at 22:35

## 2020-02-29 ASSESSMENT — PAIN DESCRIPTION - ORIENTATION
ORIENTATION: RIGHT
ORIENTATION: RIGHT

## 2020-02-29 ASSESSMENT — PAIN DESCRIPTION - FREQUENCY
FREQUENCY: CONTINUOUS
FREQUENCY: CONTINUOUS

## 2020-02-29 ASSESSMENT — PAIN DESCRIPTION - DESCRIPTORS
DESCRIPTORS: ACHING
DESCRIPTORS: ACHING

## 2020-02-29 ASSESSMENT — PAIN DESCRIPTION - PROGRESSION
CLINICAL_PROGRESSION: NOT CHANGED
CLINICAL_PROGRESSION: NOT CHANGED

## 2020-02-29 ASSESSMENT — PAIN DESCRIPTION - LOCATION
LOCATION: ARM
LOCATION: ARM

## 2020-02-29 ASSESSMENT — PAIN SCALES - GENERAL
PAINLEVEL_OUTOF10: 7
PAINLEVEL_OUTOF10: 7
PAINLEVEL_OUTOF10: 0
PAINLEVEL_OUTOF10: 7

## 2020-02-29 ASSESSMENT — PAIN DESCRIPTION - PAIN TYPE
TYPE: ACUTE PAIN
TYPE: ACUTE PAIN

## 2020-02-29 ASSESSMENT — PAIN - FUNCTIONAL ASSESSMENT: PAIN_FUNCTIONAL_ASSESSMENT: PREVENTS OR INTERFERES SOME ACTIVE ACTIVITIES AND ADLS

## 2020-02-29 ASSESSMENT — PAIN DESCRIPTION - ONSET
ONSET: ON-GOING
ONSET: ON-GOING

## 2020-02-29 NOTE — PROGRESS NOTES
Nephrology Progress Note    Admit Date: 2/26/2020  Day: 3  Diet: DIET LOW SODIUM 2 GM;    CC: No chief complaint on file. Interval history:     Good UO   Cr better   Medications:     Scheduled Meds:   vancomycin  1,500 mg Intravenous Q18H    modafinil  200 mg Oral Daily    heparin (porcine)  5,000 Units Subcutaneous 3 times per day    budesonide  0.5 mg Nebulization BID    And    Arformoterol Tartrate  15 mcg Nebulization BID    cefepime  2 g Intravenous Q12H    sodium chloride flush  10 mL Intravenous 2 times per day    vancomycin (VANCOCIN) intermittent dosing (placeholder)   Other See Admin Instructions     Continuous Infusions:   sodium chloride 150 mL/hr at 02/28/20 2353     PRN Meds:albuterol, sodium chloride flush, acetaminophen, acetaminophen    Objective:   Vitals:   T-max:  Patient Vitals for the past 8 hrs:   BP Temp Temp src Pulse Resp SpO2   02/29/20 0834 131/78 97.3 °F (36.3 °C) Oral 72 18 95 %   02/29/20 0339 129/84 98 °F (36.7 °C) Oral 72 18 95 %       Intake/Output Summary (Last 24 hours) at 2/29/2020 1013  Last data filed at 2/29/2020 6885  Gross per 24 hour   Intake 1137 ml   Output 1575 ml   Net -438 ml       Physical Exam  Vitals signs and nursing note reviewed. Exam conducted with a chaperone present. Constitutional:       Comments: Laying in bed, no apparent distress    HENT:      Head: Atraumatic. Eyes:      Conjunctiva/sclera: Conjunctivae normal.      Pupils: Pupils are equal, round, and reactive to light. Neck:      Musculoskeletal: Neck supple. Cardiovascular:      Rate and Rhythm: Normal rate and regular rhythm. Heart sounds: Normal heart sounds. No murmur. Pulmonary:      Effort: Pulmonary effort is normal.      Breath sounds: Normal breath sounds. No wheezing. Abdominal:      General: Bowel sounds are normal.      Palpations: Abdomen is soft. Tenderness: There is no abdominal tenderness. Musculoskeletal:         General: Swelling present. Comments: Erythema, swelling in RUE. Warm to touch   Skin:     General: Skin is warm and dry. Capillary Refill: Capillary refill takes less than 2 seconds. Neurological:      General: No focal deficit present. Mental Status: She is alert and oriented to person, place, and time. Cranial Nerves: No cranial nerve deficit. LABS:    CBC:   Recent Labs     02/27/20 0622 02/28/20 0526 02/29/20 0617   WBC 15.6* 16.1* 9.6   HGB 10.7* 11.1* 10.0*   HCT 32.4* 34.0* 29.9*    66* 193   MCV 88.7 89.5 88.0     Renal:    Recent Labs     02/27/20 0622 02/28/20 0526 02/29/20 0617    139 141   K 4.4 4.2 4.0    107 107   CO2 20* 20* 22   BUN 44* 24* 18   CREATININE 2.9* 1.4* 1.0   GLUCOSE 97 99 88   CALCIUM 8.9 9.3 9.3   ANIONGAP 13 12 12     Hepatic: No results for input(s): AST, ALT, BILITOT, BILIDIR, PROT, LABALBU, ALKPHOS in the last 72 hours. Troponin: No results for input(s): TROPONINI in the last 72 hours. BNP: No results for input(s): BNP in the last 72 hours. Lipids: No results for input(s): CHOL, HDL in the last 72 hours. Invalid input(s): LDLCALCU, TRIGLYCERIDE  ABGs:  No results for input(s): PHART, CSD6VXL, PO2ART, GTE0VUO, BEART, THGBART, S2WAINOV, BQP9SZB in the last 72 hours. INR: No results for input(s): INR in the last 72 hours. Lactate: No results for input(s): LACTATE in the last 72 hours. Cultures:  -----------------------------------------------------------------  RAD:   VL Extremity Venous Right   Final Result      CT HUMERUS RIGHT WO CONTRAST   Final Result      Extensive subcutis space edema/inflammation with overlying skin thickening. No abscess identified            Assessment/Plan: KENAN  Creatinine 3.7 on admission. Improved with IVF with good urine output. Still above prior baseline.  Likely prerenal KENAN.  - d/c IVF   -Avoid nephrotoxins  -D/c bactrim    RUE cellulitis  -On IV vanc and cefepime  -Continue vanc but monitor levels

## 2020-02-29 NOTE — PROGRESS NOTES
Clinical Pharmacy Consult Note    Admit date: 2/26/2020     Interval Update: Renal function continues to improve. (SCr 4.6-> 3.7-> 2.9-> 1.4-->1.0. Subjective/Objective:  62 yof admitted for treatment of sepsis and RUE cellulitis. Patient was recently treated at [facility] ED on 2/24 for possible cellulitis of R posterior arm with Bactrim/Keflex. On 2/26 patient presented to [facility] ED with fever and confusion. CT head unremarkable, but Xray chest revealed possible pneumonia. SCr also significantly elevated at 4.6 (CrCl 16 mL/min). At that time patient received doses of cefepime 2g and vancomycin 1.5g. PMHx significant for provoked DVT (not on [facility]), MS on Peginterferon, HTN, HLD, h/o MRSA (2013). KENAN thought 2/2 poor intake and infection per nephrology. Pharmacy is consulted to dose Vancomycin per Dr. Alfreda Sherman    Vancomycin history: Patient has received vancomycin 1.5g IV q12h in 2013. Trough resulted at 14.5 mcg/mL. At that time weight was 115 kg and SCr ~1.1 mg/dL. Pertinent Medications:    Cefepime 2g IV q8h (2/27- current) - day #4  Vancomycin, pharmacy to dose  -- day #4   Intermittent dosing    1500 mg IV x1 (2/26 @ 1254 @ [facility])   1500 mg IV x1 2/27 @ 1318   1500 mg IV q18h (2/28- 2/29)   1500 mg IV q12h (2/29 - current)       Recent Labs     02/28/20  0526 02/29/20  0617    141   K 4.2 4.0    107   CO2 20* 22   BUN 24* 18   CREATININE 1.4* 1.0       Estimated Creatinine Clearance: 75 mL/min (based on SCr of 1 mg/dL).      Recent Labs     02/28/20  0526 02/29/20  0617   WBC 16.1* 9.6   HGB 11.1* 10.0*   HCT 34.0* 29.9*   MCV 89.5 88.0   PLT 66* 193       Height:  5' 4\" (162.6 cm)  Weight: 236 lb (107 kg)    IBW: 54.7 kg    Adjusted: 75.62 kg  BMI: 40.6      Micro:  Date Site Micro Susceptibility   2/26 Blood x2 No growth to date              Vancomycin doses/ levels:  Date Time Dose Level   2/26 1254 1500 mg ( [facility])    2/27 0622  10.0 mcg/mL   2/27 1318 1500 mg     2/28 0526  10.3 mcg/mL Assessment/Plan:  1. Sepsis/ RUE cellulitis  :  vancomycin + cefepime  -  day #4  Vancomycin - pharmacy to dose  · Renal function continues improving. SCr 1.0 today. Baseline 0.8. · Will increase Vancomycin to 1.5g IV q12h now that KENAN is resolving. · Clinical condition will be monitored closely, and levels will be ordered & dose adjustments made as appropriate. Cefepime  · Dose increased today to 2g IV q8h given improvement in CrCl > 60mL/min. · Renal function will be monitored closely and dosing will be adjusted as appropriate.     Please call with questions--  Thanks--  Fany Mcguire, PharmD, [facility], [address] [facility] or [phone] ([place])  2/29/2020 1:59 PM

## 2020-02-29 NOTE — PROGRESS NOTES
Hospitalist Progress Note      PCP: Triston Perez DO    Date of Admission: 2/26/2020      Subjective:   Pt. Still feels pain in R arm. She has been putting ice packs on it. She has been afebrile. Gupta catheter still in place. Medications:  Reviewed    Infusion Medications    sodium chloride 150 mL/hr at 02/28/20 0093     Scheduled Medications    vancomycin  1,500 mg Intravenous Q18H    modafinil  200 mg Oral Daily    heparin (porcine)  5,000 Units Subcutaneous 3 times per day    budesonide  0.5 mg Nebulization BID    And    Arformoterol Tartrate  15 mcg Nebulization BID    cefepime  2 g Intravenous Q12H    sodium chloride flush  10 mL Intravenous 2 times per day    vancomycin (VANCOCIN) intermittent dosing (placeholder)   Other See Admin Instructions     PRN Meds: albuterol, sodium chloride flush, acetaminophen, acetaminophen      Intake/Output Summary (Last 24 hours) at 2/29/2020 1112  Last data filed at 2/29/2020 0339  Gross per 24 hour   Intake 1137 ml   Output 1150 ml   Net -13 ml       Physical Exam Performed:    /78   Pulse 72   Temp 97.3 °F (36.3 °C) (Oral)   Resp 18   Ht 5' 4\" (1.626 m)   Wt 236 lb (107 kg)   SpO2 95%   BMI 40.51 kg/m²     General appearance: NAD  HEENT: PERRL  Neck: Supple, with full range of motion. No jugular venous distention. Trachea midline. Respiratory:  Normal respiratory effort. Clear to auscultation, bilaterally without Rales/Wheezes/Rhonchi. Cardiovascular: Regular rate and rhythm with normal S1/S2 without murmurs, rubs or gallops. Abdomen: Soft, non-tender, non-distended with normal bowel sounds. Musculoskeletal: No clubbing, cyanosis or edema bilaterally. Full range of motion without deformity.   Skin: R upper arm - +erythema, tenderness to palpation, some blistering, some mild clearing.+ edema    Labs:   Recent Labs     02/27/20  0622 02/28/20  0526 02/29/20  0617   WBC 15.6* 16.1* 9.6   HGB 10.7* 11.1* 10.0*   HCT 32.4* 34.0* 29.9*   PLT

## 2020-02-29 NOTE — PROGRESS NOTES
Patient alert and oriented with intermittent periods of confusion. Vital signs stable. Cellulitis to R arm remains reddened, warm and edematous. Patient reports discomfort and itching this shift. Arm propped on pillow and patient given ice pack. IV fluids infusing per order    Patient up to chair this shift and worked with PT/OT. x1 assist with GB and rolling walker - tolerating ambulation well. Patient tolerating PO fluids and meals and denies N/V but has little appetite. All fall precautions in place. Will continue to monitor.

## 2020-02-29 NOTE — PROGRESS NOTES
Pt said that she is not allergic to codeine, it just sometimes makes her nauseated or itchy. It is just side effects, not an allergy.

## 2020-03-01 LAB
ANION GAP SERPL CALCULATED.3IONS-SCNC: 16 MMOL/L (ref 3–16)
BANDED NEUTROPHILS RELATIVE PERCENT: 5 % (ref 0–7)
BASOPHILS ABSOLUTE: 0.1 K/UL (ref 0–0.2)
BASOPHILS RELATIVE PERCENT: 1 %
BLOOD CULTURE, ROUTINE: NORMAL
BUN BLDV-MCNC: 12 MG/DL (ref 7–20)
CALCIUM SERPL-MCNC: 9.4 MG/DL (ref 8.3–10.6)
CHLORIDE BLD-SCNC: 101 MMOL/L (ref 99–110)
CO2: 22 MMOL/L (ref 21–32)
CREAT SERPL-MCNC: 0.9 MG/DL (ref 0.6–1.1)
CULTURE, BLOOD 2: NORMAL
EOSINOPHILS ABSOLUTE: 0.1 K/UL (ref 0–0.6)
EOSINOPHILS RELATIVE PERCENT: 1 %
GFR AFRICAN AMERICAN: >60
GFR NON-AFRICAN AMERICAN: >60
GLUCOSE BLD-MCNC: 102 MG/DL (ref 70–99)
HCT VFR BLD CALC: 33.3 % (ref 36–48)
HEMOGLOBIN: 11 G/DL (ref 12–16)
LYMPHOCYTES ABSOLUTE: 1.7 K/UL (ref 1–5.1)
LYMPHOCYTES RELATIVE PERCENT: 17 %
MAGNESIUM: 1.5 MG/DL (ref 1.8–2.4)
MCH RBC QN AUTO: 29 PG (ref 26–34)
MCHC RBC AUTO-ENTMCNC: 32.9 G/DL (ref 31–36)
MCV RBC AUTO: 88 FL (ref 80–100)
METAMYELOCYTES RELATIVE PERCENT: 1 %
MONOCYTES ABSOLUTE: 1.5 K/UL (ref 0–1.3)
MONOCYTES RELATIVE PERCENT: 15 %
NEUTROPHILS ABSOLUTE: 6.6 K/UL (ref 1.7–7.7)
NEUTROPHILS RELATIVE PERCENT: 60 %
PDW BLD-RTO: 14.5 % (ref 12.4–15.4)
PLATELET # BLD: 211 K/UL (ref 135–450)
PMV BLD AUTO: 7.9 FL (ref 5–10.5)
POTASSIUM REFLEX MAGNESIUM: 3 MMOL/L (ref 3.5–5.1)
RBC # BLD: 3.78 M/UL (ref 4–5.2)
SODIUM BLD-SCNC: 139 MMOL/L (ref 136–145)
WBC # BLD: 10 K/UL (ref 4–11)

## 2020-03-01 PROCEDURE — 1200000000 HC SEMI PRIVATE

## 2020-03-01 PROCEDURE — 80048 BASIC METABOLIC PNL TOTAL CA: CPT

## 2020-03-01 PROCEDURE — 94761 N-INVAS EAR/PLS OXIMETRY MLT: CPT

## 2020-03-01 PROCEDURE — 6370000000 HC RX 637 (ALT 250 FOR IP): Performed by: INTERNAL MEDICINE

## 2020-03-01 PROCEDURE — 85025 COMPLETE CBC W/AUTO DIFF WBC: CPT

## 2020-03-01 PROCEDURE — 6360000002 HC RX W HCPCS: Performed by: INTERNAL MEDICINE

## 2020-03-01 PROCEDURE — 2580000003 HC RX 258: Performed by: INTERNAL MEDICINE

## 2020-03-01 PROCEDURE — 94640 AIRWAY INHALATION TREATMENT: CPT

## 2020-03-01 PROCEDURE — 36415 COLL VENOUS BLD VENIPUNCTURE: CPT

## 2020-03-01 PROCEDURE — 2580000003 HC RX 258

## 2020-03-01 PROCEDURE — 83735 ASSAY OF MAGNESIUM: CPT

## 2020-03-01 PROCEDURE — 6370000000 HC RX 637 (ALT 250 FOR IP): Performed by: FAMILY MEDICINE

## 2020-03-01 RX ORDER — LANOLIN ALCOHOL/MO/W.PET/CERES
400 CREAM (GRAM) TOPICAL DAILY
Status: DISCONTINUED | OUTPATIENT
Start: 2020-03-01 | End: 2020-03-05 | Stop reason: HOSPADM

## 2020-03-01 RX ORDER — POTASSIUM CHLORIDE 20 MEQ/1
20 TABLET, EXTENDED RELEASE ORAL 2 TIMES DAILY WITH MEALS
Status: DISCONTINUED | OUTPATIENT
Start: 2020-03-01 | End: 2020-03-04

## 2020-03-01 RX ORDER — SODIUM CHLORIDE 9 MG/ML
INJECTION, SOLUTION INTRAVENOUS
Status: COMPLETED
Start: 2020-03-01 | End: 2020-03-01

## 2020-03-01 RX ADMIN — Medication 10 ML: at 09:46

## 2020-03-01 RX ADMIN — BUDESONIDE 500 MCG: 0.5 SUSPENSION RESPIRATORY (INHALATION) at 22:57

## 2020-03-01 RX ADMIN — BUDESONIDE 500 MCG: 0.5 SUSPENSION RESPIRATORY (INHALATION) at 09:05

## 2020-03-01 RX ADMIN — SODIUM CHLORIDE 500 ML: 9 INJECTION, SOLUTION INTRAVENOUS at 09:46

## 2020-03-01 RX ADMIN — ARFORMOTEROL TARTRATE 15 MCG: 15 SOLUTION RESPIRATORY (INHALATION) at 09:05

## 2020-03-01 RX ADMIN — Medication 400 MG: at 11:34

## 2020-03-01 RX ADMIN — Medication 10 ML: at 19:48

## 2020-03-01 RX ADMIN — ARFORMOTEROL TARTRATE 15 MCG: 15 SOLUTION RESPIRATORY (INHALATION) at 22:48

## 2020-03-01 RX ADMIN — HEPARIN SODIUM 5000 UNITS: 5000 INJECTION INTRAVENOUS; SUBCUTANEOUS at 06:20

## 2020-03-01 RX ADMIN — VANCOMYCIN HYDROCHLORIDE 1500 MG: 10 INJECTION, POWDER, LYOPHILIZED, FOR SOLUTION INTRAVENOUS at 19:48

## 2020-03-01 RX ADMIN — ACETAMINOPHEN 650 MG: 325 TABLET ORAL at 11:29

## 2020-03-01 RX ADMIN — VANCOMYCIN HYDROCHLORIDE 1500 MG: 10 INJECTION, POWDER, LYOPHILIZED, FOR SOLUTION INTRAVENOUS at 11:30

## 2020-03-01 RX ADMIN — HEPARIN SODIUM 5000 UNITS: 5000 INJECTION INTRAVENOUS; SUBCUTANEOUS at 22:10

## 2020-03-01 RX ADMIN — HEPARIN SODIUM 5000 UNITS: 5000 INJECTION INTRAVENOUS; SUBCUTANEOUS at 14:33

## 2020-03-01 RX ADMIN — CEFEPIME 2 G: 2 INJECTION, POWDER, FOR SOLUTION INTRAMUSCULAR; INTRAVENOUS at 09:42

## 2020-03-01 RX ADMIN — HYDROCODONE BITARTRATE AND ACETAMINOPHEN 1 TABLET: 5; 325 TABLET ORAL at 19:48

## 2020-03-01 RX ADMIN — TRAZODONE HYDROCHLORIDE 100 MG: 100 TABLET ORAL at 19:48

## 2020-03-01 RX ADMIN — HYDROCODONE BITARTRATE AND ACETAMINOPHEN 1 TABLET: 5; 325 TABLET ORAL at 02:54

## 2020-03-01 RX ADMIN — CEFEPIME 2 G: 2 INJECTION, POWDER, FOR SOLUTION INTRAMUSCULAR; INTRAVENOUS at 17:30

## 2020-03-01 RX ADMIN — POTASSIUM CHLORIDE 20 MEQ: 20 TABLET, EXTENDED RELEASE ORAL at 17:30

## 2020-03-01 RX ADMIN — MODAFINIL 200 MG: 200 TABLET ORAL at 14:33

## 2020-03-01 RX ADMIN — POTASSIUM CHLORIDE 20 MEQ: 20 TABLET, EXTENDED RELEASE ORAL at 11:34

## 2020-03-01 ASSESSMENT — PAIN DESCRIPTION - LOCATION
LOCATION: ARM

## 2020-03-01 ASSESSMENT — PAIN SCALES - GENERAL
PAINLEVEL_OUTOF10: 3
PAINLEVEL_OUTOF10: 6
PAINLEVEL_OUTOF10: 7
PAINLEVEL_OUTOF10: 3
PAINLEVEL_OUTOF10: 3

## 2020-03-01 ASSESSMENT — PAIN DESCRIPTION - PAIN TYPE
TYPE: ACUTE PAIN

## 2020-03-01 ASSESSMENT — PAIN DESCRIPTION - ORIENTATION
ORIENTATION: RIGHT

## 2020-03-01 ASSESSMENT — PAIN DESCRIPTION - DESCRIPTORS
DESCRIPTORS: ACHING
DESCRIPTORS: ACHING

## 2020-03-01 ASSESSMENT — PAIN DESCRIPTION - FREQUENCY
FREQUENCY: CONTINUOUS
FREQUENCY: CONTINUOUS

## 2020-03-01 ASSESSMENT — PAIN DESCRIPTION - PROGRESSION
CLINICAL_PROGRESSION: NOT CHANGED
CLINICAL_PROGRESSION: NOT CHANGED

## 2020-03-01 ASSESSMENT — PAIN DESCRIPTION - ONSET
ONSET: ON-GOING
ONSET: ON-GOING

## 2020-03-01 NOTE — PROGRESS NOTES
Clinical Pharmacy Consult Note    Admit date: 2/26/2020     Interval Update: Renal function continues to improve. (SCr 4.6-> 3.7-> 2.9-> 1.4-->1.0-->0.9. Pt lost IV access early this morning which delayed vancomycin therapy. Subjective/Objective:  62 yof admitted for treatment of sepsis and RUE cellulitis. Patient was recently treated at [facility] ED on 2/24 for possible cellulitis of R posterior arm with Bactrim/Keflex. On 2/26 patient presented to [facility] ED with fever and confusion. CT head unremarkable, but Xray chest revealed possible pneumonia. SCr also significantly elevated at 4.6 (CrCl 16 mL/min). At that time patient received doses of cefepime 2g and vancomycin 1.5g. PMHx significant for provoked DVT (not on [facility]), MS on Peginterferon, HTN, HLD, h/o MRSA (2013). KENAN thought 2/2 poor intake and infection per nephrology. Pharmacy is consulted to dose Vancomycin per Dr. Alexander Monge    Vancomycin history: Patient has received vancomycin 1.5g IV q12h in 2013. Trough resulted at 14.5 mcg/mL. At that time weight was 115 kg and SCr ~1.1 mg/dL. Pertinent Medications:    Cefepime 2g IV q8h (2/27- current) - day #5  Vancomycin, pharmacy to dose  -- day #5   Intermittent dosing    1500 mg IV x1 (2/26 @ 1254 @ [facility])   1500 mg IV x1 2/27 @ 1318   1500 mg IV q18h (2/28- 2/29)   1500 mg IV q12h (2/29 - current)       Recent Labs     02/29/20  0617 03/01/20  0652    139   K 4.0 3.0*    101   CO2 22 22   BUN 18 12   CREATININE 1.0 0.9       Estimated Creatinine Clearance: 83 mL/min (based on SCr of 0.9 mg/dL).      Recent Labs     02/29/20  0617 03/01/20  0653   WBC 9.6 10.0   HGB 10.0* 11.0*   HCT 29.9* 33.3*   MCV 88.0 88.0    211       Height:  5' 4\" (162.6 cm)  Weight: 236 lb (107 kg)    IBW: 54.7 kg    Adjusted: 75.62 kg  BMI: 40.6      Micro:  Date Site Micro Susceptibility   2/26 Blood x2 No growth to date              Vancomycin doses/ levels:  Date Time Dose Level   2/26 1254 1500 mg ( [facility])    2/27 0622  10.0 mcg/mL   2/27 1318 1500 mg     2/28 0526  10.3 mcg/mL           Assessment/Plan:  1. Sepsis/ RUE cellulitis  :  vancomycin + cefepime  -  day #5  Vancomycin - pharmacy to dose  · Renal function continues improving. SCr 0.9 today. Baseline 0.8. · Vancomycin was increased yesterday to 1.5g IV q12h now that KENAN is resolving. · Pt lost IV access this morning which delayed vancomycin by 6 hours. Pt received most recent dose of vancomycin ~18 hours after last dose (ordered to be q12h). · Will order a trough for tomorrow morning 30 minutes prior to 11:30 dose of vancomycin. · Clinical condition will be monitored closely, and levels will be ordered & dose adjustments made as appropriate. Cefepime  · Dose increased today to 2g IV q8h given improvement in CrCl > 60mL/min. · Renal function will be monitored closely and dosing will be adjusted as appropriate.     Please call with questions--  Thanks--  Devonte Mendez PharmD  PGY1 Pharmacy Resident   Wireless: [phone]  3/1/2020 1:27 PM

## 2020-03-01 NOTE — PROGRESS NOTES
Hospitalist Progress Note      PCP: [id number],     Date of Admission: 2/26/2020    Hospital Course:  Pt. Is a 63 yo F with PmHx Multiple Sclerosis who was admitted for sepsis, RUE cellulitis. She has been on Vancomycin and Cefepime. Somewhat slow to improve. Repeat CT RUE did not show any bursitis. Subjective:   Pt.'s IV came out yesterday, and she states she was \"stuck\" multiple times until they finally got an IV. Currently she feels like she has a \"fever. \" She feels the R arm is about the same. Medications:  Reviewed    Infusion Medications    sodium chloride 150 mL/hr at 02/28/20 2353     Scheduled Medications    magnesium oxide  400 mg Oral Daily    potassium chloride  20 mEq Oral BID WC    traZODone  100 mg Oral Nightly    cefepime  2 g Intravenous Q8H    vancomycin  1,500 mg Intravenous Q12H    modafinil  200 mg Oral Daily    heparin (porcine)  5,000 Units Subcutaneous 3 times per day    budesonide  0.5 mg Nebulization BID    And    Arformoterol Tartrate  15 mcg Nebulization BID    sodium chloride flush  10 mL Intravenous 2 times per day     PRN Meds: HYDROcodone 5 mg - acetaminophen, ondansetron, albuterol, sodium chloride flush, acetaminophen, acetaminophen      Intake/Output Summary (Last 24 hours) at 3/1/2020 1358  Last data filed at 3/1/2020 0300  Gross per 24 hour   Intake 360 ml   Output 500 ml   Net -140 ml       Physical Exam Performed:    /78   Pulse 77   Temp 98.4 °F (36.9 °C) (Oral)   Resp 18   Ht 5' 4\" (1.626 m)   Wt 236 lb (107 kg)   SpO2 94%   BMI 40.51 kg/m²     General appearance: NAD  HEENT: PERRL  Neck: Supple, with full range of motion. No jugular venous distention. Trachea midline. Respiratory:  Normal respiratory effort. Clear to auscultation, bilaterally without Rales/Wheezes/Rhonchi. Cardiovascular: Regular rate and rhythm with normal S1/S2 without murmurs, rubs or gallops.   Abdomen: Soft, non-tender, non-distended with normal bowel

## 2020-03-01 NOTE — PLAN OF CARE
Problem: Falls - Risk of:  Goal: Will remain free from falls  Description  Will remain free from falls  Note:   Pt free from injury or falls at this time, fall precautions in place, bed in low position, side rail up x2, Harrell Fall Risk: High (45 and higher), bed alarm on, reoriented to room and call light, reminded not to get up without assistance, call light in reach, will continue to monitor. Pt verbalizes understanding of fall risk procedures. Problem: Risk for Impaired Skin Integrity  Goal: Tissue integrity - skin and mucous membranes  Description  Structural intactness and normal physiological function of skin and  mucous membranes.   Note:   Right arm red and swollen, vitals stable, attempting to get IV access     Problem: Confusion - Acute:  Goal: Absence of continued neurological deterioration signs and symptoms  Description  Absence of continued neurological deterioration signs and symptoms  Note:   Patient alert and oriented, no further episodes of confusion

## 2020-03-01 NOTE — PROGRESS NOTES
IV attempted by 3 different nurses including ER nurse using vein finder for a total of 6 attempts, hospitalist notified of inability to get IV access

## 2020-03-02 LAB
ANION GAP SERPL CALCULATED.3IONS-SCNC: 14 MMOL/L (ref 3–16)
BASOPHILS ABSOLUTE: 0 K/UL (ref 0–0.2)
BASOPHILS RELATIVE PERCENT: 0 %
BUN BLDV-MCNC: 9 MG/DL (ref 7–20)
CALCIUM SERPL-MCNC: 9.6 MG/DL (ref 8.3–10.6)
CHLORIDE BLD-SCNC: 102 MMOL/L (ref 99–110)
CO2: 22 MMOL/L (ref 21–32)
CREAT SERPL-MCNC: 0.8 MG/DL (ref 0.6–1.1)
EOSINOPHILS ABSOLUTE: 0 K/UL (ref 0–0.6)
EOSINOPHILS RELATIVE PERCENT: 0 %
GFR AFRICAN AMERICAN: >60
GFR NON-AFRICAN AMERICAN: >60
GLUCOSE BLD-MCNC: 96 MG/DL (ref 70–99)
HCT VFR BLD CALC: 30.9 % (ref 36–48)
HEMOGLOBIN: 10.3 G/DL (ref 12–16)
LYMPHOCYTES ABSOLUTE: 2.5 K/UL (ref 1–5.1)
LYMPHOCYTES RELATIVE PERCENT: 21 %
MCH RBC QN AUTO: 29.2 PG (ref 26–34)
MCHC RBC AUTO-ENTMCNC: 33.2 G/DL (ref 31–36)
MCV RBC AUTO: 87.9 FL (ref 80–100)
MONOCYTES ABSOLUTE: 0.6 K/UL (ref 0–1.3)
MONOCYTES RELATIVE PERCENT: 5 %
NEUTROPHILS ABSOLUTE: 9 K/UL (ref 1.7–7.7)
NEUTROPHILS RELATIVE PERCENT: 74 %
PDW BLD-RTO: 14.6 % (ref 12.4–15.4)
PLATELET # BLD: 205 K/UL (ref 135–450)
PMV BLD AUTO: 7.6 FL (ref 5–10.5)
POTASSIUM REFLEX MAGNESIUM: 3.8 MMOL/L (ref 3.5–5.1)
RBC # BLD: 3.52 M/UL (ref 4–5.2)
SODIUM BLD-SCNC: 138 MMOL/L (ref 136–145)
VANCOMYCIN TROUGH: 16.8 UG/ML (ref 10–20)
VANCOMYCIN TROUGH: 30.1 UG/ML (ref 10–20)
WBC # BLD: 12.1 K/UL (ref 4–11)

## 2020-03-02 PROCEDURE — 85025 COMPLETE CBC W/AUTO DIFF WBC: CPT

## 2020-03-02 PROCEDURE — 97535 SELF CARE MNGMENT TRAINING: CPT

## 2020-03-02 PROCEDURE — 97530 THERAPEUTIC ACTIVITIES: CPT

## 2020-03-02 PROCEDURE — 6370000000 HC RX 637 (ALT 250 FOR IP): Performed by: FAMILY MEDICINE

## 2020-03-02 PROCEDURE — 6370000000 HC RX 637 (ALT 250 FOR IP): Performed by: INTERNAL MEDICINE

## 2020-03-02 PROCEDURE — 6360000002 HC RX W HCPCS: Performed by: INTERNAL MEDICINE

## 2020-03-02 PROCEDURE — 80202 ASSAY OF VANCOMYCIN: CPT

## 2020-03-02 PROCEDURE — 36415 COLL VENOUS BLD VENIPUNCTURE: CPT

## 2020-03-02 PROCEDURE — 80048 BASIC METABOLIC PNL TOTAL CA: CPT

## 2020-03-02 PROCEDURE — 94640 AIRWAY INHALATION TREATMENT: CPT

## 2020-03-02 PROCEDURE — 1200000000 HC SEMI PRIVATE

## 2020-03-02 PROCEDURE — 97110 THERAPEUTIC EXERCISES: CPT

## 2020-03-02 PROCEDURE — 2580000003 HC RX 258: Performed by: INTERNAL MEDICINE

## 2020-03-02 PROCEDURE — 97116 GAIT TRAINING THERAPY: CPT

## 2020-03-02 RX ADMIN — VANCOMYCIN HYDROCHLORIDE 1500 MG: 10 INJECTION, POWDER, LYOPHILIZED, FOR SOLUTION INTRAVENOUS at 23:09

## 2020-03-02 RX ADMIN — CEFEPIME 2 G: 2 INJECTION, POWDER, FOR SOLUTION INTRAMUSCULAR; INTRAVENOUS at 09:18

## 2020-03-02 RX ADMIN — CEFEPIME 2 G: 2 INJECTION, POWDER, FOR SOLUTION INTRAMUSCULAR; INTRAVENOUS at 02:16

## 2020-03-02 RX ADMIN — POTASSIUM CHLORIDE 20 MEQ: 20 TABLET, EXTENDED RELEASE ORAL at 08:48

## 2020-03-02 RX ADMIN — BUDESONIDE 500 MCG: 0.5 SUSPENSION RESPIRATORY (INHALATION) at 09:30

## 2020-03-02 RX ADMIN — ACETAMINOPHEN 650 MG: 325 TABLET ORAL at 15:01

## 2020-03-02 RX ADMIN — VANCOMYCIN HYDROCHLORIDE 1500 MG: 10 INJECTION, POWDER, LYOPHILIZED, FOR SOLUTION INTRAVENOUS at 10:09

## 2020-03-02 RX ADMIN — MODAFINIL 200 MG: 200 TABLET ORAL at 09:13

## 2020-03-02 RX ADMIN — Medication 400 MG: at 08:48

## 2020-03-02 RX ADMIN — ACETAMINOPHEN 650 MG: 325 TABLET ORAL at 09:02

## 2020-03-02 RX ADMIN — Medication 10 ML: at 10:18

## 2020-03-02 RX ADMIN — BUDESONIDE 500 MCG: 0.5 SUSPENSION RESPIRATORY (INHALATION) at 19:42

## 2020-03-02 RX ADMIN — ARFORMOTEROL TARTRATE 15 MCG: 15 SOLUTION RESPIRATORY (INHALATION) at 19:42

## 2020-03-02 RX ADMIN — HEPARIN SODIUM 5000 UNITS: 5000 INJECTION INTRAVENOUS; SUBCUTANEOUS at 15:02

## 2020-03-02 RX ADMIN — HEPARIN SODIUM 5000 UNITS: 5000 INJECTION INTRAVENOUS; SUBCUTANEOUS at 05:47

## 2020-03-02 RX ADMIN — Medication 10 ML: at 21:12

## 2020-03-02 RX ADMIN — TRAZODONE HYDROCHLORIDE 100 MG: 100 TABLET ORAL at 21:12

## 2020-03-02 RX ADMIN — POTASSIUM CHLORIDE 20 MEQ: 20 TABLET, EXTENDED RELEASE ORAL at 18:04

## 2020-03-02 RX ADMIN — CEFEPIME 2 G: 2 INJECTION, POWDER, FOR SOLUTION INTRAMUSCULAR; INTRAVENOUS at 17:45

## 2020-03-02 RX ADMIN — ARFORMOTEROL TARTRATE 15 MCG: 15 SOLUTION RESPIRATORY (INHALATION) at 09:29

## 2020-03-02 ASSESSMENT — PAIN DESCRIPTION - PAIN TYPE: TYPE: ACUTE PAIN

## 2020-03-02 ASSESSMENT — PAIN SCALES - GENERAL
PAINLEVEL_OUTOF10: 3
PAINLEVEL_OUTOF10: 3

## 2020-03-02 ASSESSMENT — PAIN DESCRIPTION - ORIENTATION: ORIENTATION: RIGHT

## 2020-03-02 ASSESSMENT — PAIN DESCRIPTION - LOCATION: LOCATION: ARM

## 2020-03-02 NOTE — PROGRESS NOTES
Patient states pain to right arm is 7/10, vitals remains stable, left AC IV site infusing well, patient up to bathroom with assist, assisted to position of comfort, patient encouraged to call with needs, call light in reach, items within reach, will continue to monitor

## 2020-03-02 NOTE — PROGRESS NOTES
Result      Extensive subcutis space edema/inflammation with overlying skin thickening. No abscess identified              Assessment/Plan:    Active Hospital Problems    Diagnosis Date Noted    Sepsis ([facility] [A41.9] 02/26/2020     #Sepsis secondary to right upper extremity cellulitis. Repeat CT showed no abscess formation. Patient is still spiking fever. Continue antibiotics with vancomycin and cefepime. #KENAN likely secondary to Bactrim. Continue IV fluid. Nephrology following. #Multiple sclerosis with possible movement disorder. Follow-up outpatient with neurology. #Acute metabolic encephalopathy resolved. DVT Prophylaxis: Heparin  Diet: DIET LOW SODIUM 2 GM;  Code Status: Full Code    PT/OT Eval Status: In progress    Dispo -patient is to be afebrile for 24 to 48 hours.     Trevor Kamara MD

## 2020-03-02 NOTE — PROGRESS NOTES
Insomnia, Migraine, MS (multiple sclerosis) ([facility] Multinodular goiter, Nausea & vomiting, Osteoarthritis, Polyp of colon, adenomatous, PONV (postoperative nausea and vomiting), Rash, Right leg DVT ([facility] Sleep apnea, Spondylolisthesis of lumbar region, Thyroid nodule, Ureteral stone, Ureterolithiasis, Urinary incontinence, Venous insufficiency, and Weakness of left arm. has a past surgical history that includes bladder suspension (1995); Hysterectomy (1995); Breast surgery (2000); Colonoscopy (3/13/2013); skin biopsy; Salpingo-oophorectomy (4/12/2013); other surgical history (Bilateral, 06/14/2013); Diagnostic Cardiac Cath Lab Procedure; and other surgical history (N/A, 08/11/2016). Restrictions  Restrictions/Precautions  Required Braces or Orthoses?: No  Position Activity Restriction  Other position/activity restrictions: up as tolerated  Subjective   General  Chart Reviewed: Yes  Additional Pertinent Hx: 62 yof admitted for treatment of sepsis. Patient was recently treated at [facility] ED on 2/24 for possible cellulitis of R posterior arm with Bactrim/Keflex. On 2/26 patient presented to [facility] ED with fever and confusion. CT head unremarkable, but Xray chest revealed possible pneumonia. Family / Caregiver Present: Yes()  Subjective  Subjective: Pt found sitting. Agreeable to PT.     Pain Screening  Patient Currently in Pain: Denies  Vital Signs  Patient Currently in Pain: Denies       Orientation     Cognition      Objective      Transfers  Sit to Stand: Contact guard assistance  Stand to sit: Contact guard assistance(cues for safety)  Ambulation  Ambulation?: Yes  Ambulation 1  Device: Rolling Walker  Assistance: Contact guard assistance  Quality of Gait: decreased dar, slightly unsteady - 1 LOB requiring min assist to recover  Distance: 100', 20'                                  G-Code     OutComes Score                                                     AM-PAC Score  AM-PAC Inpatient Mobility Raw Score : 18 (03/02/20 1527)  AM-PAC Inpatient T-Scale Score : 43.63 (03/02/20 1527)  Mobility Inpatient CMS 0-100% Score: 46.58 (03/02/20 1527)  Mobility Inpatient CMS G-Code Modifier : CK (03/02/20 1527)          Goals  Short term goals  Time Frame for Short term goals: d/c  Short term goal 1: sup<>sit independent . Ongoing  Short term goal 2: sit<>stand supervision with RW. Ongoing  Short term goal 3: amb 150' with RW and SBA. Ongoing  Patient Goals   Patient goals : return home when able    Plan    Plan  Times per week: 2-5  Times per day: Daily  Current Treatment Recommendations: Strengthening, ROM, Balance Training, Gait Training, Stair training, Functional Mobility Training, Transfer Training, Endurance Training, Home Exercise Program, Safety Education & Training  Safety Devices  Type of devices: Gait belt, Left in chair, Chair alarm in place, Call light within reach, Nurse notified     Therapy Time   Individual Concurrent Group Co-treatment   Time In 1518         Time Out 1541         Minutes 23              Timed Code Treatment Minutes:  23    Total Treatment Minutes:  23  If pt d/c'd prior to next treatment, this note serves as a discharge note.       Kaylah Regalado, PT

## 2020-03-02 NOTE — PROGRESS NOTES
Bilateral kidney stones, Cellulitis, lip, Cervical spinal stenosis, Cholelithiasis, Clotting disorder ([address] Crohn's disease ([address] DDD (degenerative disc disease), cervical, DDD (degenerative disc disease), lumbar, Depression, DVT, lower extremity ([address] Fibrocystic breast, GERD (gastroesophageal reflux disease), Hormone disorder, Hx MRSA infection, Hyperlipidemia, Hypertension, Insomnia, Migraine, MS (multiple sclerosis) ([address] Multinodular goiter, Nausea & vomiting, Osteoarthritis, Polyp of colon, adenomatous, PONV (postoperative nausea and vomiting), Rash, Right leg DVT ([address] Sleep apnea, Spondylolisthesis of lumbar region, Thyroid nodule, Ureteral stone, Ureterolithiasis, Urinary incontinence, Venous insufficiency, and Weakness of left arm. has a past surgical history that includes bladder suspension (1995); Hysterectomy (1995); Breast surgery (2000); Colonoscopy (3/13/2013); skin biopsy; Salpingo-oophorectomy (4/12/2013); other surgical history (Bilateral, 06/14/2013); Diagnostic Cardiac Cath Lab Procedure; and other surgical history (N/A, 08/11/2016). Restrictions  Restrictions/Precautions  Required Braces or Orthoses?: No  Position Activity Restriction  Other position/activity restrictions: up as tolerated  Subjective   General  Chart Reviewed: Yes  Additional Pertinent Hx: Pt presented 2/26 with AMS, R arm swelling, open cat bite L wrist. Admitted for sepsis related to R UE cellulitis, ARF. CT R humerus: Extensive subcutis space edema/inflammation with overlying skin thickening. No abscess identified; CT head (-), R UE dopplers (-). PMHx: Multiple sclerosis, insomnia, HTN, HLD, depression, DDD, asthma  Family / Caregiver Present: Yes()  Referring Practitioner: Maciej Boone MD  Diagnosis: sepsis  Subjective  Subjective: Pt asleeep in bed upon therapist arrival, easy to rouse and agreeable to OT tx session. Pt states that her body is aching 2/2 prolonged time laying in bed.   Vital

## 2020-03-02 NOTE — CARE COORDINATION
Case Management Assessment           Daily Note                 Date/ Time of Note: 3/2/2020 6:43 PM         Note completed by: Zoe Jarvis    Patient Name: Conor Mccormick  YOB: 1965    Diagnosis:Sepsis [facility]) [A41.9]  Patient Admission Status: Inpatient    Date of Admission:2/26/2020  4:56 PM Length of Stay: 5 GLOS:        Current Plan of Care: return to home with home care through [facility]  ________________________________________________________________________________________  PT AM-PAC: 18 / 24 per last evaluation on: 03/02    OT AM-PAC: 20 / 24 per last evaluation on: 03/02    DME Needs for discharge: rolling walker and shower chair    ________________________________________________________________________________________  Discharge Plan: Home with [address]: [facility]    Tentative discharge date: 03/03    Current barriers to discharge: needs to be afebrile    Referrals completed: [address]: [facility]    Resources/ information provided: Not indicated at this time  ________________________________________________________________________________________  Case Management Notes:Spoke with patient who is ready to discharge to home with her spouse. She would like home care and would like [facility]. She also asked about a rolling walker, shower chair and has some questions about grab bars. Referrals made with Michael from [place] and [facility] from [facility]. Hu Smith and her family were provided with choice of provider; she and her family are in agreement with the discharge plan.     Care Transition Patient: Olive Jarvis RN  [facility]  Case Management Department  Ph: [phone]  Fax: [phone]

## 2020-03-02 NOTE — PROGRESS NOTES
Nephrology Progress Note    Admit Date: 2/26/2020  Day: 5  Diet: DIET LOW SODIUM 2 GM;    CC: No chief complaint on file. Interval history:     Good UO   Cr better   Medications:     Scheduled Meds:   magnesium oxide  400 mg Oral Daily    potassium chloride  20 mEq Oral BID WC    traZODone  100 mg Oral Nightly    cefepime  2 g Intravenous Q8H    vancomycin  1,500 mg Intravenous Q12H    modafinil  200 mg Oral Daily    heparin (porcine)  5,000 Units Subcutaneous 3 times per day    budesonide  0.5 mg Nebulization BID    And    Arformoterol Tartrate  15 mcg Nebulization BID    sodium chloride flush  10 mL Intravenous 2 times per day     Continuous Infusions:    PRN Meds:HYDROcodone 5 mg - acetaminophen, ondansetron, albuterol, sodium chloride flush, acetaminophen, acetaminophen    Objective:   Vitals:   T-max:  Patient Vitals for the past 8 hrs:   BP Temp Temp src Pulse Resp SpO2   03/01/20 2223 (!) 143/83 100.1 °F (37.8 °C) Oral 105 18 96 %   03/01/20 1914 (!) 148/84 102.3 °F (39.1 °C) Oral 100 18 96 %       Intake/Output Summary (Last 24 hours) at 3/2/2020 0032  Last data filed at 3/1/2020 1846  Gross per 24 hour   Intake 900 ml   Output --   Net 900 ml       Physical Exam  Vitals signs and nursing note reviewed. Exam conducted with a chaperone present. Constitutional:       Comments: Laying in bed, no apparent distress    HENT:      Head: Atraumatic. Eyes:      Conjunctiva/sclera: Conjunctivae normal.      Pupils: Pupils are equal, round, and reactive to light. Neck:      Musculoskeletal: Neck supple. Cardiovascular:      Rate and Rhythm: Normal rate and regular rhythm. Heart sounds: Normal heart sounds. No murmur. Pulmonary:      Effort: Pulmonary effort is normal.      Breath sounds: Normal breath sounds. No wheezing. Abdominal:      General: Bowel sounds are normal.      Palpations: Abdomen is soft. Tenderness: There is no abdominal tenderness.    Musculoskeletal:         General: Extensive subcutis space edema/inflammation with overlying skin thickening. No abscess identified            Assessment/Plan: KENAN  Creatinine 3.7 on admission. Improved with IVF with good urine output. Still above prior baseline. Likely prerenal KENAN. - Cr better   - d/c IVF   -Avoid nephrotoxins  -D/c bactrim    RUE cellulitis  -On IV vanc and cefepime  -Continue vanc but monitor levels closely    Chronic HTN  Hypotension on presentation, improved with IVF. Continue to monitor.     Code Status: Full Code  FEN: DIET LOW SODIUM 2 GM;  PPX: SQH  DISPO: GMF    MD Dwayne Berry MD

## 2020-03-02 NOTE — PROGRESS NOTES
Clinical Pharmacy Consult Note    Admit date: 2/26/2020     Interval Update: Renal function continues to improve. (SCr 4.6-> 3.7-> 2.9-> 1.4-->1.0-->0.9. --> 0.8    Subjective/Objective:  62 yof admitted for treatment of sepsis and RUE cellulitis. Patient was recently treated at [facility] ED on 2/24 for possible cellulitis of R posterior arm with Bactrim/Keflex. On 2/26 patient presented to [facility] ED with fever and confusion. CT head unremarkable, but Xray chest revealed possible pneumonia. SCr also significantly elevated at 4.6 (CrCl 16 mL/min). At that time patient received doses of cefepime 2g and vancomycin 1.5g. PMHx significant [facility] MS on Peginterferon, HTN, HLD, h/o MRSA (2013). KENAN thought 2/2 poor intake and infection per nephrology. Pharmacy is consulted to dose Vancomycin per Dr. Margaret Zayas    Vancomycin history: Patient has received vancomycin 1.5g IV q12h in 2013. Trough resulted at 14.5 mcg/mL. At that time weight was 115 kg and SCr ~1.1 mg/dL. Pertinent Medications:    Cefepime 2g IV q8h (2/27- current) - day #6  Vancomycin, pharmacy to dose  -- day #6   Intermittent dosing    1500 mg IV x1 (2/26 @ 1254 @ [facility])   1500 mg IV x1 2/27 @ 1318   1500 mg IV q18h (2/28- 2/29)   1500 mg IV q12h (2/29 - current)       Recent Labs     03/01/20  0652 03/02/20  0542    138   K 3.0* 3.8    102   CO2 22 22   BUN 12 9   CREATININE 0.9 0.8       Estimated Creatinine Clearance: 94 mL/min (based on SCr of 0.8 mg/dL).      Recent Labs     03/01/20  0653 03/02/20  0542   WBC 10.0 12.1*   HGB 11.0* 10.3*   HCT 33.3* 30.9*   MCV 88.0 87.9    205       Height:  5' 4\" (162.6 cm)  Weight: 236 lb 5.3 oz (107.2 kg)    IBW: 54.7 kg    Adjusted: 75.62 kg  BMI: 40.6      Micro:  Date Site Micro Susceptibility   2/26 Blood x2 No growth to date              Vancomycin doses/ levels:  Date Time Dose Level   2/26 1254 1500 mg ( [facility] [place])    2/27 0622  10.0 mcg/mL   2/27 1318 1500 mg 2/28 0526  10.3 mcg/mL           Assessment/Plan:  1. Sepsis/ RUE cellulitis:  vancomycin + cefepime  -  day #5  Vancomycin - pharmacy to dose  · Renal function back to baseline of 0.8. · Vancomycin was increased on 2/29 to 1.5g IV q12h with KENAN resolution   · Dose already given this AM before trough due (doses not re-timed based on late administration 3/1 after lost IV access)   · Trough re-ordered prior to 2200 dose tonight   · Clinical condition will be monitored closely, and levels will be ordered & dose adjustments made as appropriate. Cefepime  · Continue 2g q8h   · Renal function will be monitored closely and dosing will be adjusted as appropriate.     Please call with questions--  Thanks--  Vanessa Raymond, PharmD, [address] [place] [address]: [phone]  [address] [place] [address]: [phone]  3/2/2020 11:07 AM

## 2020-03-03 PROBLEM — R50.9 FEVER: Status: ACTIVE | Noted: 2020-03-03

## 2020-03-03 PROBLEM — D72.829 LEUKOCYTOSIS: Status: ACTIVE | Noted: 2020-03-03

## 2020-03-03 PROBLEM — L03.113 RIGHT ARM CELLULITIS: Status: ACTIVE | Noted: 2020-03-03

## 2020-03-03 LAB
ANION GAP SERPL CALCULATED.3IONS-SCNC: 15 MMOL/L (ref 3–16)
BASOPHILS ABSOLUTE: 0 K/UL (ref 0–0.2)
BASOPHILS RELATIVE PERCENT: 0.3 %
BUN BLDV-MCNC: 12 MG/DL (ref 7–20)
CALCIUM SERPL-MCNC: 9.2 MG/DL (ref 8.3–10.6)
CHLORIDE BLD-SCNC: 101 MMOL/L (ref 99–110)
CO2: 21 MMOL/L (ref 21–32)
CREAT SERPL-MCNC: 0.9 MG/DL (ref 0.6–1.1)
EOSINOPHILS ABSOLUTE: 0.1 K/UL (ref 0–0.6)
EOSINOPHILS RELATIVE PERCENT: 0.6 %
GFR AFRICAN AMERICAN: >60
GFR NON-AFRICAN AMERICAN: >60
GLUCOSE BLD-MCNC: 93 MG/DL (ref 70–99)
HCT VFR BLD CALC: 29.1 % (ref 36–48)
HEMOGLOBIN: 9.8 G/DL (ref 12–16)
LYMPHOCYTES ABSOLUTE: 1.8 K/UL (ref 1–5.1)
LYMPHOCYTES RELATIVE PERCENT: 16.6 %
MCH RBC QN AUTO: 29.4 PG (ref 26–34)
MCHC RBC AUTO-ENTMCNC: 33.6 G/DL (ref 31–36)
MCV RBC AUTO: 87.3 FL (ref 80–100)
MONOCYTES ABSOLUTE: 0.7 K/UL (ref 0–1.3)
MONOCYTES RELATIVE PERCENT: 6.5 %
NEUTROPHILS ABSOLUTE: 8.5 K/UL (ref 1.7–7.7)
NEUTROPHILS RELATIVE PERCENT: 76 %
PDW BLD-RTO: 14.4 % (ref 12.4–15.4)
PLATELET # BLD: 209 K/UL (ref 135–450)
PMV BLD AUTO: 7.6 FL (ref 5–10.5)
POTASSIUM REFLEX MAGNESIUM: 4.2 MMOL/L (ref 3.5–5.1)
RBC # BLD: 3.33 M/UL (ref 4–5.2)
SODIUM BLD-SCNC: 137 MMOL/L (ref 136–145)
WBC # BLD: 11.1 K/UL (ref 4–11)

## 2020-03-03 PROCEDURE — 99255 IP/OBS CONSLTJ NEW/EST HI 80: CPT | Performed by: INTERNAL MEDICINE

## 2020-03-03 PROCEDURE — 97535 SELF CARE MNGMENT TRAINING: CPT

## 2020-03-03 PROCEDURE — 80048 BASIC METABOLIC PNL TOTAL CA: CPT

## 2020-03-03 PROCEDURE — 6360000002 HC RX W HCPCS: Performed by: INTERNAL MEDICINE

## 2020-03-03 PROCEDURE — 97116 GAIT TRAINING THERAPY: CPT

## 2020-03-03 PROCEDURE — 97530 THERAPEUTIC ACTIVITIES: CPT

## 2020-03-03 PROCEDURE — 2580000003 HC RX 258: Performed by: INTERNAL MEDICINE

## 2020-03-03 PROCEDURE — 94640 AIRWAY INHALATION TREATMENT: CPT

## 2020-03-03 PROCEDURE — 36415 COLL VENOUS BLD VENIPUNCTURE: CPT

## 2020-03-03 PROCEDURE — 1200000000 HC SEMI PRIVATE

## 2020-03-03 PROCEDURE — 85025 COMPLETE CBC W/AUTO DIFF WBC: CPT

## 2020-03-03 PROCEDURE — 6370000000 HC RX 637 (ALT 250 FOR IP): Performed by: INTERNAL MEDICINE

## 2020-03-03 PROCEDURE — 6370000000 HC RX 637 (ALT 250 FOR IP): Performed by: FAMILY MEDICINE

## 2020-03-03 RX ADMIN — Medication 400 MG: at 08:55

## 2020-03-03 RX ADMIN — ARFORMOTEROL TARTRATE 15 MCG: 15 SOLUTION RESPIRATORY (INHALATION) at 21:38

## 2020-03-03 RX ADMIN — POTASSIUM CHLORIDE 20 MEQ: 20 TABLET, EXTENDED RELEASE ORAL at 18:39

## 2020-03-03 RX ADMIN — CEFEPIME 2 G: 2 INJECTION, POWDER, FOR SOLUTION INTRAMUSCULAR; INTRAVENOUS at 02:59

## 2020-03-03 RX ADMIN — BUDESONIDE 500 MCG: 0.5 SUSPENSION RESPIRATORY (INHALATION) at 21:38

## 2020-03-03 RX ADMIN — AMPICILLIN SODIUM AND SULBACTAM SODIUM 3 G: 2; 1 INJECTION, POWDER, FOR SOLUTION INTRAMUSCULAR; INTRAVENOUS at 18:44

## 2020-03-03 RX ADMIN — CEFEPIME 2 G: 2 INJECTION, POWDER, FOR SOLUTION INTRAMUSCULAR; INTRAVENOUS at 08:55

## 2020-03-03 RX ADMIN — HEPARIN SODIUM 5000 UNITS: 5000 INJECTION INTRAVENOUS; SUBCUTANEOUS at 22:22

## 2020-03-03 RX ADMIN — VANCOMYCIN HYDROCHLORIDE 1500 MG: 10 INJECTION, POWDER, LYOPHILIZED, FOR SOLUTION INTRAVENOUS at 10:51

## 2020-03-03 RX ADMIN — POTASSIUM CHLORIDE 20 MEQ: 20 TABLET, EXTENDED RELEASE ORAL at 08:55

## 2020-03-03 RX ADMIN — BUDESONIDE 500 MCG: 0.5 SUSPENSION RESPIRATORY (INHALATION) at 09:51

## 2020-03-03 RX ADMIN — ACETAMINOPHEN 650 MG: 325 TABLET ORAL at 15:01

## 2020-03-03 RX ADMIN — Medication 10 ML: at 08:55

## 2020-03-03 RX ADMIN — MODAFINIL 200 MG: 200 TABLET ORAL at 09:50

## 2020-03-03 RX ADMIN — ARFORMOTEROL TARTRATE 15 MCG: 15 SOLUTION RESPIRATORY (INHALATION) at 09:51

## 2020-03-03 RX ADMIN — TRAZODONE HYDROCHLORIDE 100 MG: 100 TABLET ORAL at 20:11

## 2020-03-03 RX ADMIN — AMPICILLIN SODIUM AND SULBACTAM SODIUM 3 G: 2; 1 INJECTION, POWDER, FOR SOLUTION INTRAMUSCULAR; INTRAVENOUS at 22:22

## 2020-03-03 RX ADMIN — HEPARIN SODIUM 5000 UNITS: 5000 INJECTION INTRAVENOUS; SUBCUTANEOUS at 15:06

## 2020-03-03 ASSESSMENT — PAIN SCALES - GENERAL
PAINLEVEL_OUTOF10: 0
PAINLEVEL_OUTOF10: 0
PAINLEVEL_OUTOF10: 4

## 2020-03-03 NOTE — PROGRESS NOTES
Occupational Therapy  Facility/Department: [facility] 5T ORTHO/NEURO  Daily Treatment Note  NAME: Nicole Pereira  : 1965  MRN: [id number]    Date of Service: 3/3/2020    Discharge Recommendations:  Nicole Pereira scored a 21/24 on the AM-PAC ADL Inpatient form. Current research shows that an AM-PAC score of 18 or greater is typically associated with a discharge to the patient's home setting. Based on the patients AM-PAC score and their current ADL deficits, it is recommended that the patient have 2-3 sessions per week of Occupational Therapy at d/c to increase the patients independence. OT Equipment Recommendations  Other: may benefit from shower chair, pending progress    Assessment   Performance deficits / Impairments: Decreased functional mobility ; Decreased ADL status; Decreased endurance  Assessment: Patient demonstrating improved activity tolerance for grooming in stance and hallway mobility this date. Patient reporting that she is nearing baseline function. Will continue with OT services per POC. Treatment Diagnosis: Impaired ADLs, mobility and endurance d/t sepsis  Prognosis: Good  OT Education: Transfer Training;ADL Adaptive Strategies; Energy Conservation  Patient Education: verb understanding  REQUIRES OT FOLLOW UP: Yes  Activity Tolerance  Activity Tolerance: Patient Tolerated treatment well  Safety Devices  Type of devices: Call light within reach; Chair alarm in place; Left in chair;Nurse notified         Patient Diagnosis(es): There were no encounter diagnoses.       has a past medical history of Adenomatous colon polyp, Anxiety, Asthma, Bilateral kidney stones, Cellulitis, lip, Cervical spinal stenosis, Cholelithiasis, Clotting disorder ([address] Crohn's disease ([address] DDD (degenerative disc disease), cervical, DDD (degenerative disc disease), lumbar, Depression, DVT, lower extremity ([address] Fibrocystic breast, GERD (gastroesophageal reflux disease), Hormone disorder, Hx MRSA infection, item retrieval at RW level from closet in order to set up clothing for shower later this date with PCA  Functional Mobility  Functional - Mobility Device: Rolling Walker  Activity: To/from bathroom  Assist Level: Supervision  Functional Mobility Comments: + short distance within hallway to increase activity tolerance. No LOB, slight fatigue near end of session  Toilet Transfers  Toilet - Technique: Ambulating  Equipment Used: Standard toilet  Toilet Transfer: Stand by assistance  Toilet Transfers Comments: with use of grab bar  Bed mobility  Supine to Sit: Modified independent(HOB elevated and use of rail)  Transfers  Sit to stand: Stand by assistance(CGA from low surface of pull out mattress without arm rests, all others SBA)  Stand to sit: Stand by assistance  Cognition  Overall Cognitive Status: WFL  Type of ROM/Therapeutic Exercise  Comment: Able to recall recommended exercises from previous session, encouraged to complete additional shoulder movements (h abduction, sh abduction) as able and continue to elevate UE.  Verb understanding     Plan   Plan  Times per week: 2-5x  Times per day: Daily  Current Treatment Recommendations: Safety Education & Training, Patient/Caregiver Education & Training, Self-Care / ADL, Functional Mobility Training, Endurance Training    AM-PAC Score        AM-[facility] Inpatient Daily Activity Raw Score: 21 (03/03/20 1021)  AM-PAC Inpatient ADL T-Scale Score : 44.27 (03/03/20 1021)  ADL Inpatient CMS 0-100% Score: 32.79 (03/03/20 1021)  ADL Inpatient CMS G-Code Modifier : Mei Busch (03/03/20 1021)    Goals  Short term goals  Time Frame for Short term goals: by discharge   Short term goal 1: supervision toilet transfer- ongoing (SBA)  Short term goal 2: supervision LB dressing- met for socks, ongoing for pants  Short term goal 3: tolerate standing 10 minutes for ADLs/mobility for ADLs- goal met 3/3  Patient Goals   Patient goals : return home       Therapy Time   Individual Concurrent Group

## 2020-03-03 NOTE — PLAN OF CARE
Problem: Falls - Risk of:  Goal: Will remain free from falls  Description  Will remain free from falls  Outcome: Ongoing   Patient is at risk for falls. Patient is in bed with bed alarm on, fall risk ID, Nonskid socks, Bed in lowest position. Call light and bedside table are within reach. Patient calls out approprietly. Will continue to monitor.

## 2020-03-03 NOTE — PROGRESS NOTES
Nephrology Progress Note    Admit Date: 2/26/2020  Day: 5  Diet: DIET LOW SODIUM 2 GM;    CC: No chief complaint on file. Interval history:     Good UO   Cr better   Medications:     Scheduled Meds:   magnesium oxide  400 mg Oral Daily    potassium chloride  20 mEq Oral BID WC    traZODone  100 mg Oral Nightly    cefepime  2 g Intravenous Q8H    vancomycin  1,500 mg Intravenous Q12H    modafinil  200 mg Oral Daily    heparin (porcine)  5,000 Units Subcutaneous 3 times per day    budesonide  0.5 mg Nebulization BID    And    Arformoterol Tartrate  15 mcg Nebulization BID    sodium chloride flush  10 mL Intravenous 2 times per day     Continuous Infusions:    PRN Meds:HYDROcodone 5 mg - acetaminophen, ondansetron, albuterol, sodium chloride flush, acetaminophen, acetaminophen    Objective:   Vitals:   T-max:  Patient Vitals for the past 8 hrs:   BP Temp Temp src Pulse Resp SpO2   03/02/20 2022 -- -- -- -- -- 99 %   03/02/20 1913 (!) 168/90 99.8 °F (37.7 °C) Oral 103 16 99 %   03/02/20 1600 (!) 146/79 100.9 °F (38.3 °C) Oral 102 16 94 %   03/02/20 1430 -- 100.8 °F (38.2 °C) Oral -- -- --       Intake/Output Summary (Last 24 hours) at 3/2/2020 2220  Last data filed at 3/2/2020 7497  Gross per 24 hour   Intake 300 ml   Output --   Net 300 ml       Physical Exam  Vitals signs and nursing note reviewed. Exam conducted with a chaperone present. Constitutional:       Comments: Laying in bed, no apparent distress    HENT:      Head: Atraumatic. Eyes:      Conjunctiva/sclera: Conjunctivae normal.      Pupils: Pupils are equal, round, and reactive to light. Neck:      Musculoskeletal: Neck supple. Cardiovascular:      Rate and Rhythm: Normal rate and regular rhythm. Heart sounds: Normal heart sounds. No murmur. Pulmonary:      Effort: Pulmonary effort is normal.      Breath sounds: Normal breath sounds. No wheezing. Abdominal:      General: Bowel sounds are normal.      Palpations: Abdomen is soft. Tenderness: There is no abdominal tenderness. Musculoskeletal:         General: Swelling present. Comments: Erythema, swelling in RUE. Warm to touch   Skin:     General: Skin is warm and dry. Capillary Refill: Capillary refill takes less than 2 seconds. Neurological:      General: No focal deficit present. Mental Status: She is alert and oriented to person, place, and time. Cranial Nerves: No cranial nerve deficit. LABS:    CBC:   Recent Labs     02/29/20 0617 03/01/20  0653 03/02/20  0542   WBC 9.6 10.0 12.1*   HGB 10.0* 11.0* 10.3*   HCT 29.9* 33.3* 30.9*    211 205   MCV 88.0 88.0 87.9     Renal:    Recent Labs     02/29/20 0617 03/01/20  0652 03/02/20  0542    139 138   K 4.0 3.0* 3.8    101 102   CO2 22 22 22   BUN 18 12 9   CREATININE 1.0 0.9 0.8   GLUCOSE 88 102* 96   CALCIUM 9.3 9.4 9.6   MG  --  1.50*  --    ANIONGAP 12 16 14     Hepatic: No results for input(s): AST, ALT, BILITOT, BILIDIR, PROT, LABALBU, ALKPHOS in the last 72 hours. Troponin: No results for input(s): TROPONINI in the last 72 hours. BNP: No results for input(s): BNP in the last 72 hours. Lipids: No results for input(s): CHOL, HDL in the last 72 hours. Invalid input(s): LDLCALCU, TRIGLYCERIDE  ABGs:  No results for input(s): PHART, LQC7CHK, PO2ART, HKT8ARK, BEART, THGBART, L4AKMGED, PWW8TBK in the last 72 hours. INR: No results for input(s): INR in the last 72 hours. Lactate: No results for input(s): LACTATE in the last 72 hours. Cultures:  -----------------------------------------------------------------  RAD:   CT HUMERUS RIGHT WO CONTRAST   Final Result   1. Subcutaneous edema is identified about the humerus and forearm. The findings are in keeping with the clinical history of cellulitis. The findings are similar in comparison to the prior study when allowing for differences in technique. No abscess or    bursal fluid collection is identified.       VL Extremity Venous Right   Final Result      CT HUMERUS RIGHT WO CONTRAST   Final Result      Extensive subcutis space edema/inflammation with overlying skin thickening. No abscess identified            Assessment/Plan: KENAN  Creatinine 3.7 on admission. Improved with IVF with good urine output. Still above prior baseline. Likely prerenal KENAN. - Cr better   - d/c IVF   -Avoid nephrotoxins  -D/c bactrim    RUE cellulitis  -On IV vanc and cefepime  -Continue vanc but monitor levels closely    Chronic HTN  Hypotension on presentation, improved with IVF. Continue to monitor.   Suzanne Aguero MD

## 2020-03-03 NOTE — PROGRESS NOTES
Physical Therapy  Facility/Department: [place] [address] 5T ORTHO/NEURO  Daily Treatment Note  NAME: Alessandra Damon  : 1965  MRN: [id number]    Date of Service: 3/3/2020    Discharge Recommendations:Alethea Carlos Has scored a 18/24 on the AM-PAC short mobility form. Current research shows that an AM-PAC score of 18 or greater is typically associated with a discharge to the patient's home setting. Based on the patients AM-PAC score and their current functional mobility deficits, it is recommended that the patient have 2-3 sessions per week of Physical Therapy at d/c to increase the patients independence. PT Equipment Recommendations  Equipment Needed: (rolling walker)    Assessment   Body structures, Functions, Activity limitations: Decreased functional mobility ; Decreased balance  Assessment: Decreased assist for transfers/gt. Increased gt endurance. Progressing well. Plans to return home at d/c. Rec cont skilled PT to maximize mobility and independence  Treatment Diagnosis: Decreased functional mobility   Patient Education: role of PT, use of call light, d/c planning; pt verb understanding  REQUIRES PT FOLLOW UP: Yes     Patient Diagnosis(es): There were no encounter diagnoses.      has a past medical history of Adenomatous colon polyp, Anxiety, Asthma, Bilateral kidney stones, Cellulitis, lip, Cervical spinal stenosis, Cholelithiasis, Clotting disorder ([address] Crohn's disease ([address] DDD (degenerative disc disease), cervical, DDD (degenerative disc disease), lumbar, Depression, DVT, lower extremity ([address] Fibrocystic breast, GERD (gastroesophageal reflux disease), Hormone disorder, Hx MRSA infection, Hyperlipidemia, Hypertension, Insomnia, Migraine, MS (multiple sclerosis) ([address] Multinodular goiter, Nausea & vomiting, Osteoarthritis, Polyp of colon, adenomatous, PONV (postoperative nausea and vomiting), Rash, Right leg DVT ([address] Sleep apnea, Spondylolisthesis of lumbar region, Thyroid nodule, Ureteral stone, Ureterolithiasis, Urinary incontinence, Venous insufficiency, and Weakness of left arm. has a past surgical history that includes bladder suspension (1995); Hysterectomy (1995); Breast surgery (2000); Colonoscopy (3/13/2013); skin biopsy; Salpingo-oophorectomy (4/12/2013); other surgical history (Bilateral, 06/14/2013); Diagnostic Cardiac Cath Lab Procedure; and other surgical history (N/A, 08/11/2016). Restrictions  Restrictions/Precautions  Required Braces or Orthoses?: No  Position Activity Restriction  Other position/activity restrictions: up as tolerated  Subjective   General  Chart Reviewed: Yes  Additional Pertinent Hx: 62 yof admitted for treatment of sepsis. Patient was recently treated at [facility] ED on 2/24 for possible cellulitis of R posterior arm with Bactrim/Keflex. On 2/26 patient presented to [facility] [place] ED with fever and confusion. CT head unremarkable, but Xray chest revealed possible pneumonia. Family / Caregiver Present: Yes()  Subjective  Subjective: Pt found sitting. Agreeable to PT.     Pain Screening  Patient Currently in Pain: Yes(RUE, not rated, RN aware)  Vital Signs  Patient Currently in Pain: Yes(RUE, not rated, RN aware)       Orientation     Cognition      Objective      Transfers  Sit to Stand: Stand by assistance  Stand to sit: Stand by assistance  Ambulation  Ambulation?: Yes  Ambulation 1  Device: Rolling Walker  Assistance: Stand by assistance  Quality of Gait: decreased dar, steady with walker  Distance: 200'         Exercises  Hip Flexion: independent x 10 reps BLE  Knee Long Arc Quad: independent x 10 reps BLE  Ankle Pumps: independent x 10 reps BLE                        G-Code     OutComes Score                                                     AM-PAC Score  AM-PAC Inpatient Mobility Raw Score : 18 (03/02/20 1527)  AM-PAC Inpatient T-Scale Score : 43.63 (03/02/20 1527)  Mobility Inpatient CMS 0-100% Score: 46.58 (03/02/20 1527)  Mobility Inpatient CMS G-Code Modifier : CK (03/02/20 1527)          Goals  Short term goals  Time Frame for Short term goals: d/c  Short term goal 1: sup<>sit independent . Ongoing  Short term goal 2: sit<>stand supervision with RW. Ongoing  Short term goal 3: amb 150' with RW and SBA. MET 3/3. Revised goal:  Pt will amb >300' with LRAD supervision  Patient Goals   Patient goals : return home when able    Plan    Plan  Times per week: 2-5  Times per day: Daily  Current Treatment Recommendations: Strengthening, ROM, Balance Training, Gait Training, Stair training, Functional Mobility Training, Transfer Training, Endurance Training, Home Exercise Program, Safety Education & Training  Safety Devices  Type of devices: Gait belt, Left in chair, Chair alarm in place, Call light within reach, Nurse notified     Therapy Time   Individual Concurrent Group Co-treatment   Time In 1424         Time Out 1437         Minutes 13               Timed Code Treatment Minutes:  13    Total Treatment Minutes:  13  If pt d/c'd prior to next treatment, this note serves as a discharge note.     Joshua Concepcion, PT

## 2020-03-03 NOTE — PROGRESS NOTES
Patient is alert and oriented. Vital signs are stable at this time. PRN tylenol given for pain 4/10 and elevated temp. Patient ambulating to bathroom with walker and assist x1, tolerating well.

## 2020-03-03 NOTE — PROGRESS NOTES
Nephrology Progress Note    Admit Date: 2/26/2020  Day: 6  Diet: DIET LOW SODIUM 2 GM;    CC: No chief complaint on file. Interval history:     Good UO   Cr better   Medications:     Scheduled Meds:   magnesium oxide  400 mg Oral Daily    potassium chloride  20 mEq Oral BID WC    traZODone  100 mg Oral Nightly    cefepime  2 g Intravenous Q8H    vancomycin  1,500 mg Intravenous Q12H    modafinil  200 mg Oral Daily    heparin (porcine)  5,000 Units Subcutaneous 3 times per day    budesonide  0.5 mg Nebulization BID    And    Arformoterol Tartrate  15 mcg Nebulization BID    sodium chloride flush  10 mL Intravenous 2 times per day     Continuous Infusions:    PRN Meds:HYDROcodone 5 mg - acetaminophen, ondansetron, albuterol, sodium chloride flush, acetaminophen, acetaminophen    Objective:   Vitals:   T-max:  Patient Vitals for the past 8 hrs:   BP Temp Temp src Pulse Resp SpO2   03/03/20 0732 130/71 99.6 °F (37.6 °C) Oral 98 16 94 %   03/03/20 0300 135/70 99.6 °F (37.6 °C) Oral 85 16 96 %       Intake/Output Summary (Last 24 hours) at 3/3/2020 0744  Last data filed at 3/3/2020 0256  Gross per 24 hour   Intake 170 ml   Output --   Net 170 ml       Physical Exam  Vitals signs and nursing note reviewed. Exam conducted with a chaperone present. Constitutional:       Comments: Laying in bed, no apparent distress    HENT:      Head: Atraumatic. Eyes:      Conjunctiva/sclera: Conjunctivae normal.      Pupils: Pupils are equal, round, and reactive to light. Neck:      Musculoskeletal: Neck supple. Cardiovascular:      Rate and Rhythm: Normal rate and regular rhythm. Heart sounds: Normal heart sounds. No murmur. Pulmonary:      Effort: Pulmonary effort is normal.      Breath sounds: Normal breath sounds. No wheezing. Abdominal:      General: Bowel sounds are normal.      Palpations: Abdomen is soft. Tenderness: There is no abdominal tenderness.    Musculoskeletal:         General: Swelling space edema/inflammation with overlying skin thickening. No abscess identified            Assessment/Plan: KENAN  Creatinine 3.7 on admission. Improved with IVF with good urine output. Still above prior baseline. Likely prerenal KENAN. - Cr better   - d/c IVF   -Avoid nephrotoxins  -D/c bactrim    RUE cellulitis  -On IV vanc and cefepime  -Continue vanc but monitor levels closely    Chronic HTN  Hypotension on presentation, improved with IVF. Continue to monitor.   Oleg Byrnes MD

## 2020-03-03 NOTE — PROGRESS NOTES
Clinical Pharmacy Consult Note    Admit date: 2/26/2020     Interval Update: LORENAO, still with intermittent fevers, renal function back to baseline (SCr 4.6-> 0.8)    Subjective/Objective:  62 yof admitted for treatment of sepsis and RUE cellulitis. Patient was recently treated at [facility] ED on 2/24 for possible cellulitis of R posterior arm with Bactrim/Keflex. On 2/26 patient presented to [address] ED with fever and confusion. CT head unremarkable, but Xray chest revealed possible pneumonia. SCr also significantly elevated at 4.6 (CrCl 16 mL/min). At that time patient received doses of cefepime 2g and vancomycin 1.5g. PMHx significant for provoked DVT (not on [facility]), MS on Peginterferon, HTN, HLD, h/o MRSA (2013). KENAN thought 2/2 poor intake and infection per nephrology. Pharmacy is consulted to dose Vancomycin per Dr. Queta Candelaria    Vancomycin history: Patient has received vancomycin 1.5g IV q12h in 2013. Trough resulted at 14.5 mcg/mL. At that time weight was 115 kg and SCr ~1.1 mg/dL. Pertinent Medications:    Cefepime 2g IV q8h (2/27- current) - day #7  Vancomycin, pharmacy to dose  -- day #7   Intermittent dosing    1500 mg IV x1 (2/26 @ 1254 @ [facility])   1500 mg IV x1 2/27 @ 1318   1500 mg IV q18h (2/28- 2/29)   1500 mg IV q12h (2/29 - current)       Recent Labs     03/02/20  0542 03/03/20  0604    137   K 3.8 4.2    101   CO2 22 21   BUN 9 12   CREATININE 0.8 0.9       Estimated Creatinine Clearance: 83 mL/min (based on SCr of 0.9 mg/dL).      Recent Labs     03/02/20  0542 03/03/20  0604   WBC 12.1* 11.1*   HGB 10.3* 9.8*   HCT 30.9* 29.1*   MCV 87.9 87.3    209       Height:  5' 4\" (162.6 cm)  Weight: 236 lb 5.3 oz (107.2 kg)    IBW: 54.7 kg    Adjusted: 75.62 kg  BMI: 40.6      Micro:  Date Site Micro Susceptibility   2/26 Blood x2 No growth to date              Vancomycin doses/ levels:  Date Time Dose Level   2/26 1254 1500 mg ( [facility] [place])    2/27 0622  10.0 mcg/mL   2/27 1318 1500 mg

## 2020-03-03 NOTE — PLAN OF CARE
Problem: Falls - Risk of:  Goal: Will remain free from falls  Description  Will remain free from falls  3/3/2020 0918 by Jhonny Jordan RN  Outcome: Ongoing   Fall risk precautions in place. Bed is locked and in lowest position. 2/4 side rails up. Call light within reach. Fall risk Bracelet in place. Frequent checks on patient. Free from falls at this time. Will continue to monitor. Problem: Activity:  Goal: Ability to tolerate increased activity will improve  Description  Ability to tolerate increased activity will improve  Outcome: Ongoing   Patient ambulating with walker, GB and assist x1, tolerating well.

## 2020-03-04 LAB
ANION GAP SERPL CALCULATED.3IONS-SCNC: 13 MMOL/L (ref 3–16)
BASOPHILS ABSOLUTE: 0.1 K/UL (ref 0–0.2)
BASOPHILS RELATIVE PERCENT: 0.8 %
BUN BLDV-MCNC: 14 MG/DL (ref 7–20)
CALCIUM SERPL-MCNC: 9.4 MG/DL (ref 8.3–10.6)
CHLORIDE BLD-SCNC: 101 MMOL/L (ref 99–110)
CO2: 20 MMOL/L (ref 21–32)
CREAT SERPL-MCNC: 0.9 MG/DL (ref 0.6–1.1)
EOSINOPHILS ABSOLUTE: 0.1 K/UL (ref 0–0.6)
EOSINOPHILS RELATIVE PERCENT: 1.4 %
GFR AFRICAN AMERICAN: >60
GFR NON-AFRICAN AMERICAN: >60
GLUCOSE BLD-MCNC: 103 MG/DL (ref 70–99)
HCT VFR BLD CALC: 29.4 % (ref 36–48)
HEMOGLOBIN: 10 G/DL (ref 12–16)
LYMPHOCYTES ABSOLUTE: 1.7 K/UL (ref 1–5.1)
LYMPHOCYTES RELATIVE PERCENT: 21.2 %
MCH RBC QN AUTO: 29.3 PG (ref 26–34)
MCHC RBC AUTO-ENTMCNC: 33.9 G/DL (ref 31–36)
MCV RBC AUTO: 86.3 FL (ref 80–100)
MONOCYTES ABSOLUTE: 0.5 K/UL (ref 0–1.3)
MONOCYTES RELATIVE PERCENT: 6.3 %
NEUTROPHILS ABSOLUTE: 5.7 K/UL (ref 1.7–7.7)
NEUTROPHILS RELATIVE PERCENT: 70.3 %
PDW BLD-RTO: 13.8 % (ref 12.4–15.4)
PLATELET # BLD: 225 K/UL (ref 135–450)
PMV BLD AUTO: 8.1 FL (ref 5–10.5)
POTASSIUM REFLEX MAGNESIUM: 4 MMOL/L (ref 3.5–5.1)
RBC # BLD: 3.41 M/UL (ref 4–5.2)
SODIUM BLD-SCNC: 134 MMOL/L (ref 136–145)
WBC # BLD: 8.1 K/UL (ref 4–11)

## 2020-03-04 PROCEDURE — 6370000000 HC RX 637 (ALT 250 FOR IP): Performed by: FAMILY MEDICINE

## 2020-03-04 PROCEDURE — 97535 SELF CARE MNGMENT TRAINING: CPT

## 2020-03-04 PROCEDURE — 2580000003 HC RX 258: Performed by: INTERNAL MEDICINE

## 2020-03-04 PROCEDURE — 94640 AIRWAY INHALATION TREATMENT: CPT

## 2020-03-04 PROCEDURE — 1200000000 HC SEMI PRIVATE

## 2020-03-04 PROCEDURE — 6360000002 HC RX W HCPCS: Performed by: INTERNAL MEDICINE

## 2020-03-04 PROCEDURE — 6370000000 HC RX 637 (ALT 250 FOR IP): Performed by: INTERNAL MEDICINE

## 2020-03-04 PROCEDURE — 97110 THERAPEUTIC EXERCISES: CPT

## 2020-03-04 PROCEDURE — 36415 COLL VENOUS BLD VENIPUNCTURE: CPT

## 2020-03-04 PROCEDURE — 99233 SBSQ HOSP IP/OBS HIGH 50: CPT | Performed by: INTERNAL MEDICINE

## 2020-03-04 PROCEDURE — 97530 THERAPEUTIC ACTIVITIES: CPT

## 2020-03-04 PROCEDURE — 80048 BASIC METABOLIC PNL TOTAL CA: CPT

## 2020-03-04 PROCEDURE — 85025 COMPLETE CBC W/AUTO DIFF WBC: CPT

## 2020-03-04 RX ORDER — LOSARTAN POTASSIUM 25 MG/1
50 TABLET ORAL DAILY
Status: DISCONTINUED | OUTPATIENT
Start: 2020-03-04 | End: 2020-03-05 | Stop reason: HOSPADM

## 2020-03-04 RX ORDER — GABAPENTIN 100 MG/1
100 CAPSULE ORAL 2 TIMES DAILY
COMMUNITY
End: 2020-04-28 | Stop reason: SDUPTHER

## 2020-03-04 RX ORDER — DULOXETIN HYDROCHLORIDE 60 MG/1
60 CAPSULE, DELAYED RELEASE ORAL 2 TIMES DAILY
Status: DISCONTINUED | OUTPATIENT
Start: 2020-03-04 | End: 2020-03-05 | Stop reason: HOSPADM

## 2020-03-04 RX ORDER — MODAFINIL 200 MG/1
200 TABLET ORAL DAILY
COMMUNITY
Start: 2020-02-19

## 2020-03-04 RX ADMIN — ARFORMOTEROL TARTRATE 15 MCG: 15 SOLUTION RESPIRATORY (INHALATION) at 19:58

## 2020-03-04 RX ADMIN — DULOXETINE HYDROCHLORIDE 60 MG: 60 CAPSULE, DELAYED RELEASE ORAL at 18:18

## 2020-03-04 RX ADMIN — BUDESONIDE 500 MCG: 0.5 SUSPENSION RESPIRATORY (INHALATION) at 19:58

## 2020-03-04 RX ADMIN — Medication 400 MG: at 10:36

## 2020-03-04 RX ADMIN — AMPICILLIN SODIUM AND SULBACTAM SODIUM 3 G: 2; 1 INJECTION, POWDER, FOR SOLUTION INTRAMUSCULAR; INTRAVENOUS at 05:54

## 2020-03-04 RX ADMIN — AMPICILLIN SODIUM AND SULBACTAM SODIUM 3 G: 2; 1 INJECTION, POWDER, FOR SOLUTION INTRAMUSCULAR; INTRAVENOUS at 23:30

## 2020-03-04 RX ADMIN — ACETAMINOPHEN 650 MG: 325 TABLET ORAL at 19:36

## 2020-03-04 RX ADMIN — Medication 10 ML: at 22:16

## 2020-03-04 RX ADMIN — HEPARIN SODIUM 5000 UNITS: 5000 INJECTION INTRAVENOUS; SUBCUTANEOUS at 15:05

## 2020-03-04 RX ADMIN — AMPICILLIN SODIUM AND SULBACTAM SODIUM 3 G: 2; 1 INJECTION, POWDER, FOR SOLUTION INTRAMUSCULAR; INTRAVENOUS at 18:18

## 2020-03-04 RX ADMIN — HEPARIN SODIUM 5000 UNITS: 5000 INJECTION INTRAVENOUS; SUBCUTANEOUS at 05:54

## 2020-03-04 RX ADMIN — AMPICILLIN SODIUM AND SULBACTAM SODIUM 3 G: 2; 1 INJECTION, POWDER, FOR SOLUTION INTRAMUSCULAR; INTRAVENOUS at 10:38

## 2020-03-04 RX ADMIN — LOSARTAN POTASSIUM 50 MG: 25 TABLET ORAL at 10:36

## 2020-03-04 RX ADMIN — Medication 10 ML: at 10:37

## 2020-03-04 RX ADMIN — MODAFINIL 200 MG: 200 TABLET ORAL at 10:38

## 2020-03-04 RX ADMIN — TRAZODONE HYDROCHLORIDE 100 MG: 100 TABLET ORAL at 22:15

## 2020-03-04 ASSESSMENT — PAIN SCALES - GENERAL
PAINLEVEL_OUTOF10: 1
PAINLEVEL_OUTOF10: 0
PAINLEVEL_OUTOF10: 1

## 2020-03-04 NOTE — PROGRESS NOTES
Patient A&Ox4, VSS. RUE wrapped in ace wrap, remains clean, dry, and intact. Neuro checks at baseline. Patient reports minimal pain, has not required pain medication. IV abx infusing per MAR. Patient ambulates in room and hallways frequently, tolerates well with a walker. Patient instructed to call out for needs. Will continue to monitor.

## 2020-03-04 NOTE — PROGRESS NOTES
Comments: Erythema, swelling in RUE. Warm to touch   Skin:     General: Skin is warm and dry. Capillary Refill: Capillary refill takes less than 2 seconds. Neurological:      General: No focal deficit present. Mental Status: She is alert and oriented to person, place, and time. Cranial Nerves: No cranial nerve deficit. LABS:    CBC:   Recent Labs     03/02/20 0542 03/03/20 0604 03/04/20  0528   WBC 12.1* 11.1* 8.1   HGB 10.3* 9.8* 10.0*   HCT 30.9* 29.1* 29.4*    209 225   MCV 87.9 87.3 86.3     Renal:    Recent Labs     03/02/20 0542 03/03/20 0604 03/04/20 0528    137 134*   K 3.8 4.2 4.0    101 101   CO2 22 21 20*   BUN 9 12 14   CREATININE 0.8 0.9 0.9   GLUCOSE 96 93 103*   CALCIUM 9.6 9.2 9.4   ANIONGAP 14 15 13     Hepatic: No results for input(s): AST, ALT, BILITOT, BILIDIR, PROT, LABALBU, ALKPHOS in the last 72 hours. Troponin: No results for input(s): TROPONINI in the last 72 hours. BNP: No results for input(s): BNP in the last 72 hours. Lipids: No results for input(s): CHOL, HDL in the last 72 hours. Invalid input(s): LDLCALCU, TRIGLYCERIDE  ABGs:  No results for input(s): PHART, HQJ4VQK, PO2ART, EAH9NUO, BEART, THGBART, B8BYLIZQ, MDW0QIS in the last 72 hours. INR: No results for input(s): INR in the last 72 hours. Lactate: No results for input(s): LACTATE in the last 72 hours. Cultures:  -----------------------------------------------------------------  RAD:   CT HUMERUS RIGHT WO CONTRAST   Final Result   1. Subcutaneous edema is identified about the humerus and forearm. The findings are in keeping with the clinical history of cellulitis. The findings are similar in comparison to the prior study when allowing for differences in technique. No abscess or    bursal fluid collection is identified.       VL Extremity Venous Right   Final Result      CT HUMERUS RIGHT WO CONTRAST   Final Result      Extensive subcutis space edema/inflammation with overlying skin thickening. No abscess identified            Assessment/Plan: KENAN  Creatinine 3.7 on admission. Improved with IVF with good urine output. Still above prior baseline. Likely prerenal KENAN. - Cr better   - d/c IVF   - Avoid nephrotoxins  - no  bactrim    RUE cellulitis  -On abx   - ID following       Chronic HTN  Hypotension on presentation, improved with IVF. Continue to monitor.     Joann Valentin MD

## 2020-03-04 NOTE — CARE COORDINATION
Case Management Assessment           Daily Note                 Date/ Time of Note: 3/4/2020 6:03 PM         Note completed by: Maciej Hauser    Patient Name: Jill Canseco  YOB: 1965    Diagnosis:Sepsis [facility]) [A41.9]  Patient Admission Status: Inpatient    Date of Admission:2/26/2020  4:56 PM Length of Stay: 7 GLOS:        Current Plan of Care: Return to home   ________________________________________________________________________________________  PT AM-PAC: 18 / 24 per last evaluation on: 03/04    OT AM-PAC: 22 / 24 per last evaluation on: 03/04    DME Needs for discharge: walker and bath bench    ________________________________________________________________________________________  Discharge Plan: Home with [address]: [facility]: [facility]    Tentative discharge date: 03/05    Current barriers to discharge: Pt needs a PICC line placed    Referrals completed: [address]: [facility]    Resources/ information provided: Not indicated at this time  ________________________________________________________________________________________  Case Management Notes:Benefits for IV antibiotics verified by Hailee Winston with [employer] and pt has a $2000 ded and $6000 OOP and once those are met then she is covered at 100%. The cost of Ceftriaxone would be about $31.20 per day until both ded and OOP are met. Per pt she has met her ded but is not sure about her OOP yet. Yane Mcconnell and her family were provided with choice of provider; she and her family are in agreement with the discharge plan.     Care Transition Patient: Olive Hauser RN  [facility]  Case Management Department  Ph: [phone]  Fax: [phone]

## 2020-03-04 NOTE — PROGRESS NOTES
(gastroesophageal reflux disease), Hormone disorder, Hx MRSA infection, Hyperlipidemia, Hypertension, Insomnia, Migraine, MS (multiple sclerosis) ([facility] [address] Multinodular goiter, Nausea & vomiting, Osteoarthritis, Polyp of colon, adenomatous, PONV (postoperative nausea and vomiting), Rash, Right leg DVT ([address] Sleep apnea, Spondylolisthesis of lumbar region, Thyroid nodule, Ureteral stone, Ureterolithiasis, Urinary incontinence, Venous insufficiency, and Weakness of left arm. has a past surgical history that includes bladder suspension (1995); Hysterectomy (1995); Breast surgery (2000); Colonoscopy (3/13/2013); skin biopsy; Salpingo-oophorectomy (4/12/2013); other surgical history (Bilateral, 06/14/2013); Diagnostic Cardiac Cath Lab Procedure; and other surgical history (N/A, 08/11/2016). Restrictions  Restrictions/Precautions  Required Braces or Orthoses?: No  Position Activity Restriction  Other position/activity restrictions: up as tolerated  Subjective   General  Chart Reviewed: Yes  Additional Pertinent Hx: Pt presented 2/26 with AMS, R arm swelling, open cat bite L wrist. Admitted for sepsis related to R UE cellulitis, ARF. CT R humerus: Extensive subcutis space edema/inflammation with overlying skin thickening. No abscess identified; CT head (-), R UE dopplers (-). PMHx: Multiple sclerosis, insomnia, HTN, HLD, depression, DDD, asthma  Family / Caregiver Present: No  Referring Practitioner: Carolyn Mukherjee MD  Diagnosis: sepsis  Subjective  Subjective: Pt in chair, agreeable to work with OT.   Vital Signs  Patient Currently in Pain: Denies   Orientation-WNL     Objective    ADL  Feeding: Independent  Toileting: Stand by assistance    Instrumental ADL's  Instrumental ADLs: (pt stood 8 1/2 minutes with SBA while doing functional tasks in room:  straightened areas, watered flowers, ordered food )     Toilet Transfers  Toilet - Technique: Ambulating(with rolling walker and SBA to/from bathroom)  Equipment Used: Standard toilet  Toilet Transfer: Supervision     Transfers  Sit to stand: Stand by assistance  Stand to sit: Stand by assistance           Cognition  Overall Cognitive Status: WNL          Type of ROM/Therapeutic Exercise  Type of ROM/Therapeutic Exercise: AROM  Comment: while seated, pt demonstrated 5-10 reps:  wrist AROM, finger flex/ext with abduction, internal/external rotation-OT educated pt in supination/pronation, shoulder abduction/adduction, and horizontal abd/add                    Plan   Plan  Times per week: 2-5x  Times per day: Daily  Current Treatment Recommendations: Safety Education & Training, Patient/Caregiver Education & Training, Self-Care / ADL, Functional Mobility Training, Endurance Training  G-Code     OutComes Score                                                  AM-PAC Score        AM-[facility] Inpatient Daily Activity Raw Score: 22 (03/04/20 1459)  AM-PAC Inpatient ADL T-Scale Score : 47.1 (03/04/20 1459)  ADL Inpatient CMS 0-100% Score: 25.8 (03/04/20 1459)  ADL Inpatient CMS G-Code Modifier : Avani Solano (03/04/20 1459)    Goals  Short term goals  Time Frame for Short term goals: by discharge   Short term goal 1: supervision toilet transfer- 3/4 goal met, updated goal:  pt to transfer to commode indep  Short term goal 2: supervision LB dressing- met for socks, ongoing for pants-3/4 goal not addressed  Short term goal 3: tolerate standing 10 minutes for ADLs/mobility for ADLs- 3/4 goal not met  Short term goal 4: new goal 3/4-pt to demonstrate RUE AROM exerc indep  Patient Goals   Patient goals : return home       Therapy Time   Individual Concurrent Group Co-treatment   Time In 1403         Time Out 1454         Minutes 51              Timed Code Treatment Minutes:  51    Total Treatment Minutes:  51    If patient is d/c prior to next treatment session, this note will serve as the discharge summary  MARYAN Knowles/

## 2020-03-04 NOTE — PROGRESS NOTES
Patient alert and oriented x4. VSS. No complaints of pain. Redness to R arm. R arm wrapped. Neuro checks WNL. Tolerating ambulation well. Urinating adequately. Tolerating diet. Fall precautions in place, will continue to monitor.

## 2020-03-04 NOTE — PLAN OF CARE
Problem: Falls - Risk of:  Goal: Will remain free from falls  Description  Patient in chair with alarm and nonskid socks on, Call light, belongings, and bedside table within reach. Patient educated on using call light and instructed to call out for assistance when getting up, patient verbalized understanding. Patient calls out approprietly and remains free of falls.     3/4/2020 1338 by Tr Lyn RN  Outcome: Ongoing

## 2020-03-04 NOTE — PROGRESS NOTES
full range of motion. No jugular venous distention. Trachea midline. Respiratory:  Normal respiratory effort. Clear to auscultation, bilaterally without Rales/Wheezes/Rhonchi. Cardiovascular: Regular rate and rhythm with normal S1/S2 without murmurs, rubs or gallops. Abdomen: Soft, non-tender, non-distended with normal bowel sounds. Musculoskeletal: Right upper extremity covered with Ace wraps as per ID recommendation  Neurologic:  Neurovascularly intact without any focal sensory/motor deficits. Cranial nerves: II-XII intact, grossly non-focal.  Psychiatric: Alert and oriented, thought content appropriate, normal insight  Capillary Refill: Brisk,< 3 seconds   Peripheral Pulses: +2 palpable, equal bilaterally       Labs:   Recent Labs     03/02/20 0542 03/03/20 0604 03/04/20  0528   WBC 12.1* 11.1* 8.1   HGB 10.3* 9.8* 10.0*   HCT 30.9* 29.1* 29.4*    209 225     Recent Labs     03/02/20 0542 03/03/20 0604 03/04/20  0528    137 134*   K 3.8 4.2 4.0    101 101   CO2 22 21 20*   BUN 9 12 14   CREATININE 0.8 0.9 0.9   CALCIUM 9.6 9.2 9.4     No results for input(s): AST, ALT, BILIDIR, BILITOT, ALKPHOS in the last 72 hours. No results for input(s): INR in the last 72 hours. No results for input(s): Keturah Bruno in the last 72 hours. Urinalysis:      Lab Results   Component Value Date    NITRU Negative 02/27/2020    WBCUA None seen 02/27/2020    RBCUA None seen 02/27/2020    BLOODU Negative 02/27/2020    SPECGRAV >=1.030 02/27/2020    GLUCOSEU Negative 02/27/2020       Radiology:  CT HUMERUS RIGHT WO CONTRAST   Final Result   1. Subcutaneous edema is identified about the humerus and forearm. The findings are in keeping with the clinical history of cellulitis. The findings are similar in comparison to the prior study when allowing for differences in technique. No abscess or    bursal fluid collection is identified.       VL Extremity Venous Right   Final Result      CT HUMERUS RIGHT WO CONTRAST   Final Result      Extensive subcutis space edema/inflammation with overlying skin thickening. No abscess identified              Assessment/Plan:    Active Hospital Problems    Diagnosis Date Noted    Right arm cellulitis [L03.113] 03/03/2020    Leukocytosis [D72.829] 03/03/2020    Fever [R50.9] 03/03/2020    Sepsis ([address] [A41.9] 02/26/2020     #Sepsis secondary to right upper extremity cellulitis. Repeat CT showed no abscess formation. ID consult reviewed and appreciated recommended compression right arm covered open area with Adaptic/gauze, wrapped in Kerlix/Ace  Continue Unasyn for now. Anticipate home on p.o. antibiotics. #KENAN likely secondary to Bactrim. Resolved    #Multiple sclerosis with possible movement disorder. Follow-up outpatient with neurology. #Acute metabolic encephalopathy resolved. DVT Prophylaxis: Heparin  Diet: DIET LOW SODIUM 2 GM;  Code Status: Full Code    PT/OT Eval Status: Baseline    Dispo -final ID recommendation. Home in 24 to 48 hours.   Doreen Winston MD

## 2020-03-04 NOTE — DISCHARGE INSTR - COC
Continuity of Care Form    Patient Name: Paige Vines   :  1965  MRN:  [id number]    Admit date:  2020  Discharge date:  ***    Code Status Order: Full Code   Advance Directives:   Advance Care Flowsheet Documentation     Date/Time Healthcare Directive Type of Healthcare Directive Copy in [address] Agent's Name Healthcare Agent's Phone Number    20 1716  No, patient does not have an advance directive for healthcare treatment -- -- -- -- --          Admitting Physician:  No admitting provider for patient encounter.   PCP: Eugenio Jackson DO    Discharging Nurse: [facility] Unit/Room#: 7247/4306-16  Discharging Unit Phone Number: ***    Emergency Contact:   Extended Emergency Contact Information  Primary Emergency Contact: Rangel [address] [place] [address] Phone: [phone]  Relation: Spouse    Past Surgical History:  Past Surgical History:   Procedure Laterality Date    BLADDER SUSPENSION      BREAST SURGERY      rt breast lumpectomy-atypical    COLONOSCOPY  3/13/2013    Dr. Lynn Vernon    partial     OTHER SURGICAL HISTORY Bilateral 2013    Incision and Drainage of abcess lower lip    OTHER SURGICAL HISTORY N/A 2016    OPEN REPAIR OF VENTRAL HERNIA             SALPINGO-OOPHORECTOMY  2013    SKIN BIOPSY         Immunization History:   Immunization History   Administered Date(s) Administered    Influenza 10/25/2011    Influenza Virus Vaccine 10/08/2012, 2013, 2014, 2015, 10/19/2018    Influenza, MDCK Quadv, IM, PF (Flucelvax 4 yrs and older) 10/03/2017    Pneumococcal Polysaccharide ([id number]) 10/15/2013    Tdap (Boostrix, Adacel) 10/15/2013       Active Problems:  Patient Active Problem List   Diagnosis Code    Urinary incontinence, mixed N39.46    Multiple sclerosis ([facility] [address] G35    Dyslipidemia E78.5    Chronic {Esignature:[id number]}    PHYSICIAN SECTION    Prognosis: Good    Condition at Discharge: Stable    Rehab Potential (if transferring to Rehab): {Prognosis:[id number]}    Recommended Labs or Other Treatments After Discharge:   Compression R arm - covered open area with adaptic / gauze, wrapped in Kerlix / ACE  INFUSION ORDERS:  Ceftriaxone 2 gm iv daily through 3/13  - First dose given in hospital  - Midline ine maintenance  - Disposition / date discharge - home   - Check CBC w diff, CMP, ESR, CRP every Mon or Tue - FAX result to [phone]  - Call with antibiotic / infusion issues, [phone]  - No f/u in outpatient ID office necessary    Physician Certification: I certify the above information and transfer of Nir Damon  is necessary for the continuing treatment of the diagnosis listed and that she requires Home Care for less 30 days.      Update Admission H&P: No change in H&P    PHYSICIAN SIGNATURE:  Electronically signed by Johnna Chisholm MD on 3/5/20 at 9:15 AM

## 2020-03-04 NOTE — PROGRESS NOTES
Physical Therapy  Facility/Department: [facility] 5T ORTHO/NEURO  Daily Treatment Note  NAME: Brittani White  : 1965  MRN: [id number]    Date of Service: 3/4/2020    Discharge Recommendations:  Brittani White scored a 18/24 on the AM-PAC short mobility form. Current research shows that an AM-PAC score of 18 or greater is typically associated with a discharge to the patient's home setting. Based on the patients AM-PAC score and their current functional mobility deficits, it is recommended that the patient have 2-3 sessions per week of Physical Therapy at d/c to increase the patients independence. PT Equipment Recommendations  Equipment Needed: Yes  Mobility Devices: Patrisha Bettina: Rolling    Assessment   Body structures, Functions, Activity limitations: Decreased functional mobility ; Decreased balance;Decreased endurance  Assessment: Pt participated in functional txf training, gait training on level surfaces & LE therex. She continues to demo good carry over of therex, requiring no VCs for technique. Dec'd ambulation distances this date 2/2 fatigue & periods of lightheadedness. PT will continue to asses,  tx & progress pt as appropriate. Treatment Diagnosis: Decreased functional mobility   Prognosis: Good  PT Education: PT Role;General Safety  Patient Education: Pt v.u   Barriers to Learning: none  REQUIRES PT FOLLOW UP: Yes  Activity Tolerance  Activity Tolerance: Patient limited by fatigue     Patient Diagnosis(es): There were no encounter diagnoses.      has a past medical history of Adenomatous colon polyp, Anxiety, Asthma, Bilateral kidney stones, Cellulitis, lip, Cervical spinal stenosis, Cholelithiasis, Clotting disorder ([address] Crohn's disease ([address] DDD (degenerative disc disease), cervical, DDD (degenerative disc disease), lumbar, Depression, DVT, lower extremity ([address] Fibrocystic breast, GERD (gastroesophageal reflux disease), Hormone disorder, Hx MRSA infection, Hyperlipidemia, Hypertension, Insomnia, Migraine, MS (multiple sclerosis) ([facility] [address] Multinodular goiter, Nausea & vomiting, Osteoarthritis, Polyp of colon, adenomatous, PONV (postoperative nausea and vomiting), Rash, Right leg DVT ([facility] [address] Sleep apnea, Spondylolisthesis of lumbar region, Thyroid nodule, Ureteral stone, Ureterolithiasis, Urinary incontinence, Venous insufficiency, and Weakness of left arm. has a past surgical history that includes bladder suspension (1995); Hysterectomy (1995); Breast surgery (2000); Colonoscopy (3/13/2013); skin biopsy; Salpingo-oophorectomy (4/12/2013); other surgical history (Bilateral, 06/14/2013); Diagnostic Cardiac Cath Lab Procedure; and other surgical history (N/A, 08/11/2016). Restrictions  Restrictions/Precautions  Required Braces or Orthoses?: No  Position Activity Restriction  Other position/activity restrictions: up as tolerated     Subjective   General  Chart Reviewed: Yes  Additional Pertinent Hx: 62 yof admitted for treatment of sepsis. Patient was recently treated at [facility] ED on 2/24 for possible cellulitis of R posterior arm with Bactrim/Keflex. On 2/26 patient presented to [facility] ED with fever and confusion. CT head unremarkable, but Xray chest revealed possible pneumonia. Response To Previous Treatment: Patient with no complaints from previous session. Family / Caregiver Present: No  Referring Practitioner: MD Vernon  Subjective  Subjective: Pt agreeable to PT tx this date.    General Comment  Comments: Pt resting in chair upon PT arrival.   Pain Screening  Patient Currently in Pain: No  Vital Signs  Patient Currently in Pain: No       Orientation  Orientation  Overall Orientation Status: Within Normal Limits     Cognition   Cognition  Overall Cognitive Status: WFL     Objective      Transfers  Sit to Stand: Supervision(up to RW. )  Stand to sit: Supervision     Ambulation  Ambulation?: Yes  WB Status: n/a   More Ambulation?: No  Ambulation 1  Surface: level tile  Device: Rolling

## 2020-03-04 NOTE — PROGRESS NOTES
Vancomycin has been discontinued by the infectious disease team. Pharmacy will sign off from management at this time. Please re-consult pharmacy if further management is warranted. Thank you!     Irish Brower, PharmD, [address] [facility]: [phone]  [address] [place] [address]: [phone]

## 2020-03-05 VITALS
HEIGHT: 64 IN | DIASTOLIC BLOOD PRESSURE: 92 MMHG | HEART RATE: 89 BPM | TEMPERATURE: 97.9 F | OXYGEN SATURATION: 96 % | WEIGHT: 236.33 LBS | RESPIRATION RATE: 18 BRPM | BODY MASS INDEX: 40.35 KG/M2 | SYSTOLIC BLOOD PRESSURE: 146 MMHG

## 2020-03-05 LAB
ANION GAP SERPL CALCULATED.3IONS-SCNC: 12 MMOL/L (ref 3–16)
BASOPHILS ABSOLUTE: 0 K/UL (ref 0–0.2)
BASOPHILS RELATIVE PERCENT: 0 %
BUN BLDV-MCNC: 12 MG/DL (ref 7–20)
CALCIUM SERPL-MCNC: 9.5 MG/DL (ref 8.3–10.6)
CHLORIDE BLD-SCNC: 101 MMOL/L (ref 99–110)
CO2: 24 MMOL/L (ref 21–32)
CREAT SERPL-MCNC: 0.8 MG/DL (ref 0.6–1.1)
EOSINOPHILS ABSOLUTE: 0.2 K/UL (ref 0–0.6)
EOSINOPHILS RELATIVE PERCENT: 2 %
GFR AFRICAN AMERICAN: >60
GFR NON-AFRICAN AMERICAN: >60
GLUCOSE BLD-MCNC: 97 MG/DL (ref 70–99)
HCT VFR BLD CALC: 30.6 % (ref 36–48)
HEMOGLOBIN: 10 G/DL (ref 12–16)
LYMPHOCYTES ABSOLUTE: 1.4 K/UL (ref 1–5.1)
LYMPHOCYTES RELATIVE PERCENT: 18 %
MCH RBC QN AUTO: 28.8 PG (ref 26–34)
MCHC RBC AUTO-ENTMCNC: 32.7 G/DL (ref 31–36)
MCV RBC AUTO: 88.2 FL (ref 80–100)
MONOCYTES ABSOLUTE: 0.4 K/UL (ref 0–1.3)
MONOCYTES RELATIVE PERCENT: 5 %
MYELOCYTE PERCENT: 1 %
NEUTROPHILS ABSOLUTE: 5.7 K/UL (ref 1.7–7.7)
NEUTROPHILS RELATIVE PERCENT: 74 %
PDW BLD-RTO: 13.9 % (ref 12.4–15.4)
PLATELET # BLD: 314 K/UL (ref 135–450)
PMV BLD AUTO: 7.6 FL (ref 5–10.5)
POTASSIUM REFLEX MAGNESIUM: 4.6 MMOL/L (ref 3.5–5.1)
RBC # BLD: 3.46 M/UL (ref 4–5.2)
SODIUM BLD-SCNC: 137 MMOL/L (ref 136–145)
WBC # BLD: 7.6 K/UL (ref 4–11)

## 2020-03-05 PROCEDURE — 97116 GAIT TRAINING THERAPY: CPT

## 2020-03-05 PROCEDURE — 2580000003 HC RX 258

## 2020-03-05 PROCEDURE — 05HD33Z INSERTION OF INFUSION DEVICE INTO RIGHT CEPHALIC VEIN, PERCUTANEOUS APPROACH: ICD-10-PCS | Performed by: INTERNAL MEDICINE

## 2020-03-05 PROCEDURE — 97110 THERAPEUTIC EXERCISES: CPT

## 2020-03-05 PROCEDURE — 6370000000 HC RX 637 (ALT 250 FOR IP): Performed by: INTERNAL MEDICINE

## 2020-03-05 PROCEDURE — 6360000002 HC RX W HCPCS: Performed by: INTERNAL MEDICINE

## 2020-03-05 PROCEDURE — 85025 COMPLETE CBC W/AUTO DIFF WBC: CPT

## 2020-03-05 PROCEDURE — 36415 COLL VENOUS BLD VENIPUNCTURE: CPT

## 2020-03-05 PROCEDURE — 94761 N-INVAS EAR/PLS OXIMETRY MLT: CPT

## 2020-03-05 PROCEDURE — 94640 AIRWAY INHALATION TREATMENT: CPT

## 2020-03-05 PROCEDURE — 36569 INSJ PICC 5 YR+ W/O IMAGING: CPT

## 2020-03-05 PROCEDURE — 6370000000 HC RX 637 (ALT 250 FOR IP): Performed by: FAMILY MEDICINE

## 2020-03-05 PROCEDURE — 2580000003 HC RX 258: Performed by: INTERNAL MEDICINE

## 2020-03-05 PROCEDURE — 80048 BASIC METABOLIC PNL TOTAL CA: CPT

## 2020-03-05 PROCEDURE — C1751 CATH, INF, PER/CENT/MIDLINE: HCPCS

## 2020-03-05 PROCEDURE — 97535 SELF CARE MNGMENT TRAINING: CPT

## 2020-03-05 PROCEDURE — 99232 SBSQ HOSP IP/OBS MODERATE 35: CPT | Performed by: INTERNAL MEDICINE

## 2020-03-05 RX ORDER — LIDOCAINE HYDROCHLORIDE 10 MG/ML
5 INJECTION, SOLUTION EPIDURAL; INFILTRATION; INTRACAUDAL; PERINEURAL ONCE
Status: DISCONTINUED | OUTPATIENT
Start: 2020-03-05 | End: 2020-03-05 | Stop reason: HOSPADM

## 2020-03-05 RX ORDER — SODIUM CHLORIDE 0.9 % (FLUSH) 0.9 %
10 SYRINGE (ML) INJECTION PRN
Status: DISCONTINUED | OUTPATIENT
Start: 2020-03-05 | End: 2020-03-05 | Stop reason: HOSPADM

## 2020-03-05 RX ORDER — SODIUM CHLORIDE 9 MG/ML
INJECTION, SOLUTION INTRAVENOUS
Status: COMPLETED
Start: 2020-03-05 | End: 2020-03-05

## 2020-03-05 RX ORDER — CEFTRIAXONE 1 G/1
2 INJECTION, POWDER, FOR SOLUTION INTRAMUSCULAR; INTRAVENOUS DAILY
Qty: 16 G | Refills: 0
Start: 2020-03-05 | End: 2020-03-13

## 2020-03-05 RX ORDER — SODIUM CHLORIDE 0.9 % (FLUSH) 0.9 %
10 SYRINGE (ML) INJECTION EVERY 12 HOURS SCHEDULED
Status: DISCONTINUED | OUTPATIENT
Start: 2020-03-05 | End: 2020-03-05 | Stop reason: HOSPADM

## 2020-03-05 RX ADMIN — ACETAMINOPHEN 650 MG: 325 TABLET ORAL at 13:26

## 2020-03-05 RX ADMIN — AMPICILLIN SODIUM AND SULBACTAM SODIUM 3 G: 2; 1 INJECTION, POWDER, FOR SOLUTION INTRAMUSCULAR; INTRAVENOUS at 05:50

## 2020-03-05 RX ADMIN — CEFTRIAXONE SODIUM 2 G: 2 INJECTION, POWDER, FOR SOLUTION INTRAMUSCULAR; INTRAVENOUS at 13:20

## 2020-03-05 RX ADMIN — Medication 400 MG: at 08:52

## 2020-03-05 RX ADMIN — SODIUM CHLORIDE: 900 INJECTION, SOLUTION INTRAVENOUS at 13:33

## 2020-03-05 RX ADMIN — ARFORMOTEROL TARTRATE 15 MCG: 15 SOLUTION RESPIRATORY (INHALATION) at 08:22

## 2020-03-05 RX ADMIN — DULOXETINE HYDROCHLORIDE 60 MG: 60 CAPSULE, DELAYED RELEASE ORAL at 08:52

## 2020-03-05 RX ADMIN — LOSARTAN POTASSIUM 50 MG: 25 TABLET ORAL at 08:52

## 2020-03-05 RX ADMIN — MODAFINIL 200 MG: 200 TABLET ORAL at 08:51

## 2020-03-05 RX ADMIN — BUDESONIDE 500 MCG: 0.5 SUSPENSION RESPIRATORY (INHALATION) at 08:22

## 2020-03-05 RX ADMIN — Medication 10 ML: at 08:53

## 2020-03-05 ASSESSMENT — PAIN DESCRIPTION - ORIENTATION
ORIENTATION: RIGHT
ORIENTATION: RIGHT

## 2020-03-05 ASSESSMENT — PAIN DESCRIPTION - FREQUENCY
FREQUENCY: CONTINUOUS
FREQUENCY: CONTINUOUS

## 2020-03-05 ASSESSMENT — PAIN SCALES - GENERAL
PAINLEVEL_OUTOF10: 3
PAINLEVEL_OUTOF10: 4
PAINLEVEL_OUTOF10: 2

## 2020-03-05 ASSESSMENT — PAIN DESCRIPTION - DESCRIPTORS
DESCRIPTORS: ACHING
DESCRIPTORS: ACHING

## 2020-03-05 ASSESSMENT — PAIN DESCRIPTION - LOCATION
LOCATION: ARM
LOCATION: ARM

## 2020-03-05 ASSESSMENT — PAIN DESCRIPTION - PAIN TYPE
TYPE: ACUTE PAIN
TYPE: ACUTE PAIN

## 2020-03-05 ASSESSMENT — PAIN DESCRIPTION - PROGRESSION: CLINICAL_PROGRESSION: NOT CHANGED

## 2020-03-05 ASSESSMENT — PAIN DESCRIPTION - ONSET: ONSET: ON-GOING

## 2020-03-05 NOTE — PROGRESS NOTES
General: Swelling present. Comments: Erythema, swelling in RUE. Warm to touch   Skin:     General: Skin is warm and dry. Capillary Refill: Capillary refill takes less than 2 seconds. Neurological:      General: No focal deficit present. Mental Status: She is alert and oriented to person, place, and time. Cranial Nerves: No cranial nerve deficit. LABS:    CBC:   Recent Labs     03/03/20 0604 03/04/20 0528 03/05/20 0528   WBC 11.1* 8.1 7.6   HGB 9.8* 10.0* 10.0*   HCT 29.1* 29.4* 30.6*    225 314   MCV 87.3 86.3 88.2     Renal:    Recent Labs     03/03/20 0604 03/04/20 0528 03/05/20 0528    134* 137   K 4.2 4.0 4.6    101 101   CO2 21 20* 24   BUN 12 14 12   CREATININE 0.9 0.9 0.8   GLUCOSE 93 103* 97   CALCIUM 9.2 9.4 9.5   ANIONGAP 15 13 12     Hepatic: No results for input(s): AST, ALT, BILITOT, BILIDIR, PROT, LABALBU, ALKPHOS in the last 72 hours. Troponin: No results for input(s): TROPONINI in the last 72 hours. BNP: No results for input(s): BNP in the last 72 hours. Lipids: No results for input(s): CHOL, HDL in the last 72 hours. Invalid input(s): LDLCALCU, TRIGLYCERIDE  ABGs:  No results for input(s): PHART, CCI8OWL, PO2ART, JDO9XKY, BEART, THGBART, Q0SEDIQZ, ULD8GHC in the last 72 hours. INR: No results for input(s): INR in the last 72 hours. Lactate: No results for input(s): LACTATE in the last 72 hours. Cultures:  -----------------------------------------------------------------  RAD:   CT HUMERUS RIGHT WO CONTRAST   Final Result   1. Subcutaneous edema is identified about the humerus and forearm. The findings are in keeping with the clinical history of cellulitis. The findings are similar in comparison to the prior study when allowing for differences in technique. No abscess or    bursal fluid collection is identified.       VL Extremity Venous Right   Final Result      CT HUMERUS RIGHT WO CONTRAST   Final Result      Extensive subcutis space edema/inflammation with overlying skin thickening. No abscess identified            Assessment/Plan: KENAN  Creatinine 3.7 on admission. Improved with IVF with good urine output. Still above prior baseline. Likely prerenal KENAN. - Cr better   - d/c IVF   - Avoid nephrotoxins  - no  bactrim    RUE cellulitis  -On abx   - ID following       Chronic HTN  Hypotension on presentation, improved with IVF. Continue to monitor.     Joann Valentin MD

## 2020-03-05 NOTE — PROGRESS NOTES
Patient A&Ox4, VSS. RUE dressing and ace wrap changed, remains clean dry, and intact. Neuro checks at baseline. Pain minimal, controlled with PO tylenol. Patient anticipating discharge this afternoon pending daily abx dose. Will continue to monitor.

## 2020-03-05 NOTE — PROGRESS NOTES
Patient is alert and oriented x 4. Calls out appropriately. Denies numbness or tingling. RUE is wrapped in an ACE wrap. ACE is CD&I. No complaints of pain. Receiving IV antibiotics. Has remained afebrile so far this shift. Voiding adequately. Denies dizziness. Vital signs stable. Will continue to monitor and assess.

## 2020-03-05 NOTE — PROGRESS NOTES
Physical Therapy  Daily Treatment Note    Discharge Recommendations: Nir Damon scored a 18/24 on the AM-PAC short mobility form. Current research shows that an AM-PAC score of 18 or greater is typically associated with a discharge to the patient's home setting. Based on the patients AM-PAC score and their current functional mobility deficits, it is recommended that the patient have 2-3 sessions per week of Physical Therapy at d/c to increase the patients independence. Assessment:  Steady gait with wheeled walker. Anticipate pt will do well at home with assist of  and home care. Pt was already issued a wheeled walker. Equipment Needs: Pt was already issued a wheeled walker    Chart Reviewed: Yes     Other position/activity restrictions: up as tolerated   Additional Pertinent Hx: 62 yof admitted for treatment of sepsis. Patient was recently treated at [facility] ED on 2/24 for possible cellulitis of R posterior arm with Bactrim/Keflex. On 2/26 patient presented to [facility] ED with fever and confusion. CT head unremarkable, but Xray chest revealed possible pneumonia. WB Status: n/a     Diagnosis: Sepsis   Treatment Diagnosis: Decreased functional mobility     Subjective: Pt in chair initially.  present. Pt anticipates going home later today. Agreeable to working with PT. States she already received her wheeled walker. No stairs at home. Pain: Some discomfort R arm. \"It'll help to get this IV out. \"    Objective:    Transfers  Sit to stand: Supervision from chair  Stand to sit: Supervision into chair    Ambulation  Assistance Level: SBA  Assistive device: Wheeled walker  Distance: ~240 ft  Quality of gait: Step-through pattern; steady with walker    Exercises  15 reps B LE seated ankle pumps, LAQ, hip abd/add, hip flexion with occasional rest breaks. Patient Education  Calling for assist with needs. Expressed understanding. Safety Devices  Pt left with needs in reach.  In chair (reclined)

## 2020-03-05 NOTE — DISCHARGE SUMMARY
Hospital Medicine Discharge Summary    Patient ID: John Matthew      Patient's PCP: Jerome Diaz DO    Admit Date: 2/26/2020     Discharge Date:   3/5/2020    Admitting Physician: No admitting provider for patient encounter. Discharge Physician: Beryle Freed, MD     Discharge Diagnoses: Active Hospital Problems    Diagnosis Date Noted    Sepsis ([facility] [address] [A41.9] 02/26/2020     Priority: High    Right arm cellulitis [L03.113] 03/03/2020     Priority: Medium    Leukocytosis [D72.829] 03/03/2020    Fever [R50.9] 03/03/2020       The patient was seen and examined on day of discharge and this discharge summary is in conjunction with any daily progress note from day of discharge. Hospital Course: This is a 24-year-old female presented from [facility] ER for worsening of swelling in the right arm. She was discharged on 24th from ED with Bactrim. On arrival patient was found to have elevated creatinine. Patient was a spiking temperature despite of being on IV vancomycin and cefepime. ID was consulted recommended Unasyn. Patient was discharged home on IV Rocephin as per ID recommendations. No febrile events in last 24 hours. Denies any nausea, vomiting, fever, chills, arm pain. Physical Exam Performed:     /75   Pulse 83   Temp 98.3 °F (36.8 °C) (Oral)   Resp 18   Ht 5' 4\" (1.626 m)   Wt 236 lb 5.3 oz (107.2 kg)   SpO2 94%   BMI 40.57 kg/m²       General appearance:  No apparent distress, appears stated age and cooperative. HEENT:  Normal cephalic, atraumatic without obvious deformity. Pupils equal, round, and reactive to light. Extra ocular muscles intact. Conjunctivae/corneas clear. Neck: Supple, with full range of motion. No jugular venous distention. Trachea midline. Respiratory:  Normal respiratory effort. Clear to auscultation, bilaterally without Rales/Wheezes/Rhonchi.   Cardiovascular:  Regular rate and rhythm with normal S1/S2 without murmurs, rubs or gallops. Abdomen: Soft, non-tender, non-distended with normal bowel sounds. Musculoskeletal: Right arm covered with Ace wraps. Pictures from ID note reviewed. Skin: Skin color, texture, turgor normal.  No rashes or lesions. Neurologic:  Neurovascularly intact without any focal sensory/motor deficits. Cranial nerves: II-XII intact, grossly non-focal.  Psychiatric:  Alert and oriented, thought content appropriate, normal insight  Capillary Refill: Brisk,< 3 seconds   Peripheral Pulses: +2 palpable, equal bilaterally       Labs: For convenience and continuity at follow-up the following most recent labs are provided:      CBC:    Lab Results   Component Value Date    WBC 7.6 03/05/2020    HGB 10.0 03/05/2020    HCT 30.6 03/05/2020     03/05/2020       Renal:    Lab Results   Component Value Date     03/05/2020    K 4.6 03/05/2020     03/05/2020    CO2 24 03/05/2020    BUN 12 03/05/2020    CREATININE 0.8 03/05/2020    CALCIUM 9.5 03/05/2020    PHOS 4.0 10/05/2017         Significant Diagnostic Studies    Radiology:   CT HUMERUS RIGHT WO CONTRAST   Final Result   1. Subcutaneous edema is identified about the humerus and forearm. The findings are in keeping with the clinical history of cellulitis. The findings are similar in comparison to the prior study when allowing for differences in technique. No abscess or    bursal fluid collection is identified. VL Extremity Venous Right   Final Result      CT HUMERUS RIGHT WO CONTRAST   Final Result      Extensive subcutis space edema/inflammation with overlying skin thickening.  No abscess identified             Consults:     PHARMACY TO DOSE VANCOMYCIN  IP CONSULT TO NEPHROLOGY  IP CONSULT TO INFECTIOUS DISEASES    Disposition: Discharge home with home health    Condition at Discharge: Stable    Discharge Instructions/Follow-up: PCP in 1 week    Code Status:  Full Code     Activity: activity as tolerated    Diet: regular diet      Discharge

## 2020-03-05 NOTE — PROGRESS NOTES
Occupational Therapy  Facility/Department: [facility] 5T ORTHO/NEURO  Daily Treatment Note  NAME: Brittani White  : 1965  MRN: [id number]    Date of Service: 3/5/2020    Discharge Recommendations:  Brittani White scored a 23/24 on the AM-PAC ADL Inpatient form. At this time, no further OT is recommended upon discharge due to pt near baseline has assist from . Recommend patient returns to prior setting with prior services. OT Equipment Recommendations  Other: may benefit from shower chair, pending progress    Assessment   Performance deficits / Impairments: Decreased functional mobility ; Decreased ADL status; Decreased endurance  Assessment: Pt progressing with therapy, Mod I for functional mobility/transfers. Pt completed RUE ROM with vcs. Pt to d/c home, no further OT. Treatment Diagnosis: Impaired ADLs, mobility and endurance d/t sepsis  Prognosis: Good  OT Education: Home Exercise Program  Patient Education: pt demonstrated understanding with vcs  REQUIRES OT FOLLOW UP: No  Activity Tolerance  Activity Tolerance: Patient Tolerated treatment well  Safety Devices  Safety Devices in place: Yes  Type of devices: Call light within reach; Chair alarm in place; Left in chair;Nurse notified; All fall risk precautions in place         Patient Diagnosis(es): There were no encounter diagnoses.       has a past medical history of Adenomatous colon polyp, Anxiety, Asthma, Bilateral kidney stones, Cellulitis, lip, Cervical spinal stenosis, Cholelithiasis, Clotting disorder ([address] Crohn's disease ([address] DDD (degenerative disc disease), cervical, DDD (degenerative disc disease), lumbar, Depression, DVT, lower extremity ([address] Fibrocystic breast, GERD (gastroesophageal reflux disease), Hormone disorder, Hx MRSA infection, Hyperlipidemia, Hypertension, Insomnia, Migraine, MS (multiple sclerosis) ([address] Multinodular goiter, Nausea & vomiting, Osteoarthritis, Polyp of colon, adenomatous, PONV (postoperative nausea and sets, 10 wrisst flexion/etension x 2 sets and 10 horizontal AB/Adductors x 3 sets. Call light in reach, chair alarm on and RN present.    Pain Assessment  Pain Level: 4  Pain Type: Acute pain  Pain Location: Arm  Pain Orientation: Right  Pain Descriptors: Aching  Pain Frequency: Continuous  Pre Treatment Pain Screening  Intervention List: Patient able to continue with treatment;Nurse called to administer meds  Vital Signs  Patient Currently in Pain: Yes   Orientation  Orientation  Overall Orientation Status: Within Normal Limits  Objective    ADL  Grooming: Modified independent   UE Dressing: Independent  LE Dressing: Modified independent   Toileting: Modified independent         Balance  Sitting Balance: Independent  Standing Balance: Modified independent   Standing Balance  Time: ~9 min  Activity: to/from bathroom, in stance to groom, toilet transfer, LB clothing mgmt  Functional Mobility  Functional - Mobility Device: Rolling Walker  Activity: To/from bathroom  Assist Level: Modified independent   Toilet Transfers  Toilet - Technique: Ambulating  Equipment Used: Standard toilet  Toilet Transfer: Modified independent  Toilet Transfers Comments: with use of grab bar  Bed mobility  Supine to Sit: Modified independent(use of bed rail)  Scooting: Modified independent  Transfers  Sit to stand: Modified independent  Stand to sit: Modified independent                       Cognition  Overall Cognitive Status: WNL                                         Plan   Plan  Times per week: 2-5x  Times per day: Daily  Current Treatment Recommendations: Safety Education & Training, Patient/Caregiver Education & Training, Self-Care / ADL, Functional Mobility Training, Endurance Training  G-Code     OutComes Score                                                  AM-PAC Score        AM-[facility] Inpatient Daily Activity Raw Score: 23 (03/05/20 1334)  AM-PAC Inpatient ADL T-Scale Score : 51.12 (03/05/20 1334)  ADL Inpatient CMS 0-100% Score:

## 2020-03-05 NOTE — PROGRESS NOTES
Patient discharged with belongings and follow up instructions. PIVs removed without complications, midline flushed and alcohol capped. Patient transported to main entrance via wheelchair, spouse at [facility].

## 2020-03-05 NOTE — PROCEDURES
MIDLINE   PROCEDURE   NOTE      Chart reviewed for allergies, diagnosis, labs, known contraindications, reason for line placement and planned length of treatment. Insertion procedure discussed with patient/family member. Informed consent not required for Midline placement. Three patient identifiers - Patient name,   and MRN -  completed &  confirmed verbally. Hat, mask and eye shield donned. Midline site scrubbed with Chloraprep  One-Step applicator  for 30 seconds x 1. Hand Hygiene  performed with 3% Chlorhexidine surgical scrub x1 min prior to  Sterile gloves,   sterile gown being donned. Patient draped using maximal sterile barrier technique ( head to toe ). Midline site scrubbed a 2nd time with Chloraprep One-Step applicator x 30 sec. Vein located by CellTran and site marked with sterile pen. Number of insertion attempts 2 . Midline inserted via  Cephalic   vein. # 22 g, 8 cm placed. Positive brisk blood return obtained Midline flushed with 10 mls  0.9% Sterile Sodium Chloride. Midline flushes easily with no resistance. Neutral pressure valve placed on all lumens followed by Alcohol Swab Caps on end of each. Skin prep applied to site. Bio-patch in place. Catheter secured with non-sutured locking device per hospital protocol with 0 cm of catheter showing beneath dressing. Sterile Tegaderm applied over Midline site. Sterile field maintained during procedure. Pt. Response to procedure tolerated well  Appearance of site: Clean dry and intact without bleeding or edema. All edges of Tegaderm occlusive. Site marked with date and initials of RN placing line. Top 2 side rails in up position, call button in reach, RN notified of all of the above.

## 2020-03-05 NOTE — PROGRESS NOTES
ID Follow-up NOTE    CC:   R arm cellulitis, fever   Antibiotics: Unasyn    Admit Date: 2020  Hospital Day: 9    Subjective:     Patient reports R arm is less tight / heavy with ACE wrap  No other complaint     Objective:     Afebrile (Tm 100.6 on 3/4)    Patient Vitals for the past 8 hrs:   BP Temp Temp src Pulse Resp SpO2   20 0959 (!) 147/78 98.2 °F (36.8 °C) Oral 85 18 92 %   20 0824 -- -- -- -- -- 94 %   20 0724 129/75 98.3 °F (36.8 °C) Oral 83 18 93 %   20 0357 112/70 98.5 °F (36.9 °C) Oral 94 19 91 %     I/O last 3 completed shifts: In: 840 [P.O.:840]  Out: 0   No intake/output data recorded. EXAM:  GENERAL: No apparent distress. HEENT: Membranes moist, no oral lesion  NECK:  Supple, no lymphadenopathy  LUNGS: Clear b/l, no rales, no dullness  CARDIAC: RRR, no murmur appreciated  ABD:  + BS, soft / NT  EXT:                R arm with Kerlix/ ACE.     Seen 3/4 - swelling, erythema, upper posterior arm with induration, epidermolysis (superficial skin breakdown), most indurated posterior tricep area, serous drainage, no fluctuance (see pictures below)  NEURO: No focal neurologic findings  PSYCH: Orientation, sensorium, mood normal  LINES:  Peripheral iv    On 3/4 --         Data Review:  Lab Results   Component Value Date    WBC 7.6 2020    HGB 10.0 (L) 2020    HCT 30.6 (L) 2020    MCV 88.2 2020     2020     Lab Results   Component Value Date    CREATININE 0.8 2020    BUN 12 2020     2020    K 4.6 2020     2020    CO2 24 2020       Hepatic Function Panel:   Lab Results   Component Value Date    ALKPHOS 75 2019    ALT 59 2019    AST 57 2019    PROT 7.6 2019    PROT 7.8 2013    BILITOT 0.3 2019    BILIDIR <0.2 10/02/2017    IBILI see below 10/02/2017    LABALBU 4.1 2019       Micro:   BC x 2 no growth      Imagin/29 R arm CT:  Subcutaneous edema necessary

## 2020-03-06 ENCOUNTER — TELEPHONE (OUTPATIENT)
Dept: INTERNAL MEDICINE CLINIC | Age: 55
End: 2020-03-06

## 2020-03-09 ENCOUNTER — TELEPHONE (OUTPATIENT)
Dept: INTERNAL MEDICINE CLINIC | Age: 55
End: 2020-03-09

## 2020-03-09 NOTE — TELEPHONE ENCOUNTER
[facility] Transitions Initial Follow Up Call    Outreach made within 2 business days of discharge: Yes    Patient: Ulises Benton Patient : 1965   MRN: [id number]  Reason for Admission: There are no discharge diagnoses documented for the most recent discharge. Discharge Date: 3/5/20       Spoke with: PT    Discharge department/facility: [facility] Interactive Patient Contact:  Was patient able to fill all prescriptions: Yes  Was patient instructed to bring all medications to the follow-up visit: Yes  Is patient taking all medications as directed in the discharge summary?  Yes  Does patient understand their discharge instructions: Yes  Does patient have questions or concerns that need addressed prior to 7-14 day follow up office visit: no    Scheduled appointment with PCP within 7-14 days    Follow Up  Future Appointments   Date Time Provider Tony Chavira   3/20/2020  2:30 PM Candace Green DO [facility]   2020  1:40 PM Hudson Pelayo MD [address]       Luann Valentine, [address] [place] [address]

## 2020-03-09 NOTE — TELEPHONE ENCOUNTER
TRANSITIONAL CARE    Patient was discharged from [facility]. on 03- having had Sepsis. She scheduled an Transitional Care Appointment on 03-. If this appointment is too far out, please provide an appointment date and time, as I scheduled her in the next available transitional care slot. Patient can be reached @ phone # provided should there be any questions.

## 2020-03-12 ENCOUNTER — TELEPHONE (OUTPATIENT)
Dept: INFECTIOUS DISEASES | Age: 55
End: 2020-03-12

## 2020-03-12 NOTE — TELEPHONE ENCOUNTER
Called pt -  Severe R arm cellulitis     Pt reports her arm is a lot better  Has 'knot', tender at site.     Will extended ceftriaxone 2 gm daily

## 2020-03-16 ENCOUNTER — OFFICE VISIT (OUTPATIENT)
Dept: INTERNAL MEDICINE CLINIC | Age: 55
End: 2020-03-16
Payer: COMMERCIAL

## 2020-03-16 ENCOUNTER — OFFICE VISIT (OUTPATIENT)
Dept: SURGERY | Age: 55
End: 2020-03-16
Payer: COMMERCIAL

## 2020-03-16 VITALS
HEART RATE: 70 BPM | SYSTOLIC BLOOD PRESSURE: 116 MMHG | RESPIRATION RATE: 12 BRPM | BODY MASS INDEX: 37.42 KG/M2 | TEMPERATURE: 98.7 F | DIASTOLIC BLOOD PRESSURE: 60 MMHG | WEIGHT: 218 LBS

## 2020-03-16 VITALS
TEMPERATURE: 97.4 F | SYSTOLIC BLOOD PRESSURE: 140 MMHG | WEIGHT: 224 LBS | DIASTOLIC BLOOD PRESSURE: 89 MMHG | BODY MASS INDEX: 38.24 KG/M2 | HEIGHT: 64 IN

## 2020-03-16 PROCEDURE — 99495 TRANSJ CARE MGMT MOD F2F 14D: CPT | Performed by: INTERNAL MEDICINE

## 2020-03-16 PROCEDURE — 1111F DSCHRG MED/CURRENT MED MERGE: CPT | Performed by: INTERNAL MEDICINE

## 2020-03-16 PROCEDURE — 99212 OFFICE O/P EST SF 10 MIN: CPT | Performed by: SURGERY

## 2020-03-16 NOTE — PATIENT INSTRUCTIONS
Stay well-hydrated. Try to be up and moving rather than keeping still on the couch. Call with any increased concerns.   I will be in touch with you with regards to discussion with infectious disease

## 2020-03-16 NOTE — PROGRESS NOTES
daily             fenofibrate 160 MG tablet  TAKE 1 TABLET EVERY DAY             gabapentin (NEURONTIN) 100 MG capsule  Take 100 mg by mouth 2 times daily. modafinil (PROVIGIL) 200 MG tablet  Take 200 mg by mouth daily. naproxen (NAPROSYN) 500 MG tablet  Take 1 tablet by mouth 2 times daily (with meals)             omeprazole (PRILOSEC) 40 MG delayed release capsule  TAKE 1 CAPSULE TWICE DAILY (NEED MD APPOINTMENT)             Peginterferon Beta-1a (PLEGRIDY) 125 MCG/0.5ML SOPN  Inject 125 mLs into the skin daily 1 shot every 2 weeks             propranolol (INDERAL LA) 80 MG extended release capsule  TAKE 1 CAPSULE EVERY DAY             SYMBICORT 160-4.5 MCG/ACT AERO  INHALE 2 PUFFS INTO THE LUNGS 2 TIMES DAILY             traZODone (DESYREL) 100 MG tablet  TAKE 1 TABLET EVERY NIGHT (GENERIC FOR DESYREL)                   Medications marked \"taking\" at this time  Medication Sig    modafinil (PROVIGIL) 200 MG tablet Take 200 mg by mouth daily.  gabapentin (NEURONTIN) 100 MG capsule Take 100 mg by mouth 2 times daily.     naproxen (NAPROSYN) 500 MG tablet Take 1 tablet by mouth 2 times daily (with meals)    cloNIDine (CATAPRES) 0.2 MG tablet TAKE 1 TABLET EVERY DAY    propranolol (INDERAL LA) 80 MG extended release capsule TAKE 1 CAPSULE EVERY DAY    traZODone (DESYREL) 100 MG tablet TAKE 1 TABLET EVERY NIGHT (GENERIC FOR DESYREL)    omeprazole (PRILOSEC) 40 MG delayed release capsule TAKE 1 CAPSULE TWICE DAILY (NEED MD APPOINTMENT)    SYMBICORT 160-4.5 MCG/ACT AERO INHALE 2 PUFFS INTO THE LUNGS 2 TIMES DAILY    fenofibrate 160 MG tablet TAKE 1 TABLET EVERY DAY    DULoxetine (CYMBALTA) 60 MG capsule Take 60 mg by mouth 2 times daily    Peginterferon Beta-1a (PLEGRIDY) 125 MCG/0.5ML SOPN Inject 125 mLs into the skin daily 1 shot every 2 weeks        Medications patient taking as of now reconciled against medications ordered at time of hospital discharge: Yes    Chief Complaint Patient presents with    Follow-Up from Hospital       History of Present illness - Follow up of Hospital diagnosis(es): Arm cellulitis, sepsis    Inpatient course: Discharge summary reviewed- see chart. This is a 78-year-old female with history of multiple sclerosis who initially presented on 2/26/2020 to the [facility] emergency department and was diagnosed with right arm cellulitis. Symptoms included right arm redness. However, she was seen in the emergency department at [facility] on 2/24/2020 with pain under her right arm. At that time she was noted to have mild swelling and tenderness with well-demarcated erythema of the upper arm. Renal function at that time was normal.  Patient was discharged on 2 antibiotics and sent home. On 2/26/2020 the  found the patient in the middle of the night with significant increased confusion, wandering, disorientation. She actually set off the alarm at the house. She was taken to [facility] ER where she was diagnosed with right upper arm cellulitis again with associated acute renal failure. Due to insurance restrictions she had to be transferred to [facility].  Patient was admitted and treated with aggressive IV fluid hydration and IV antibiotics. 2 CAT scans were obtained of the upper arm which showed no evidence of abscess but findings consistent with cellulitis. During her hospital stay she improved significantly. Renal function was normal upon discharge. She is now on Rocephin. Recently the infectious disease specialist who saw her in the hospital extended her treatment another week. Interval history/Current status: Overall since her return home she has felt much better. She still has swelling and discomfort involving the right upper arm. Her  states there has been some drainage from her skin. She denies any pus. She has had no further fever. She is urinating.   Her appetite is decreased but she is making herself eat

## 2020-03-17 ENCOUNTER — HOSPITAL ENCOUNTER (OUTPATIENT)
Age: 55
Setting detail: SPECIMEN
Discharge: HOME | End: 2020-03-17
Payer: COMMERCIAL

## 2020-03-17 LAB
A/G RATIO: 1 (ref 1.1–2.2)
ALBUMIN SERPL-MCNC: 3.6 G/DL (ref 3.4–5)
ALP BLD-CCNC: 64 U/L (ref 40–129)
ALT SERPL-CCNC: 16 U/L (ref 10–40)
ANION GAP SERPL CALCULATED.3IONS-SCNC: 12 MMOL/L (ref 3–16)
AST SERPL-CCNC: 19 U/L (ref 15–37)
BASOPHILS ABSOLUTE: 0.1 K/UL (ref 0–0.2)
BASOPHILS RELATIVE PERCENT: 4 %
BILIRUB SERPL-MCNC: <0.2 MG/DL (ref 0–1)
BUN BLDV-MCNC: 13 MG/DL (ref 7–20)
CALCIUM SERPL-MCNC: 9.2 MG/DL (ref 8.3–10.6)
CHLORIDE BLD-SCNC: 97 MMOL/L (ref 99–110)
CO2: 27 MMOL/L (ref 21–32)
CREAT SERPL-MCNC: 1.1 MG/DL (ref 0.6–1.1)
EOSINOPHILS ABSOLUTE: 0.1 K/UL (ref 0–0.6)
EOSINOPHILS RELATIVE PERCENT: 2.8 %
GFR AFRICAN AMERICAN: >60
GFR NON-AFRICAN AMERICAN: 51
GLOBULIN: 3.5 G/DL
GLUCOSE BLD-MCNC: 105 MG/DL (ref 70–99)
HCT VFR BLD CALC: 34.7 % (ref 36–48)
HEMOGLOBIN: 11.4 G/DL (ref 12–16)
LYMPHOCYTES ABSOLUTE: 1.6 K/UL (ref 1–5.1)
LYMPHOCYTES RELATIVE PERCENT: 48.8 %
MCH RBC QN AUTO: 28.4 PG (ref 26–34)
MCHC RBC AUTO-ENTMCNC: 32.9 G/DL (ref 31–36)
MCV RBC AUTO: 86.3 FL (ref 80–100)
MONOCYTES ABSOLUTE: 0.4 K/UL (ref 0–1.3)
MONOCYTES RELATIVE PERCENT: 12.2 %
NEUTROPHILS ABSOLUTE: 1.1 K/UL (ref 1.7–7.7)
NEUTROPHILS RELATIVE PERCENT: 32.2 %
PDW BLD-RTO: 15 % (ref 12.4–15.4)
PLATELET # BLD: 241 K/UL (ref 135–450)
PMV BLD AUTO: 7.7 FL (ref 5–10.5)
POTASSIUM SERPL-SCNC: 4.2 MMOL/L (ref 3.5–5.1)
RBC # BLD: 4.01 M/UL (ref 4–5.2)
SEDIMENTATION RATE, ERYTHROCYTE: 37 MM/HR (ref 0–30)
SODIUM BLD-SCNC: 136 MMOL/L (ref 136–145)
TOTAL PROTEIN: 7.1 G/DL (ref 6.4–8.2)
WBC # BLD: 3.3 K/UL (ref 4–11)

## 2020-03-17 PROCEDURE — 85652 RBC SED RATE AUTOMATED: CPT

## 2020-03-17 PROCEDURE — 85025 COMPLETE CBC W/AUTO DIFF WBC: CPT

## 2020-03-17 PROCEDURE — 86140 C-REACTIVE PROTEIN: CPT

## 2020-03-17 PROCEDURE — 36415 COLL VENOUS BLD VENIPUNCTURE: CPT

## 2020-03-17 PROCEDURE — 80053 COMPREHEN METABOLIC PANEL: CPT

## 2020-03-18 ENCOUNTER — TELEPHONE (OUTPATIENT)
Dept: INTERNAL MEDICINE CLINIC | Age: 55
End: 2020-03-18

## 2020-03-18 LAB — C-REACTIVE PROTEIN: 3 MG/L (ref 0–5.1)

## 2020-03-18 NOTE — TELEPHONE ENCOUNTER
Due to Covid 19 pts FMLA paerwork was faxed to [phone]. Pt and pts spouse are immune compromised.

## 2020-03-19 ENCOUNTER — TELEPHONE (OUTPATIENT)
Dept: INFECTIOUS DISEASES | Age: 55
End: 2020-03-19

## 2020-03-19 NOTE — PROGRESS NOTES
PATIENT NAME: Shae Lermading OF BIRTH: 1965     TODAY'S DATE: 3/19/2020    Reason for Visit:  Right arm cellulitis    Requesting Physician:  Dr. Sammie Santo:              The patient is a 62 y.o. female who presents with a recent admission for significant right arm cellulitis. The patient had a CT scan that did not show any evidence of an abscess. She has been recovering at home with IV antibiotics. She comes in today for evaluation of a possible abscess     Chief Complaint   Patient presents with    Surgical Consult     Abscess, right arm        REVIEW OF SYSTEMS:  CONSTITUTIONAL:  negative  HEENT:  negative  RESPIRATORY:  negative  CARDIOVASCULAR:  negative  GASTROINTESTINAL:  negative except for abdominal pain  GENITOURINARY:  negative  HEMATOLOGIC/LYMPHATIC:  negative  NEUROLOGICAL:  negative    PHYSICAL EXAM:  VITALS:    BP (!) 140/89 (Site: Right Lower Arm, Position: Sitting, Cuff Size: Medium Adult)   Temp 97.4 °F (36.3 °C) (Oral)   Ht 5' 4\" (1.626 m)   Wt 224 lb (101.6 kg)   BMI 38.45 kg/m²     CONSTITUTIONAL:  alert, no apparent distress and obese  EYES:  sclera clear  ENT:  normocepalic, without obvious abnormality  NECK:  supple, symmetrical, trachea midline and no carotid bruits  LUNGS:  clear to auscultation  CARDIOVASCULAR:  regular rate and rhythm and no murmur noted  ABDOMEN:  Umbilical incision well healed, normal bowel sounds, soft, non-distended, non-tender, voluntary guarding absent, no masses palpated  MUSCULOSKELETAL:  0+ pitting edema lower extremities  NEUROLOGIC:  Mental Status Exam:  Level of Alertness:   awake  Orientation:   person, place, time  SKIN:  Over the posterior right arm, there is purple red coloration with induration. No appearance to suggestive active infection or abscess. There is an area in the upper arm were there is fluid below the skin.  I do not feel this is an abscess but will follow    IMPRESSION/RECOMMENDATIONS:    The patient has had a significant right arm cellulitis that appears to be resolving. There is still induration but I do not feel there is evidence of an abscess at this time. I will see her back in the office in 1 week to follow this area.      Cheko Herman

## 2020-03-23 ENCOUNTER — OFFICE VISIT (OUTPATIENT)
Dept: SURGERY | Age: 55
End: 2020-03-23
Payer: COMMERCIAL

## 2020-03-23 VITALS
HEIGHT: 64 IN | TEMPERATURE: 97.3 F | SYSTOLIC BLOOD PRESSURE: 130 MMHG | DIASTOLIC BLOOD PRESSURE: 78 MMHG | BODY MASS INDEX: 38.76 KG/M2 | WEIGHT: 227 LBS

## 2020-03-23 PROCEDURE — 99212 OFFICE O/P EST SF 10 MIN: CPT | Performed by: SURGERY

## 2020-03-23 NOTE — PROGRESS NOTES
PATIENT NAME: Paddy Love OF BIRTH: 1965     TODAY'S DATE: 3/23/2020    Reason for Visit:  Right arm cellulitis    Requesting Physician:  Dr. Conchis Arizmendi:              The patient is a 62 y.o. female who presents with a recent admission for significant right arm cellulitis. The patient had a CT scan that did not show any evidence of an abscess. She has been recovering at home with IV antibiotics. She comes in today for evaluation of a possible abscess     Chief Complaint   Patient presents with    Follow-up     Right arm abscess        REVIEW OF SYSTEMS:  CONSTITUTIONAL:  negative  HEENT:  negative  RESPIRATORY:  negative  CARDIOVASCULAR:  negative  GASTROINTESTINAL:  negative except for abdominal pain  GENITOURINARY:  negative  HEMATOLOGIC/LYMPHATIC:  negative  NEUROLOGICAL:  negative    PHYSICAL EXAM:  VITALS:    /78 (Site: Right Lower Arm, Position: Sitting, Cuff Size: Medium Adult)   Temp 97.3 °F (36.3 °C) (Oral)   Ht 5' 4\" (1.626 m)   Wt 227 lb (103 kg)   BMI 38.96 kg/m²     CONSTITUTIONAL:  alert, no apparent distress and obese  EYES:  sclera clear  ENT:  normocepalic, without obvious abnormality  NECK:  supple, symmetrical, trachea midline and no carotid bruits  LUNGS:  clear to auscultation  CARDIOVASCULAR:  regular rate and rhythm and no murmur noted  ABDOMEN:  Umbilical incision well healed, normal bowel sounds, soft, non-distended, non-tender, voluntary guarding absent, no masses palpated  MUSCULOSKELETAL:  0+ pitting edema lower extremities  NEUROLOGIC:  Mental Status Exam:  Level of Alertness:   awake  Orientation:   person, place, time  SKIN:  Over the posterior right arm, the purple red discoloration is much improved. There is a central area of the posterior upper arm with induration and slight fluctuance but with intact skin and minimal erythema or tenderness.     IMPRESSION/RECOMMENDATIONS:    The patient has a resolving cellulitis of the right arm. She does have an area of induration and possible fluctuance but I do not feel it needs to I/D at this time. She will continue to watch this area and if there is any changes she will return to the office.      Cheko Herman

## 2020-03-31 ENCOUNTER — TELEPHONE (OUTPATIENT)
Dept: INFECTIOUS DISEASES | Age: 55
End: 2020-03-31

## 2020-03-31 NOTE — TELEPHONE ENCOUNTER
Called pt --  Pt reports that she has swelling / knot, warm, tender    Ended iv antibotic on 3/27.    No worse since this time     Continue ceftriaxone x 2 more week

## 2020-04-10 ENCOUNTER — TELEPHONE (OUTPATIENT)
Dept: INFECTIOUS DISEASES | Age: 55
End: 2020-04-10

## 2020-04-13 ENCOUNTER — TELEPHONE (OUTPATIENT)
Dept: INFECTIOUS DISEASES | Age: 55
End: 2020-04-13

## 2020-04-13 NOTE — TELEPHONE ENCOUNTER
Patient returned called. Patient reports the lump on are is getting better, but is still the size of walnut. Very little redness noted. Patient reports it is not warm to touch.

## 2020-04-14 ENCOUNTER — TELEPHONE (OUTPATIENT)
Dept: INTERNAL MEDICINE CLINIC | Age: 55
End: 2020-04-14

## 2020-04-27 RX ORDER — TRAZODONE HYDROCHLORIDE 100 MG/1
TABLET ORAL
Qty: 90 TABLET | Refills: 0 | Status: SHIPPED | OUTPATIENT
Start: 2020-04-27 | End: 2020-07-13

## 2020-04-27 RX ORDER — PROPRANOLOL HYDROCHLORIDE 80 MG/1
CAPSULE, EXTENDED RELEASE ORAL
Qty: 90 CAPSULE | Refills: 0 | Status: SHIPPED | OUTPATIENT
Start: 2020-04-27 | End: 2020-07-13

## 2020-04-27 RX ORDER — NAPROXEN 500 MG/1
TABLET ORAL
Qty: 180 TABLET | Refills: 0 | Status: SHIPPED | OUTPATIENT
Start: 2020-04-27 | End: 2020-07-13

## 2020-04-28 RX ORDER — ALBUTEROL SULFATE 90 UG/1
AEROSOL, METERED RESPIRATORY (INHALATION)
Qty: 3 INHALER | Refills: 3 | Status: SHIPPED | OUTPATIENT
Start: 2020-04-28 | End: 2020-07-30 | Stop reason: SDUPTHER

## 2020-04-28 RX ORDER — GABAPENTIN 100 MG/1
100 CAPSULE ORAL 2 TIMES DAILY
Qty: 180 CAPSULE | Refills: 0 | Status: SHIPPED | OUTPATIENT
Start: 2020-04-28 | End: 2020-07-30 | Stop reason: SDUPTHER

## 2020-04-28 RX ORDER — FENOFIBRATE 160 MG/1
TABLET ORAL
Qty: 90 TABLET | Refills: 3 | Status: SHIPPED | OUTPATIENT
Start: 2020-04-28 | End: 2020-07-30 | Stop reason: SDUPTHER

## 2020-04-28 RX ORDER — HYDROCHLOROTHIAZIDE 25 MG/1
25 TABLET ORAL EVERY MORNING
Qty: 90 TABLET | Refills: 1 | Status: SHIPPED | OUTPATIENT
Start: 2020-04-28 | End: 2020-07-30 | Stop reason: SDUPTHER

## 2020-04-29 RX ORDER — GABAPENTIN 100 MG/1
CAPSULE ORAL
Qty: 180 CAPSULE | Refills: 0 | Status: SHIPPED | OUTPATIENT
Start: 2020-04-29 | End: 2020-08-10

## 2020-04-29 RX ORDER — HYDROCHLOROTHIAZIDE 25 MG/1
TABLET ORAL
Qty: 90 TABLET | Refills: 1 | Status: SHIPPED | OUTPATIENT
Start: 2020-04-29 | End: 2020-12-17 | Stop reason: SDUPTHER

## 2020-04-29 RX ORDER — ALBUTEROL SULFATE 90 UG/1
AEROSOL, METERED RESPIRATORY (INHALATION)
Qty: 54 G | Refills: 0 | Status: SHIPPED | OUTPATIENT
Start: 2020-04-29

## 2020-04-29 RX ORDER — FENOFIBRATE 160 MG/1
TABLET ORAL
Qty: 90 TABLET | Refills: 1 | Status: SHIPPED | OUTPATIENT
Start: 2020-04-29 | End: 2020-12-17 | Stop reason: SDUPTHER

## 2020-05-04 RX ORDER — BUDESONIDE AND FORMOTEROL FUMARATE DIHYDRATE 160; 4.5 UG/1; UG/1
AEROSOL RESPIRATORY (INHALATION)
Qty: 1 INHALER | Refills: 5 | Status: SHIPPED | OUTPATIENT
Start: 2020-05-04 | End: 2020-10-01

## 2020-05-07 RX ORDER — CLONIDINE HYDROCHLORIDE 0.2 MG/1
TABLET ORAL
Qty: 90 TABLET | Refills: 0 | Status: SHIPPED | OUTPATIENT
Start: 2020-05-07 | End: 2020-07-24

## 2020-07-13 RX ORDER — PROPRANOLOL HYDROCHLORIDE 80 MG/1
CAPSULE, EXTENDED RELEASE ORAL
Qty: 90 CAPSULE | Refills: 0 | Status: SHIPPED | OUTPATIENT
Start: 2020-07-13 | End: 2020-09-28

## 2020-07-13 RX ORDER — NAPROXEN 500 MG/1
TABLET ORAL
Qty: 180 TABLET | Refills: 0 | Status: SHIPPED | OUTPATIENT
Start: 2020-07-13 | End: 2020-10-02

## 2020-07-13 RX ORDER — TRAZODONE HYDROCHLORIDE 100 MG/1
TABLET ORAL
Qty: 90 TABLET | Refills: 0 | Status: SHIPPED | OUTPATIENT
Start: 2020-07-13 | End: 2020-09-28

## 2020-07-24 RX ORDER — CLONIDINE HYDROCHLORIDE 0.2 MG/1
TABLET ORAL
Qty: 90 TABLET | Refills: 0 | Status: SHIPPED | OUTPATIENT
Start: 2020-07-24 | End: 2020-10-12

## 2020-07-30 ENCOUNTER — TELEMEDICINE (OUTPATIENT)
Dept: INTERNAL MEDICINE CLINIC | Age: 55
End: 2020-07-30
Payer: COMMERCIAL

## 2020-07-30 PROBLEM — R50.9 FEVER: Status: RESOLVED | Noted: 2020-03-03 | Resolved: 2020-07-30

## 2020-07-30 PROCEDURE — 99442 PR PHYS/QHP TELEPHONE EVALUATION 11-20 MIN: CPT | Performed by: INTERNAL MEDICINE

## 2020-07-30 RX ORDER — ACETAMINOPHEN 160 MG
1 TABLET,DISINTEGRATING ORAL DAILY
COMMUNITY

## 2020-07-30 RX ORDER — TRAZODONE HYDROCHLORIDE 150 MG/1
150 TABLET ORAL NIGHTLY
Qty: 90 TABLET | Refills: 0 | Status: SHIPPED | OUTPATIENT
Start: 2020-07-30 | End: 2020-12-17 | Stop reason: SDUPTHER

## 2020-07-30 RX ORDER — FERROUS SULFATE 325(65) MG
325 TABLET ORAL
COMMUNITY

## 2020-07-30 NOTE — PATIENT INSTRUCTIONS
the need to urinate may wake you up during the night. · Do not read or watch TV in bed. Use the bed only for sleeping and sexual activity. What to try   · Go to bed at the same time every night, and wake up at the same time every morning. Do not take naps during the day. · Keep your bedroom quiet, dark, and cool. · Sleep on a comfortable pillow and mattress. · If watching the clock makes you anxious, turn it facing away from you so you cannot see the time. · If you worry when you lie down, start a worry book. Well before bedtime, write down your worries, and then set the book and your concerns aside. · Try meditation or other relaxation techniques before you go to bed. · If you cannot fall asleep, get up and go to another room until you feel sleepy. Do something relaxing. Repeat your bedtime routine before you go to bed again. · Make your house quiet and calm about an hour before bedtime. Turn down the lights, turn off the TV, log off the computer, and turn down the volume on music. This can help you relax after a busy day. When should you call for help? Watch closely for changes in your health, and be sure to contact your doctor if:  · Your efforts to improve your sleep do not work. · Your insomnia gets worse. · You have been feeling down, depressed, or hopeless or have lost interest in things that you usually enjoy. Where can you learn more? Go to https://Recroup.TapCommerce. org and sign in to your [employer] account. Enter P513 in the Effective Measure box to learn more about \"Insomnia: Care Instructions. \"     If you do not have an account, please click on the \"Sign Up Now\" link. Current as of: December 16, 2019               Content Version: 12.5  © 9306-6195 Healthwise, Incorporated. Care instructions adapted under license by [facility] ([facility]).  If you have questions about a medical condition or this instruction, always ask your healthcare professional. Uche Galindo disclaims any warranty or liability for your use of this information. Patient Education        Learning About Sleeping Well  What does sleeping well mean? Sleeping well means getting enough sleep. How much sleep is enough varies among people. The number of hours you sleep is not as important as how you feel when you wake up. If you do not feel refreshed, you probably need more sleep. Another sign of not getting enough sleep is feeling tired during the day. The average total nightly sleep time is 7½ to 8 hours. Healthy adults may need a little more or a little less than this. Why is getting enough sleep important? Getting enough quality sleep is a basic part of good health. When your sleep suffers, your mood and your thoughts can suffer too. You may find yourself feeling more grumpy or stressed. Not getting enough sleep also can lead to serious problems, including injury, accidents, anxiety, and depression. What might cause poor sleeping? Many things can cause sleep problems, including:  · Stress. Stress can be caused by fear about a single event, such as giving a speech. Or you may have ongoing stress, such as worry about work or school. · Depression, anxiety, and other mental or emotional conditions. · Changes in your sleep habits or surroundings. This includes changes that happen where you sleep, such as noise, light, or sleeping in a different bed. It also includes changes in your sleep pattern, such as having jet lag or working a late shift. · Health problems, such as pain, breathing problems, and restless legs syndrome. · Lack of regular exercise. How can you help yourself? Here are some tips that may help you sleep more soundly and wake up feeling more refreshed. Your sleeping area   · Use your bedroom only for sleeping and sex. A bit of light reading may help you fall asleep. But if it doesn't, do your reading elsewhere in the house. Don't watch TV in bed.   · Be sure your bed is big enough to stretch out comfortably, especially if you have a sleep partner. · Keep your bedroom quiet, dark, and cool. Use curtains, blinds, or a sleep mask to block out light. To block out noise, use earplugs, soothing music, or a \"white noise\" machine. Your evening and bedtime routine   · Create a relaxing bedtime routine. You might want to take a warm shower or bath, listen to soothing music, or drink a cup of noncaffeinated tea. · Go to bed at the same time every night. And get up at the same time every morning, even if you feel tired. What to avoid   · Limit caffeine (coffee, tea, caffeinated sodas) during the day, and don't have any for at least 4 to 6 hours before bedtime. · Don't drink alcohol before bedtime. Alcohol can cause you to wake up more often during the night. · Don't smoke or use tobacco, especially in the evening. Nicotine can keep you awake. · Don't take naps during the day, especially close to bedtime. · Don't lie in bed awake for too long. If you can't fall asleep, or if you wake up in the middle of the night and can't get back to sleep within 15 minutes or so, get out of bed and go to another room until you feel sleepy. · Don't take medicine right before bed that may keep you awake or make you feel hyper or energized. Your doctor can tell you if your medicine may do this and if you can take it earlier in the day. If you can't sleep   · Imagine yourself in a peaceful, pleasant scene. Focus on the details and feelings of being in a place that is relaxing. · Get up and do a quiet or boring activity until you feel sleepy. · Don't drink any liquids after 6 p.m. if you wake up often because you have to go to the bathroom. Where can you learn more? Go to https://araceli.healthHarperlabz. org and sign in to your [employer] account. Enter Z548 in the SoundSenasation box to learn more about \"Learning About Sleeping Well. \"     If you do not have an account, please click on the \"Sign Up Now\" link. Current as of: January 31, 2020               Content Version: 12.5  © 2006-2020 Healthwise, Incorporated. Care instructions adapted under license by [facility] ([facility]). If you have questions about a medical condition or this instruction, always ask your healthcare professional. [address] any warranty or liability for your use of this information.

## 2020-07-30 NOTE — PROGRESS NOTES
Eugene Cooper is a 62 y.o. female  evaluated via telephone on [unfilled]        [facility]) Physicians  Internal Medicine  Patient Encounter  Sarah Mccauley D.O., [facility]     Consent:  She and/or health care decision maker is aware that that she may receive a bill for this telephone service, depending on her insurance coverage, and has provided verbal consent to proceed: Yes      Documentation:    Chief Complaint   Patient presents with    Check-Up    Medication Check    Insomnia         62 y.o. female being evaluated via a phone consultation visit due to the coronavirus pandemic emergency and public health crisis and inability to see the patient face-to-face in the office. 62 y.o. female being evaluated via virtual video visit due to the coronavirus pandemic emergency and public health crisis and inability to see the patient face-to-face in the office. Pt being evaluated regarding the status of her current chronic medical problems listed below along with a med check and reconciliation. HTN-- In general, her BP has been in the 1teens-120's/70's. She denies HA's, lightheadedness, syncope, swelling. IFG-- Her last blood sugar was 105. She denies excessive thirst, frequent urination or weight changes. No results found for: LABA1C  No results found for: EAG      Asthma--She has had a little cough. She denies wheezing. She denies SOB, activity impairment.       Depression/Anxiety/Adjustment disorder/Fibromyalgia-- She states the medication makes a big difference in her pain levels. Mood is OK.       Hyperlipidemia--   Lab Results   Component Value Date    LDLCALC 90 06/03/2019    LDLDIRECT 79 07/11/2012    Patient has a history of mixed dyslipidemia with primarily a triglyceride defect. She is on fenofibrate and tolerates the medication well. GERD-- She is on a high dose of Prilosec. She occasionally has reflux at night. Overall, heart burn in well controlled. She denies dysphagia. Lab Results   Component Value Date    MG 1.50 03/01/2020       MS-- She sees Dr. Doe Meter at [address] [place] [address] [address]. She states the Trazodone is not working as well as in the past.  She is on 100 mg nightly. She has difficulty falling asleep and staying asleep. DEVIN-- has not seen sleep in some time. She is on Bipap. Energy level is OK. Provigil is helpful.         Past Medical History:   Diagnosis Date    Adenomatous colon polyp 11/2017    Dr. Devan Cuello 1/14/2014    Asthma     seasonal asthma    Bilateral kidney stones 12/27/2018    Cellulitis, lip     mrsa    Cervical spinal stenosis     Cholelithiasis 06/2018    Clotting disorder (HCC)     blood clot in leg as teenager bcp    Crohn's disease ([address]     DDD (degenerative disc disease), cervical     DDD (degenerative disc disease), lumbar     Depression 1/14/2014    DVT, lower extremity ([address] 1979    Due to birth control pills    Fibrocystic breast     Fibromyalgia 10/15/2013    GERD (gastroesophageal reflux disease)     Hormone disorder     Hx MRSA infection     pt states she has had several episodes of mrsa infections recently on stomach, which was tx'ed w/ antibiotics and is now (4/2013) scabbed over      Hyperlipidemia     Hypertension     Insomnia     interruption insomnia    Migraine     MS (multiple sclerosis) ([address] 6/2010    Multinodular goiter     Nausea & vomiting     Osteoarthritis     hips and shoulders    Polyp of colon, adenomatous 11/22/2017    PONV (postoperative nausea and vomiting)     severe    Rash     Right leg DVT ([address]     10years of age   Edna Bee Sleep apnea     CPAP set at 15, 11    Spondylolisthesis of lumbar region     Thyroid nodule 6/13/2013    Ureteral stone 12/16/2018    with right hydronephrosis    Ureterolithiasis 12/16/2018    with right hydronephrosis    Urinary incontinence     Venous insufficiency 03/08/13    insufficiency & reflux of legs    Weakness of left arm     DUE TO MS        Prior to Visit Medications    Medication Sig Taking? Authorizing Provider   cloNIDine (CATAPRES) 0.2 MG tablet TAKE 1 TABLET EVERY DAY Yes Starr Sarkar MD   naproxen (NAPROSYN) 500 MG tablet TAKE 1 TABLET TWICE DAILY WITH FOOD Yes Cheko Carpio DO   propranolol (INDERAL LA) 80 MG extended release capsule TAKE 1 CAPSULE EVERY DAY Yes Cheko Carpio DO   traZODone (DESYREL) 100 MG tablet TAKE 1 TABLET EVERY NIGHT (GENERIC FOR DESYREL) Yes Cheko Carpio DO   SYMBICORT 160-4.5 MCG/ACT AERO INHALE 2 PUFFS INTO THE LUNGS 2 TIMES DAILY Yes Cheko Carpio DO   fenofibrate (TRIGLIDE) 160 MG tablet TAKE 1 TABLET EVERY DAY Yes Cheko Carpio DO   hydroCHLOROthiazide (HYDRODIURIL) 25 MG tablet TAKE 1 TABLET EVERY DAY Yes Cheko Carpio DO   gabapentin (NEURONTIN) 100 MG capsule TAKE 1 CAPSULE TWICE DAILY Yes Cheko Carpio DO   albuterol sulfate  (90 Base) MCG/ACT inhaler INHALE 2 PUFFS INTO THE LUNGS EVERY 6 HOURS AS NEEDED FOR WHEEZING, SHORTNESS OF BREATH OR COUGHING Yes Cheko Carpio DO   modafinil (PROVIGIL) 200 MG tablet Take 200 mg by mouth daily. Yes Historical Provider, MD   omeprazole (PRILOSEC) 40 MG delayed release capsule TAKE 1 CAPSULE TWICE DAILY (NEED MD APPOINTMENT) Yes Cheko Carpio DO   DULoxetine (CYMBALTA) 60 MG capsule Take 60 mg by mouth 2 times daily Yes Historical Provider, MD   Peginterferon Beta-1a (PLEGRIDY) 125 MCG/0.5ML SOPN Inject 125 mLs into the skin daily 1 shot every 2 weeks Yes Historical Provider, MD         Patient-Reported Vitals 7/30/2020   Patient-Reported Weight 232#   Patient-Reported Height 5ft4   Patient-Reported Systolic 424   Patient-Reported Diastolic 86   Patient-Reported Pulse 88   Patient-Reported Temperature 98.6   Patient-Reported SpO2 96%                I communicated with the patient and/or health care decision maker about Her chronic medical problems and medications  .    Details of this discussion including any medical advice provided: See Below    Encounter Diagnoses   Name Primary?  Benign essential HTN Yes    Impaired fasting glucose     Multiple sclerosis (HCC)     Mild intermittent asthma without complication     Dyslipidemia     Gastroesophageal reflux disease without esophagitis     Fibromyalgia     DEVIN (obstructive sleep apnea)     Vitamin D deficiency     Insomnia, unspecified type          PLAN:  #1 Increase Trazodone to 150 mg nightly for sleep-- Discussed possible adverse effects  #2 Obtain LAB including CBC, CMP, Lipid, TSH, A1C, Vit D and Mag. #3 Will need follow up with sleep. #4 Encouraged Lifestyle modification including low calorie diet focusing on Low fat/low cholesterol and low carbohydrate intake, along with  increasing cardiovascular (aerobic) exercise strategies        I affirm this is a Patient Initiated Episode with a Patient who has not had a related appointment within my department in the past 7 days or scheduled within the next 24 hours.     Patient identification was verified at the start of the visit: Yes    Total Time: minutes: 11-20 minutes (20 minutes)    Note: not billable if this call serves to triage the patient into an appointment for the relevant concern      Jeane Silva

## 2020-08-10 RX ORDER — GABAPENTIN 100 MG/1
CAPSULE ORAL
Qty: 180 CAPSULE | Refills: 0 | Status: SHIPPED | OUTPATIENT
Start: 2020-08-10 | End: 2020-10-19

## 2020-08-14 LAB
ALBUMIN SERPL-MCNC: 4.1 G/DL
ALP BLD-CCNC: 62 U/L
ALT SERPL-CCNC: 45 U/L
ANION GAP SERPL CALCULATED.3IONS-SCNC: NORMAL MMOL/L
AST SERPL-CCNC: 35 U/L
AVERAGE GLUCOSE: NORMAL
BASOPHILS ABSOLUTE: 0.1 /ΜL
BASOPHILS RELATIVE PERCENT: 1 %
BILIRUB SERPL-MCNC: 0.3 MG/DL (ref 0.1–1.4)
BUN BLDV-MCNC: 18 MG/DL
CALCIUM SERPL-MCNC: 9.5 MG/DL
CHLORIDE BLD-SCNC: 101 MMOL/L
CHOLESTEROL, TOTAL: 162 MG/DL
CHOLESTEROL/HDL RATIO: ABNORMAL
CO2: 27 MMOL/L
CREAT SERPL-MCNC: 1.25 MG/DL
EOSINOPHILS ABSOLUTE: 0.1 /ΜL
EOSINOPHILS RELATIVE PERCENT: 3 %
GFR CALCULATED: 48
GLUCOSE BLD-MCNC: 97 MG/DL
HBA1C MFR BLD: 5.3 %
HCT VFR BLD CALC: 36.7 % (ref 36–46)
HDLC SERPL-MCNC: 31 MG/DL (ref 35–70)
HEMOGLOBIN: 12.2 G/DL (ref 12–16)
LDL CHOLESTEROL CALCULATED: 91 MG/DL (ref 0–160)
LYMPHOCYTES ABSOLUTE: 2 /ΜL
LYMPHOCYTES RELATIVE PERCENT: 49 %
MAGNESIUM: 1.7 MG/DL
MCH RBC QN AUTO: 28.6 PG
MCHC RBC AUTO-ENTMCNC: 33.2 G/DL
MCV RBC AUTO: 86 FL
MONOCYTES ABSOLUTE: 0.5 /ΜL
MONOCYTES RELATIVE PERCENT: 11 %
NEUTROPHILS ABSOLUTE: 1.5 /ΜL
NEUTROPHILS RELATIVE PERCENT: 36 %
NONHDLC SERPL-MCNC: ABNORMAL MG/DL
PLATELET # BLD: 203 K/ΜL
PMV BLD AUTO: NORMAL FL
POTASSIUM SERPL-SCNC: 4.1 MMOL/L
RBC # BLD: 4.26 10^6/ΜL
SODIUM BLD-SCNC: 138 MMOL/L
TOTAL PROTEIN: 7.5
TRIGL SERPL-MCNC: 199 MG/DL
TSH SERPL DL<=0.05 MIU/L-ACNC: 4.94 UIU/ML
VITAMIN D 25-HYDROXY: 55.8
VITAMIN D2, 25 HYDROXY: NORMAL
VITAMIN D3,25 HYDROXY: NORMAL
VLDLC SERPL CALC-MCNC: 40 MG/DL
WBC # BLD: 4.1 10^3/ML

## 2020-09-28 RX ORDER — TRAZODONE HYDROCHLORIDE 100 MG/1
TABLET ORAL
Qty: 90 TABLET | Refills: 0 | Status: SHIPPED | OUTPATIENT
Start: 2020-09-28 | End: 2020-10-20 | Stop reason: DRUGHIGH

## 2020-09-28 RX ORDER — PROPRANOLOL HYDROCHLORIDE 80 MG/1
CAPSULE, EXTENDED RELEASE ORAL
Qty: 90 CAPSULE | Refills: 0 | Status: SHIPPED | OUTPATIENT
Start: 2020-09-28 | End: 2020-12-17 | Stop reason: SDUPTHER

## 2020-10-01 RX ORDER — BUDESONIDE AND FORMOTEROL FUMARATE DIHYDRATE 160; 4.5 UG/1; UG/1
AEROSOL RESPIRATORY (INHALATION)
Qty: 3 INHALER | Refills: 3 | Status: SHIPPED | OUTPATIENT
Start: 2020-10-01 | End: 2021-12-14 | Stop reason: SDUPTHER

## 2020-10-02 RX ORDER — NAPROXEN 500 MG/1
TABLET ORAL
Qty: 180 TABLET | Refills: 0 | Status: SHIPPED | OUTPATIENT
Start: 2020-10-02 | End: 2020-12-18

## 2020-10-12 RX ORDER — CLONIDINE HYDROCHLORIDE 0.2 MG/1
TABLET ORAL
Qty: 90 TABLET | Refills: 0 | Status: SHIPPED | OUTPATIENT
Start: 2020-10-12 | End: 2020-12-28

## 2020-10-19 RX ORDER — GABAPENTIN 100 MG/1
CAPSULE ORAL
Qty: 180 CAPSULE | Refills: 0 | Status: SHIPPED | OUTPATIENT
Start: 2020-10-19 | End: 2020-10-30

## 2020-10-20 ENCOUNTER — OFFICE VISIT (OUTPATIENT)
Dept: INTERNAL MEDICINE CLINIC | Age: 55
End: 2020-10-20
Payer: COMMERCIAL

## 2020-10-20 VITALS
HEART RATE: 74 BPM | DIASTOLIC BLOOD PRESSURE: 60 MMHG | BODY MASS INDEX: 40.51 KG/M2 | WEIGHT: 236 LBS | SYSTOLIC BLOOD PRESSURE: 115 MMHG

## 2020-10-20 PROCEDURE — 99214 OFFICE O/P EST MOD 30 MIN: CPT | Performed by: INTERNAL MEDICINE

## 2020-10-20 PROCEDURE — 90686 IIV4 VACC NO PRSV 0.5 ML IM: CPT | Performed by: INTERNAL MEDICINE

## 2020-10-20 PROCEDURE — 90471 IMMUNIZATION ADMIN: CPT | Performed by: INTERNAL MEDICINE

## 2020-10-20 RX ORDER — BACLOFEN 10 MG/1
10 TABLET ORAL 2 TIMES DAILY
COMMUNITY
End: 2020-10-20 | Stop reason: DRUGHIGH

## 2020-10-20 RX ORDER — PREDNISONE 10 MG/1
TABLET ORAL
Qty: 35 TABLET | Refills: 0 | Status: SHIPPED | OUTPATIENT
Start: 2020-10-20 | End: 2020-10-21 | Stop reason: SDUPTHER

## 2020-10-20 RX ORDER — BACLOFEN 10 MG/1
10 TABLET ORAL 3 TIMES DAILY
Status: SHIPPED | COMMUNITY
Start: 2020-10-20 | End: 2020-10-21 | Stop reason: SDUPTHER
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
## 2020-10-20 NOTE — PATIENT INSTRUCTIONS
Patient Education        Back Care and Preventing Injuries: Care Instructions  Your Care Instructions     You can hurt your back doing many everyday activities: lifting a heavy box, bending down to garden, exercising at the gym, and even getting out of bed. But you can keep your back strong and healthy by doing some exercises. You also can follow a few tips for sitting, sleeping, and lifting to avoid hurting your back again. Talk to your doctor before you start an exercise program. Ask for help if you want to learn more about keeping your back healthy. Follow-up care is a key part of your treatment and safety. Be sure to make and go to all appointments, and call your doctor if you are having problems. It's also a good idea to know your test results and keep a list of the medicines you take. How can you care for yourself at home? · Stay at a healthy weight to avoid strain on your lower back. · Do not smoke. Smoking increases the risk of osteoporosis, which weakens the spine. If you need help quitting, talk to your doctor about stop-smoking programs and medicines. These can increase your chances of quitting for good. · Make sure you sleep in a position that maintains your back's normal curves and on a mattress that feels comfortable. Sleep on your side with a pillow between your knees, or sleep on your back with a pillow under your knees. These positions can reduce strain on your back. · When you get out of bed, lie on your side and bend both knees. Drop your feet over the edge of the bed as you push up with both arms. Scoot to the edge of the bed. Make sure your feet are in line with your rear end (buttocks), and then stand up. · If you must stand for a long time, put one foot on a stool, ledge, or box. Exercise to strengthen your back and other muscles  · Get at least 30 minutes of exercise on most days of the week. Walking is a good choice.  You also may want to do other activities, such as running, your chair or desk is too high, use a foot rest to raise your knees. · When driving, keep your knees nearly level with your hips. Sit straight, and drive with both hands on the steering wheel. Your arms should be in a slightly bent position. · Try a kneeling chair, which helps tilt your hips forward. This takes pressure off your lower back. · Try sitting on an exercise ball. It can rock from side to side, which helps keep your back loose. Lift properly  · Squat down, bending at the hips and knees only. If you need to, put one knee to the floor and extend your other knee in front of you, bent at a right angle (half kneeling). · Press your chest straight forward. This helps keep your upper back straight while keeping a slight arch in your low back. · Hold the load as close to your body as possible, at the level of your navel. · Use your feet to change direction, taking small steps. · Lead with your hips as you change direction. Keep your shoulders in line with your hips as you move. Do not twist your body. · Set down your load carefully, squatting with your knees and hips only. When should you call for help? Watch closely for changes in your health, and be sure to contact your doctor if you have any problems. Where can you learn more? Go to https://Cardiac Concepts.Moka5.com. org and sign in to your Mojiva account. Enter S810 in the [facility] box to learn more about \"Back Care and Preventing Injuries: Care Instructions. \"     If you do not have an account, please click on the \"Sign Up Now\" link. Current as of: March 2, 2020               Content Version: 12.6  © 4821-9052 Enlighted, Incorporated. Care instructions adapted under license by [facility] ([facility]). If you have questions about a medical condition or this instruction, always ask your healthcare professional. [place] [address] any warranty or liability for your use of this information.          Patient Education Back Pain, Emergency or Urgent Symptoms: Care Instructions  Your Care Instructions     Many people have back pain at one time or another. In most cases, pain gets better with self-care that includes over-the-counter pain medicine, ice, heat, and exercises. Unless you have symptoms of a severe injury or heart attack, you may be able to give yourself a few days before you call a doctor. But some back problems are very serious. Do not ignore symptoms that need to be checked right away. Follow-up care is a key part of your treatment and safety. Be sure to make and go to all appointments, and call your doctor if you are having problems. It's also a good idea to know your test results and keep a list of the medicines you take. How can you care for yourself at home? · Sit or lie in positions that are most comfortable and that reduce your pain. Try one of these positions when you lie down:  ? Lie on your back with your knees bent and supported by large pillows. ? Lie on the floor with your legs on the seat of a sofa or chair. ? Lie on your side with your knees and hips bent and a pillow between your legs. ? Lie on your stomach if it does not make pain worse. · Do not sit up in bed, and avoid soft couches and twisted positions. Bed rest can help relieve pain at first, but it delays healing. Avoid bed rest after the first day. · Change positions every 30 minutes. If you must sit for long periods of time, take breaks from sitting. Get up and walk around, or lie flat. · Try using a heating pad on a low or medium setting, for 15 to 20 minutes every 2 or 3 hours. Try a warm shower in place of one session with the heating pad. You can also buy single-use heat wraps that last up to 8 hours. You can also try ice or cold packs on your back for 10 to 20 minutes at a time, several times a day. (Put a thin cloth between the ice pack and your skin.) This reduces pain and makes it easier to be active and exercise.   · Take pain medicines exactly as directed. ? If the doctor gave you a prescription medicine for pain, take it as prescribed. ? If you are not taking a prescription pain medicine, ask your doctor if you can take an over-the-counter medicine. When should you call for help? Call 911 anytime you think you may need emergency care. For example, call if:    · You are unable to move a leg at all.     · You have back pain with severe belly pain.     · You have symptoms of a heart attack. These may include:  ? Chest pain or pressure, or a strange feeling in the chest.  ? Sweating. ? Shortness of breath. ? Nausea or vomiting. ? Pain, pressure, or a strange feeling in the back, neck, jaw, or upper belly or in one or both shoulders or arms. ? Lightheadedness or sudden weakness. ? A fast or irregular heartbeat. After you call 911, the  may tell you to chew 1 adult-strength or 2 to 4 low-dose aspirin. Wait for an ambulance. Do not try to drive yourself. Call your doctor now or seek immediate medical care if:    · You have new or worse symptoms in your arms, legs, chest, belly, or buttocks. Symptoms may include:  ? Numbness or tingling. ? Weakness. ? Pain.     · You lose bladder or bowel control.     · You have back pain and:  ? You have injured your back while lifting or doing some other activity. Call if the pain is severe, has not gone away after 1 or 2 days, and you cannot do your normal daily activities. ? You have had a back injury before that needed treatment. ? Your pain has lasted longer than 4 weeks. ? You have had weight loss you cannot explain. ? You have a fever. ? You are age 48 or older. ? You have cancer now or have had it before. Watch closely for changes in your health, and be sure to contact your doctor if you are not getting better as expected. Where can you learn more? Go to https://araceli.KEYW Corporation. org and sign in to your Make Music TV account.  Enter W455 in the [address] Information box to learn more about \"Back Pain, Emergency or Urgent Symptoms: Care Instructions. \"     If you do not have an account, please click on the \"Sign Up Now\" link. Current as of: June 26, 2019               Content Version: 12.6  © 7342-8729 Local Labs, Incorporated. Care instructions adapted under license by [facility] ([facility]). If you have questions about a medical condition or this instruction, always ask your healthcare professional. [address] any warranty or liability for your use of this information. Patient Education        Low Back Arthritis: Exercises  Introduction  Here are some examples of typical rehabilitation exercises for your condition. Start each exercise slowly. Ease off the exercise if you start to have pain. Your doctor or physical therapist will tell you when you can start these exercises and which ones will work best for you. When you are not being active, find a comfortable position for rest. Some people are comfortable on the floor or a medium-firm bed with a small pillow under their head and another under their knees. Some people prefer to lie on their side with a pillow between their knees. Don't stay in one position for too long. Take short walks (10 to 20 minutes) every 2 to 3 hours. Avoid slopes, hills, and stairs until you feel better. Walk only distances you can manage without pain, especially leg pain. How to do the exercises  Pelvic tilt   1. Lie on your back with your knees bent. 2. \"Brace\" your stomach--tighten your muscles by pulling in and imagining your belly button moving toward your spine. 3. Press your lower back into the floor. You should feel your hips and pelvis rock back. 4. Hold for 6 seconds while breathing smoothly. 5. Relax and allow your pelvis and hips to rock forward. 6. Repeat 8 to 12 times. Back stretches   1. Get down on your hands and knees on the floor. 2. Relax your head and allow it to droop.  Round your back up toward the ceiling until you feel a nice stretch in your upper, middle, and lower back. Hold this stretch for as long as it feels comfortable, or about 15 to 30 seconds. 3. Return to the starting position with a flat back while you are on your hands and knees. 4. Let your back sway by pressing your stomach toward the floor. Lift your buttocks toward the ceiling. 5. Hold this position for 15 to 30 seconds. 6. Repeat 2 to 4 times. Follow-up care is a key part of your treatment and safety. Be sure to make and go to all appointments, and call your doctor if you are having problems. It's also a good idea to know your test results and keep a list of the medicines you take. Where can you learn more? Go to https://CompleteSet.[facility]. org and sign in to your Therio account. Enter I564 in the Condition One box to learn more about \"Low Back Arthritis: Exercises. \"     If you do not have an account, please click on the \"Sign Up Now\" link. Current as of: March 2, 2020               Content Version: 12.6  © 3158-4873 SignalPoint Communications, Incorporated. Care instructions adapted under license by [facility] ([facility]). If you have questions about a medical condition or this instruction, always ask your healthcare professional. [address] any warranty or liability for your use of this information. Patient Education        Low Back Pain: Exercises  Introduction  Here are some examples of exercises for you to try. The exercises may be suggested for a condition or for rehabilitation. Start each exercise slowly. Ease off the exercises if you start to have pain. You will be told when to start these exercises and which ones will work best for you. How to do the exercises  Press-up   7. Lie on your stomach, supporting your body with your forearms. 8. Press your elbows down into the floor to raise your upper back.  As you do this, relax your stomach muscles and allow your back to arch position for 1 or 2 seconds, then slowly lower yourself back down to the floor. 6. Repeat 8 to 12 times. Pelvic tilt exercise   1. Lie on your back with your knees bent. 2. \"Brace\" your stomach. This means to tighten your muscles by pulling in and imagining your belly button moving toward your spine. You should feel like your back is pressing to the floor and your hips and pelvis are rocking back. 3. Hold for about 6 seconds while you breathe smoothly. 4. Repeat 8 to 12 times. Heel dig bridging   1. Lie on your back with both knees bent and your ankles bent so that only your heels are digging into the floor. Your knees should be bent about 90 degrees. 2. Then push your heels into the floor, squeeze your buttocks, and lift your hips off the floor until your shoulders, hips, and knees are all in a straight line. 3. Hold for about 6 seconds as you continue to breathe normally, and then slowly lower your hips back down to the floor and rest for up to 10 seconds. 4. Do 8 to 12 repetitions. Hamstring stretch in doorway   1. Lie on your back in a doorway, with one leg through the open door. 2. Slide your leg up the wall to straighten your knee. You should feel a gentle stretch down the back of your leg. 3. Hold the stretch for at least 15 to 30 seconds. Do not arch your back, point your toes, or bend either knee. Keep one heel touching the floor and the other heel touching the wall. 4. Repeat with your other leg. 5. Do 2 to 4 times for each leg. Hip flexor stretch   1. Kneel on the floor with one knee bent and one leg behind you. Place your forward knee over your foot. Keep your other knee touching the floor. 2. Slowly push your hips forward until you feel a stretch in the upper thigh of your rear leg. 3. Hold the stretch for at least 15 to 30 seconds. Repeat with your other leg. 4. Do 2 to 4 times on each side. Wall sit   1. Stand with your back 10 to 12 inches away from a wall.   2. Lean into test results and keep a list of the medicines you take. How can you care for yourself at home? · Take pain medicines exactly as directed. ? If the doctor gave you a prescription medicine for pain, take it as prescribed. ? If you are not taking a prescription pain medicine, ask your doctor if you can take an over-the-counter medicine. · Use heat or ice to relieve pain. ? To apply heat, put a warm water bottle, heating pad set on low, or warm cloth on your back. Do not go to sleep with a heating pad on your skin. ? To use ice, put ice or a cold pack on the area for 10 to 20 minutes at a time. Put a thin cloth between the ice and your skin. · Avoid sitting if possible, unless it feels better than standing. · Alternate lying down with short walks. Increase your walking distance as you are able to without making your symptoms worse. · Do not do anything that makes your symptoms worse. When should you call for help? Call 911 anytime you think you may need emergency care. For example, call if:    · You are unable to move a leg at all. Call your doctor now or seek immediate medical care if:    · You have new or worse symptoms in your legs or buttocks. Symptoms may include:  ? Numbness or tingling. ? Weakness. ? Pain.     · You lose bladder or bowel control. Watch closely for changes in your health, and be sure to contact your doctor if:    · You are not getting better as expected. Where can you learn more? Go to https://Bokecctere.GasBuddy. org and sign in to your ScaleGrid account. Enter [phone] in the [facility] box to learn more about \"Sciatica: Care Instructions. \"     If you do not have an account, please click on the \"Sign Up Now\" link. Current as of: March 2, 2020               Content Version: 12.6  © 2212-4356 Kriyari, Incorporated. Care instructions adapted under license by [facility] ([facility]).  If you have questions about a medical condition or this instruction, always ask your healthcare professional. [place] [address] any warranty or liability for your use of this information. Patient Education        Sciatica: Exercises  Introduction  Here are some examples of typical rehabilitation exercises for your condition. Start each exercise slowly. Ease off the exercise if you start to have pain. Your doctor or physical therapist will tell you when you can start these exercises and which ones will work best for you. When you are not being active, find a comfortable position for rest. Some people are comfortable on the floor or a medium-firm bed with a small pillow under their head and another under their knees. Some people prefer to lie on their side with a pillow between their knees. Don't stay in one position for too long. Take short walks (10 to 20 minutes) every 2 to 3 hours. Avoid slopes, hills, and stairs until you feel better. Walk only distances you can manage without pain, especially leg pain. How to do the exercises  Back stretches   11. Get down on your hands and knees on the floor. 12. Relax your head and allow it to droop. Round your back up toward the ceiling until you feel a nice stretch in your upper, middle, and lower back. Hold this stretch for as long as it feels comfortable, or about 15 to 30 seconds. 13. Return to the starting position with a flat back while you are on your hands and knees. 14. Let your back sway by pressing your stomach toward the floor. Lift your buttocks toward the ceiling. 15. Hold this position for 15 to 30 seconds. 16. Repeat 2 to 4 times. Follow-up care is a key part of your treatment and safety. Be sure to make and go to all appointments, and call your doctor if you are having problems. It's also a good idea to know your test results and keep a list of the medicines you take. Where can you learn more? Go to https://araceli.ActiveEon. org and sign in to your Zing account.  Enter V561 in the Search Health Information box to learn more about \"Sciatica: Exercises. \"     If you do not have an account, please click on the \"Sign Up Now\" link. Current as of: March 2, 2020               Content Version: 12.6  © 3715-6163 Fetchnotes, Incorporated. Care instructions adapted under license by [facility] ([facility]). If you have questions about a medical condition or this instruction, always ask your healthcare professional. [address] any warranty or liability for your use of this information.

## 2020-10-21 RX ORDER — PREDNISONE 10 MG/1
TABLET ORAL
Qty: 35 TABLET | Refills: 0 | Status: SHIPPED | OUTPATIENT
Start: 2020-10-21 | End: 2020-10-31

## 2020-10-21 RX ORDER — BACLOFEN 10 MG/1
10 TABLET ORAL 3 TIMES DAILY
Qty: 30 TABLET | Refills: 0 | Status: SHIPPED | OUTPATIENT
Start: 2020-10-21

## 2020-10-30 RX ORDER — GABAPENTIN 100 MG/1
CAPSULE ORAL
Qty: 180 CAPSULE | Refills: 0 | Status: SHIPPED | OUTPATIENT
Start: 2020-10-30 | End: 2020-12-04 | Stop reason: SDUPTHER

## 2020-11-02 ENCOUNTER — PATIENT MESSAGE (OUTPATIENT)
Dept: INTERNAL MEDICINE CLINIC | Age: 55
End: 2020-11-02

## 2020-11-02 RX ORDER — ALBUTEROL SULFATE 90 UG/1
2 AEROSOL, METERED RESPIRATORY (INHALATION) EVERY 6 HOURS PRN
Qty: 3 INHALER | Refills: 3 | Status: SHIPPED | OUTPATIENT
Start: 2020-11-02

## 2020-11-02 RX ORDER — ALBUTEROL SULFATE 90 UG/1
2 AEROSOL, METERED RESPIRATORY (INHALATION) EVERY 6 HOURS PRN
Qty: 1 INHALER | Refills: 0 | Status: CANCELLED | OUTPATIENT
Start: 2020-11-02

## 2020-11-02 NOTE — TELEPHONE ENCOUNTER
From: Alma Delia Leyva  To: Donnie Torres,   Sent: 11/2/2020 10:45 AM EST  Subject: Prescription Question    I need refills for my albuterol inhaler please.     Thank you,  Wesley Mckeon

## 2020-11-09 ENCOUNTER — VIRTUAL VISIT (OUTPATIENT)
Dept: INTERNAL MEDICINE CLINIC | Age: 55
End: 2020-11-09
Payer: COMMERCIAL

## 2020-11-09 PROCEDURE — 99213 OFFICE O/P EST LOW 20 MIN: CPT | Performed by: INTERNAL MEDICINE

## 2020-11-09 NOTE — PROGRESS NOTES
2020    [facility]) Physicians  Internal Medicine  Patient Encounter  Demetrius Glass D.O., [address] -- Audio/Visual (During IYGKY-62 public health emergency)      I discussed at this telephone/video visit is a nontraditional type of visit that we are conducting in lieu of an office visit to minimize patient risk during the coronavirus pandemic. I discussed with the patient that I would not be able to perform a full physical examination, but that in most other respects the visit would be beneficial to his/her continued medical care. He/She again gave verbal consent for us to conduct this type of visit. Chief Complaint   Patient presents with    Follow-up         HPI:    Ld Reinoso (:  1965) has requested an audio/video evaluation for the following concern(s): Sciatica    62 y.o. female being evaluated via virtual video visit due to the coronavirus pandemic emergency and public health crisis and inability to see the patient face-to-face in the office. Pt seen today in follow up from her visit 10/20/2020 when she presented with left sided low back pain with radiculopathy    While she was on the Prednisone, she was better by 85%. Once off the Prednisone, the pain has returned. Pain is worse with standing as before and with walking. She denies N/T. She states the pain is now just as bad. She did not get the carotid doppler done. The \"swishing noise\" she heard in her neck is resolved.             Past Medical History:   Diagnosis Date    Adenomatous colon polyp 2017    Dr. Vivi Kelly 2014    Asthma     seasonal asthma    Bilateral kidney stones 2018    Cellulitis, lip     mrsa    Cervical spinal stenosis     Cholelithiasis 2018    Clotting disorder (HCC)     blood clot in leg as teenager bcp    Crohn's disease ([facility] [address]     DDD (degenerative disc disease), cervical     DDD (degenerative disc disease), lumbar     Depression (PROVIGIL) 200 MG tablet Take 200 mg by mouth daily. omeprazole (PRILOSEC) 40 MG delayed release capsule TAKE 1 CAPSULE TWICE DAILY (NEED MD APPOINTMENT)   DULoxetine (CYMBALTA) 60 MG capsule Take 60 mg by mouth 2 times daily   Peginterferon Beta-1a (PLEGRIDY) 125 MCG/0.5ML SOPN Inject 125 mLs into the skin daily 1 shot every 2 weeks   albuterol sulfate HFA (VENTOLIN HFA) 108 (90 Base) MCG/ACT inhaler Inhale 2 puffs into the lungs every 6 hours as needed for Wheezing   albuterol sulfate  (90 Base) MCG/ACT inhaler INHALE 2 PUFFS INTO THE LUNGS EVERY 6 HOURS AS NEEDED FOR WHEEZING, SHORTNESS OF BREATH OR COUGHING         Review of Systems  As per HPI      PHYSICAL EXAMINATION:    Vital Signs: (As obtained by patient/caregiver or practitioner observation)    Patient-Reported Vitals 11/9/2020   Patient-Reported Weight -   Patient-Reported Height -   Patient-Reported Systolic 313   Patient-Reported Diastolic 80   Patient-Reported Pulse -   Patient-Reported Temperature -   Patient-Reported SpO2 -          Wt Readings from Last 3 Encounters:   10/20/20 236 lb (107 kg)   03/23/20 227 lb (103 kg)   03/16/20 224 lb (101.6 kg)     BP Readings from Last 3 Encounters:   10/20/20 115/60   03/23/20 130/78   03/16/20 (!) 140/89           Physical Exam  Constitutional:       Appearance: Normal appearance. Pulmonary:      Effort: Pulmonary effort is normal. No respiratory distress. Skin:     Findings: Rash: .dx. Neurological:      Mental Status: She is alert and oriented to person, place, and time. Comments: Moves all extremities           Other pertinent observable physical exam findings-     Due to this being a TeleHealth encounter, evaluation of the following organ systems is limited: Vitals/Constitutional/EENT/Resp/CV/GI//MS/Neuro/Skin/Heme-Lymph-Imm. Encounter Diagnoses   Name Primary?     Lumbar radiculopathy Yes    Lumbar facet arthropathy     DDD (degenerative disc disease), lumbar      PLAN:  #1

## 2020-12-02 ENCOUNTER — HOSPITAL ENCOUNTER (OUTPATIENT)
Dept: MRI IMAGING | Age: 55
Discharge: HOME | End: 2020-12-02
Payer: COMMERCIAL

## 2020-12-02 PROCEDURE — 72148 MRI LUMBAR SPINE W/O DYE: CPT

## 2020-12-04 RX ORDER — GABAPENTIN 100 MG/1
CAPSULE ORAL
Qty: 270 CAPSULE | Refills: 0 | Status: SHIPPED | OUTPATIENT
Start: 2020-12-04 | End: 2021-12-14 | Stop reason: SDUPTHER

## 2020-12-17 RX ORDER — PROPRANOLOL HYDROCHLORIDE 80 MG/1
CAPSULE, EXTENDED RELEASE ORAL
Qty: 90 CAPSULE | Refills: 3 | Status: SHIPPED | OUTPATIENT
Start: 2020-12-17 | End: 2022-04-04

## 2020-12-17 RX ORDER — HYDROCHLOROTHIAZIDE 25 MG/1
25 TABLET ORAL DAILY
Qty: 90 TABLET | Refills: 1 | Status: SHIPPED | OUTPATIENT
Start: 2020-12-17 | End: 2021-05-17

## 2020-12-17 RX ORDER — GABAPENTIN 100 MG/1
CAPSULE ORAL
Qty: 270 CAPSULE | Refills: 0 | OUTPATIENT
Start: 2020-12-17

## 2020-12-17 RX ORDER — TRAZODONE HYDROCHLORIDE 150 MG/1
150 TABLET ORAL NIGHTLY
Qty: 90 TABLET | Refills: 0 | Status: SHIPPED | OUTPATIENT
Start: 2020-12-17 | End: 2021-03-03

## 2020-12-17 RX ORDER — OMEPRAZOLE 40 MG/1
40 CAPSULE, DELAYED RELEASE ORAL 2 TIMES DAILY
Qty: 180 CAPSULE | Refills: 1 | Status: SHIPPED | OUTPATIENT
Start: 2020-12-17 | End: 2021-07-06

## 2020-12-17 RX ORDER — FENOFIBRATE 160 MG/1
160 TABLET ORAL DAILY
Qty: 90 TABLET | Refills: 1 | Status: SHIPPED | OUTPATIENT
Start: 2020-12-17 | End: 2021-05-17

## 2020-12-17 NOTE — TELEPHONE ENCOUNTER
Last appointment: 11/9/2020  Next appointment: 12/17/2020  Last refill: 12/04/2020    Verified with Tobias Pop and duplicate request.  Refilled 12/04/2020

## 2020-12-18 RX ORDER — NAPROXEN 500 MG/1
TABLET ORAL
Qty: 180 TABLET | Refills: 0 | Status: SHIPPED | OUTPATIENT
Start: 2020-12-18 | End: 2021-03-05

## 2020-12-28 RX ORDER — CLONIDINE HYDROCHLORIDE 0.2 MG/1
TABLET ORAL
Qty: 90 TABLET | Refills: 0 | Status: SHIPPED | OUTPATIENT
Start: 2020-12-28 | End: 2021-03-15

## 2021-02-01 ENCOUNTER — TELEPHONE (OUTPATIENT)
Dept: SURGERY | Age: 56
End: 2021-02-01

## 2021-02-01 NOTE — TELEPHONE ENCOUNTER
Alfredo,     Ms. Monika Leal would like to schedule a visit. Both of her former providers for her breast issues are no longer with [facility] and she is hoping to become established with Dr. Apollo Flores sometime soon. In the past, she received routine MRI's and Mammogram's of her breasts every six months. She is currently having some pain and needs to be seen soon to have one of these done. Please provide Mayra Palacios with a phone call back. She would like to be schedule for her visit as soon as possible.

## 2021-02-25 ENCOUNTER — TELEPHONE (OUTPATIENT)
Dept: GYNECOLOGY | Age: 56
End: 2021-02-25

## 2021-02-25 NOTE — TELEPHONE ENCOUNTER
Patient calling today to schedule appt she had to NO SHOWED  on Tuesday DUE to illness , but now she is having breast pain on right side, please review as to where we can add her on at, next week she is not available anything after march 8th. Patient can be reached [phone].

## 2021-03-03 DIAGNOSIS — G47.00 INSOMNIA, UNSPECIFIED TYPE: ICD-10-CM

## 2021-03-03 RX ORDER — TRAZODONE HYDROCHLORIDE 150 MG/1
TABLET ORAL
Qty: 90 TABLET | Refills: 0 | Status: SHIPPED | OUTPATIENT
Start: 2021-03-03 | End: 2021-05-17

## 2021-03-03 NOTE — TELEPHONE ENCOUNTER
Last appointment: 11/9/2020  Next appointment: Visit date not found  Last refill: 12/17/20      No follow up given at last OV

## 2021-03-05 RX ORDER — NAPROXEN 500 MG/1
TABLET ORAL
Qty: 180 TABLET | Refills: 0 | Status: SHIPPED | OUTPATIENT
Start: 2021-03-05 | End: 2021-05-21

## 2021-03-08 NOTE — TELEPHONE ENCOUNTER
Please put patient in at 1:40 pm on 3/16/21. I tried to put it in but it has a block on it? rut to take this out? PA of appointment time.

## 2021-03-08 NOTE — TELEPHONE ENCOUNTER
Patient returned call saying that she would be open to a last min appt this week. Offered her a slow tomorrow (3/9/21) she said she could not do tomorrow but any other day would work.  Please review

## 2021-03-10 ENCOUNTER — TELEPHONE (OUTPATIENT)
Dept: SURGERY | Age: 56
End: 2021-03-10

## 2021-03-14 DIAGNOSIS — Z78.0 MENOPAUSE: ICD-10-CM

## 2021-03-15 RX ORDER — CLONIDINE HYDROCHLORIDE 0.2 MG/1
TABLET ORAL
Qty: 90 TABLET | Refills: 0 | Status: SHIPPED | OUTPATIENT
Start: 2021-03-15 | End: 2021-06-01

## 2021-03-16 ENCOUNTER — OFFICE VISIT (OUTPATIENT)
Dept: GYNECOLOGY | Age: 56
End: 2021-03-16
Payer: COMMERCIAL

## 2021-03-16 ENCOUNTER — IMMUNIZATION (OUTPATIENT)
Dept: FAMILY MEDICINE CLINIC | Age: 56
End: 2021-03-16
Payer: COMMERCIAL

## 2021-03-16 VITALS
RESPIRATION RATE: 17 BRPM | HEART RATE: 81 BPM | SYSTOLIC BLOOD PRESSURE: 107 MMHG | TEMPERATURE: 97.8 F | BODY MASS INDEX: 41.35 KG/M2 | HEIGHT: 64 IN | DIASTOLIC BLOOD PRESSURE: 68 MMHG | WEIGHT: 242.2 LBS

## 2021-03-16 DIAGNOSIS — N64.4 BREAST PAIN, RIGHT: ICD-10-CM

## 2021-03-16 DIAGNOSIS — Z01.419 WELL WOMAN EXAM WITH ROUTINE GYNECOLOGICAL EXAM: Primary | ICD-10-CM

## 2021-03-16 PROCEDURE — 99396 PREV VISIT EST AGE 40-64: CPT | Performed by: OBSTETRICS & GYNECOLOGY

## 2021-03-16 ASSESSMENT — ENCOUNTER SYMPTOMS
RESPIRATORY NEGATIVE: 1
EYES NEGATIVE: 1
GASTROINTESTINAL NEGATIVE: 1

## 2021-03-16 NOTE — PROGRESS NOTES
Subjective:      Patient ID: Daniela Ramires is a 60 y.o. female. Patient is here for annual. Patient with breast pain. Gynecologic Exam        Review of Systems   Constitutional: Negative. HENT: Negative. Eyes: Negative. Respiratory: Negative. Cardiovascular: Negative. Gastrointestinal: Negative. Genitourinary: Negative. Musculoskeletal: Negative. Skin: Negative. Neurological: Negative. Psychiatric/Behavioral: Negative.       Date of Birth 1965  Past Medical History:   Diagnosis Date    Adenomatous colon polyp 11/2017    Dr. Juanita Arguello 1/14/2014    Asthma     seasonal asthma    Bilateral kidney stones 12/27/2018    Cellulitis, lip     mrsa    Cervical spinal stenosis     Cholelithiasis 06/2018    Clotting disorder (HCC)     blood clot in leg as teenager bcp    Crohn's disease ([address]     DDD (degenerative disc disease), cervical     DDD (degenerative disc disease), lumbar     Depression 1/14/2014    DVT, lower extremity ([address] 1979    Due to birth control pills    Fibrocystic breast     Fibromyalgia 10/15/2013    GERD (gastroesophageal reflux disease)     Hormone disorder     Hx MRSA infection     pt states she has had several episodes of mrsa infections recently on stomach, which was tx'ed w/ antibiotics and is now (4/2013) scabbed over      Hyperlipidemia     Hypertension     Insomnia     interruption insomnia    Migraine     MS (multiple sclerosis) ([address] 6/2010    Multinodular goiter     Nausea & vomiting     Osteoarthritis     hips and shoulders    Polyp of colon, adenomatous 11/22/2017    PONV (postoperative nausea and vomiting)     severe    Rash     Right leg DVT ([address]     10years of age   Scheira Gemma Sleep apnea     CPAP set at 15, 11    Spondylolisthesis of lumbar region     Thyroid nodule 6/13/2013    Ureteral stone 12/16/2018    with right hydronephrosis    Ureterolithiasis 12/16/2018    with right hydronephrosis    Urinary incontinence  Venous insufficiency 13    insufficiency & reflux of legs    Weakness of left arm     DUE TO MS     Past Surgical History:   Procedure Laterality Date    BLADDER SUSPENSION      BREAST SURGERY      rt breast lumpectomy-atypical    COLONOSCOPY  3/13/2013    Dr. Leonides Benson    partial     OTHER SURGICAL HISTORY Bilateral 2013    Incision and Drainage of abcess lower lip    OTHER SURGICAL HISTORY N/A 2016    OPEN REPAIR OF VENTRAL HERNIA             SALPINGO-OOPHORECTOMY  2013    SKIN BIOPSY       OB History    Para Term  AB Living   3 3 3     3   SAB TAB Ectopic Molar Multiple Live Births             3      # Outcome Date GA Lbr Shabbir/2nd Weight Sex Delivery Anes PTL Lv   3 Term 12 37w0d   M Vag-Spont   ЮЛИЯ   2 Term 26 37w0d   M Vag-Spont   ЮЛИЯ   1 Term 0 37w0d   F Vag-Spont   ЮЛИЯ     Social History     Socioeconomic History    Marital status:      Spouse name: Not on file    Number of children: Not on file    Years of education: Not on file    Highest education level: Not on file   Occupational History    Not on file   Social Needs    Financial resource strain: Not on file    Food insecurity     Worry: Not on file     Inability: Not on file    Transportation needs     Medical: Not on file     Non-medical: Not on file   Tobacco Use    Smoking status: Former Smoker     Packs/day: 0.25     Years: 5.00     Pack years: 1.25     Types: Cigarettes     Quit date: 1982     Years since quittin.6    Smokeless tobacco: Former User   Substance and Sexual Activity    Alcohol use: No    Drug use: No    Sexual activity: Yes     Partners: Male   Lifestyle    Physical activity     Days per week: Not on file     Minutes per session: Not on file    Stress: Not on file   Relationships    Social connections     Talks on phone: Not on file     Gets together: Not on file     Attends Restorationist service: Not on file     Active member of club or organization: Not on file     Attends meetings of clubs or organizations: Not on file     Relationship status: Not on file    Intimate partner violence     Fear of current or ex partner: Not on file     Emotionally abused: Not on file     Physically abused: Not on file     Forced sexual activity: Not on file   Other Topics Concern    Not on file   Social History Narrative    Not on file     Allergies   Allergen Reactions    Bactrim [Sulfamethoxazole-Trimethoprim] Other (See Comments)     Put kidney into failure patient almost       Outpatient Medications Marked as Taking for the 3/16/21 encounter (Office Visit) with Sophie Woodson MD   Medication Sig Dispense Refill    cloNIDine (CATAPRES) 0.2 MG tablet TAKE 1 TABLET EVERY DAY 90 tablet 0    naproxen (NAPROSYN) 500 MG tablet TAKE 1 TABLET TWICE DAILY WITH FOOD 180 tablet 0    traZODone (DESYREL) 150 MG tablet TAKE 1 TABLET EVERY NIGHT 90 tablet 0    propranolol (INDERAL LA) 80 MG extended release capsule TAKE 1 CAPSULE EVERY DAY 90 capsule 3    omeprazole (PRILOSEC) 40 MG delayed release capsule Take 1 capsule by mouth 2 times daily 180 capsule 1    fenofibrate (TRIGLIDE) 160 MG tablet Take 1 tablet by mouth daily TAKE 1 TABLET EVERY DAY 90 tablet 1    hydroCHLOROthiazide (HYDRODIURIL) 25 MG tablet Take 1 tablet by mouth daily TAKE 1 TABLET EVERY DAY 90 tablet 1    albuterol sulfate HFA (VENTOLIN HFA) 108 (90 Base) MCG/ACT inhaler Inhale 2 puffs into the lungs every 6 hours as needed for Wheezing 3 Inhaler 3    baclofen (LIORESAL) 10 MG tablet Take 1 tablet by mouth 3 times daily 30 tablet 0    SYMBICORT 160-4.5 MCG/ACT AERO INHALE 2 PUFFS INTO THE LUNGS 2 TIMES DAILY 3 Inhaler 3    Cholecalciferol (VITAMIN D3) 50 MCG (2000 UT) CAPS Take 1 capsule by mouth daily      ferrous sulfate (IRON 325) 325 (65 Fe) MG tablet Take 325 mg by mouth daily (with breakfast)      albuterol sulfate Breath sounds: Normal breath sounds. No wheezing or rales. Chest:      Chest wall: No tenderness. Breasts:         Right: No swelling, bleeding, inverted nipple, mass, nipple discharge, skin change or tenderness. Left: No swelling, bleeding, inverted nipple, mass, nipple discharge, skin change or tenderness. Abdominal:      General: Abdomen is flat. There is no distension. Palpations: Abdomen is soft. There is no hepatomegaly or mass. Tenderness: There is no abdominal tenderness. There is no guarding or rebound. Hernia: No hernia is present. There is no hernia in the left inguinal area. Genitourinary:     Exam position: Lithotomy position. Labia:         Right: No rash, tenderness, lesion or injury. Left: No rash, tenderness, lesion or injury. Urethra: No prolapse, urethral pain, urethral swelling or urethral lesion. Vagina: Normal. No signs of injury and foreign body. No vaginal discharge, erythema, tenderness, bleeding or lesions. Adnexa: Right adnexa normal and left adnexa normal.        Right: No mass, tenderness or fullness. Left: No mass, tenderness or fullness. Rectum: Normal. Guaiac result negative. No mass, tenderness, anal fissure, external hemorrhoid or internal hemorrhoid. Normal anal tone. Comments: Normal urethral meatus, nl urethra, nl bladder. Musculoskeletal: Normal range of motion. General: No tenderness. Lymphadenopathy:      Cervical: No cervical adenopathy. Skin:     General: Skin is warm and dry. Neurological:      General: No focal deficit present. Mental Status: She is alert and oriented to person, place, and time. Mental status is at baseline. Deep Tendon Reflexes: Reflexes are normal and symmetric. Psychiatric:         Mood and Affect: Mood normal.         Behavior: Behavior normal.         Thought Content:  Thought content normal.         Judgment: Judgment normal. Assessment:      1. Annual  2. Menopause  3. Right breast pain      Plan:      1. Pap, calcium, exercise, mammogram, hemocult negative  2. Stable  3. Right breast US, right diagnostic mammogram. No mass felt      Patient asked about Covid Vaccine. Checked with Dr. Bee Edmonds. He approved. Melonie trying to set up for patient.      Debora Pineda MD

## 2021-03-17 PROCEDURE — 0001A COVID-19, PFIZER VACCINE 30MCG/0.3ML DOSE: CPT | Performed by: FAMILY MEDICINE

## 2021-03-17 PROCEDURE — 91300 COVID-19, PFIZER VACCINE 30MCG/0.3ML DOSE: CPT | Performed by: FAMILY MEDICINE

## 2021-04-06 ENCOUNTER — IMMUNIZATION (OUTPATIENT)
Dept: FAMILY MEDICINE CLINIC | Age: 56
End: 2021-04-06
Payer: COMMERCIAL

## 2021-04-08 PROCEDURE — 91300 COVID-19, PFIZER VACCINE 30MCG/0.3ML DOSE: CPT | Performed by: NURSE PRACTITIONER

## 2021-04-08 PROCEDURE — 0002A COVID-19, PFIZER VACCINE 30MCG/0.3ML DOSE: CPT | Performed by: NURSE PRACTITIONER

## 2021-05-17 DIAGNOSIS — G47.00 INSOMNIA, UNSPECIFIED TYPE: ICD-10-CM

## 2021-05-17 RX ORDER — FENOFIBRATE 160 MG/1
TABLET ORAL
Qty: 90 TABLET | Refills: 1 | Status: SHIPPED | OUTPATIENT
Start: 2021-05-17 | End: 2021-12-15 | Stop reason: SDUPTHER

## 2021-05-17 RX ORDER — TRAZODONE HYDROCHLORIDE 150 MG/1
TABLET ORAL
Qty: 90 TABLET | Refills: 0 | Status: SHIPPED | OUTPATIENT
Start: 2021-05-17 | End: 2021-07-06

## 2021-05-17 RX ORDER — HYDROCHLOROTHIAZIDE 25 MG/1
TABLET ORAL
Qty: 90 TABLET | Refills: 1 | Status: SHIPPED | OUTPATIENT
Start: 2021-05-17 | End: 2021-12-14 | Stop reason: SDUPTHER

## 2021-05-17 NOTE — TELEPHONE ENCOUNTER
Last appointment: 11/9/2020  Next appointment: 5/17/2021  Last refill:   Triglide 90 with 1 12/17/2020  Hydrodiuril 90 with 1 12/17/2020

## 2021-05-20 ENCOUNTER — HOSPITAL ENCOUNTER (OUTPATIENT)
Dept: MAMMOGRAPHY | Age: 56
Discharge: HOME | End: 2021-05-20
Payer: COMMERCIAL

## 2021-05-20 ENCOUNTER — HOSPITAL ENCOUNTER (OUTPATIENT)
Dept: ULTRASOUND IMAGING | Age: 56
Discharge: HOME | End: 2021-05-20
Payer: COMMERCIAL

## 2021-05-20 ENCOUNTER — APPOINTMENT (OUTPATIENT)
Dept: ULTRASOUND IMAGING | Age: 56
End: 2021-05-20
Payer: COMMERCIAL

## 2021-05-20 VITALS — WEIGHT: 242 LBS | BODY MASS INDEX: 41.32 KG/M2 | HEIGHT: 64 IN

## 2021-05-20 DIAGNOSIS — N64.4 BREAST PAIN: ICD-10-CM

## 2021-05-20 DIAGNOSIS — N64.4 BREAST PAIN, RIGHT: ICD-10-CM

## 2021-05-20 DIAGNOSIS — N64.4 PAIN IN BREAST: ICD-10-CM

## 2021-05-20 PROCEDURE — G0279 TOMOSYNTHESIS, MAMMO: HCPCS

## 2021-05-20 PROCEDURE — 76642 ULTRASOUND BREAST LIMITED: CPT

## 2021-05-21 RX ORDER — NAPROXEN 500 MG/1
TABLET ORAL
Qty: 180 TABLET | Refills: 0 | Status: SHIPPED | OUTPATIENT
Start: 2021-05-21 | End: 2021-07-06

## 2021-05-21 NOTE — TELEPHONE ENCOUNTER
Last appointment: 11/9/2020  Next appointment: Visit date not found  Last refill: 180 with 0  03/05/2021  Sent Visual [employer] 360 message to schedule due/overdue appointment.

## 2021-05-30 DIAGNOSIS — Z78.0 MENOPAUSE: ICD-10-CM

## 2021-06-01 RX ORDER — CLONIDINE HYDROCHLORIDE 0.2 MG/1
TABLET ORAL
Qty: 90 TABLET | Refills: 0 | Status: SHIPPED | OUTPATIENT
Start: 2021-06-01 | End: 2021-07-06

## 2021-07-06 DIAGNOSIS — G47.00 INSOMNIA, UNSPECIFIED TYPE: ICD-10-CM

## 2021-07-06 DIAGNOSIS — Z78.0 MENOPAUSE: ICD-10-CM

## 2021-07-06 RX ORDER — NAPROXEN 500 MG/1
TABLET ORAL
Qty: 180 TABLET | Refills: 0 | Status: SHIPPED | OUTPATIENT
Start: 2021-07-06 | End: 2021-12-15 | Stop reason: SDUPTHER

## 2021-07-06 RX ORDER — CLONIDINE HYDROCHLORIDE 0.2 MG/1
TABLET ORAL
Qty: 90 TABLET | Refills: 0 | Status: SHIPPED | OUTPATIENT
Start: 2021-07-06 | End: 2021-10-21

## 2021-07-06 RX ORDER — TRAZODONE HYDROCHLORIDE 150 MG/1
TABLET ORAL
Qty: 90 TABLET | Refills: 0 | Status: SHIPPED | OUTPATIENT
Start: 2021-07-06 | End: 2021-12-15 | Stop reason: SDUPTHER

## 2021-07-06 RX ORDER — OMEPRAZOLE 40 MG/1
CAPSULE, DELAYED RELEASE ORAL
Qty: 180 CAPSULE | Refills: 1 | Status: SHIPPED | OUTPATIENT
Start: 2021-07-06 | End: 2021-12-13

## 2021-07-06 NOTE — TELEPHONE ENCOUNTER
Last appointment: 11/9/2020  Next appointment: Visit date not found  Last refill: 5/21/21,5/17/21 and 12/17/20

## 2021-08-31 LAB — SARS-COV-2: POSITIVE

## 2021-09-13 RX ORDER — ALBUTEROL SULFATE 90 UG/1
AEROSOL, METERED RESPIRATORY (INHALATION)
Qty: 54 G | Refills: 2 | Status: SHIPPED | OUTPATIENT
Start: 2021-09-13

## 2021-10-11 RX ORDER — HYDROCHLOROTHIAZIDE 25 MG/1
TABLET ORAL
Qty: 90 TABLET | Refills: 1 | OUTPATIENT
Start: 2021-10-11

## 2021-10-11 RX ORDER — FENOFIBRATE 160 MG/1
TABLET ORAL
Qty: 90 TABLET | Refills: 1 | OUTPATIENT
Start: 2021-10-11

## 2021-10-11 NOTE — TELEPHONE ENCOUNTER
Last appointment: 11/9/2020  Next appointment: Visit date not found  Last refill: 5/17/2021, 90 +1  No Return Date Given at [place], [place]

## 2021-10-13 DIAGNOSIS — G47.00 INSOMNIA, UNSPECIFIED TYPE: ICD-10-CM

## 2021-10-14 RX ORDER — NAPROXEN 500 MG/1
TABLET ORAL
Qty: 30 TABLET | Refills: 0 | OUTPATIENT
Start: 2021-10-14

## 2021-10-14 RX ORDER — TRAZODONE HYDROCHLORIDE 150 MG/1
TABLET ORAL
Qty: 15 TABLET | Refills: 0 | OUTPATIENT
Start: 2021-10-14

## 2021-10-21 DIAGNOSIS — Z78.0 MENOPAUSE: ICD-10-CM

## 2021-10-21 RX ORDER — CLONIDINE HYDROCHLORIDE 0.2 MG/1
TABLET ORAL
Qty: 90 TABLET | Refills: 0 | Status: SHIPPED | OUTPATIENT
Start: 2021-10-21 | End: 2022-01-06

## 2021-11-23 ENCOUNTER — TELEMEDICINE (OUTPATIENT)
Dept: INTERNAL MEDICINE CLINIC | Age: 56
End: 2021-11-23
Payer: COMMERCIAL

## 2021-11-23 DIAGNOSIS — R05.9 COUGH: ICD-10-CM

## 2021-11-23 DIAGNOSIS — J01.00 ACUTE MAXILLARY SINUSITIS, RECURRENCE NOT SPECIFIED: Primary | ICD-10-CM

## 2021-11-23 PROCEDURE — 99213 OFFICE O/P EST LOW 20 MIN: CPT | Performed by: INTERNAL MEDICINE

## 2021-11-23 RX ORDER — GUAIFENESIN AND DEXTROMETHORPHAN HYDROBROMIDE 600; 30 MG/1; MG/1
1 TABLET, EXTENDED RELEASE ORAL 2 TIMES DAILY
Qty: 28 TABLET | Refills: 0 | Status: SHIPPED | COMMUNITY
Start: 2021-11-23

## 2021-11-23 RX ORDER — FLUTICASONE PROPIONATE 50 MCG
2 SPRAY, SUSPENSION (ML) NASAL DAILY
Qty: 16 G | Refills: 0 | Status: SHIPPED | OUTPATIENT
Start: 2021-11-23

## 2021-11-23 RX ORDER — AMOXICILLIN AND CLAVULANATE POTASSIUM 875; 125 MG/1; MG/1
1 TABLET, FILM COATED ORAL 2 TIMES DAILY
Qty: 14 TABLET | Refills: 0 | Status: SHIPPED | OUTPATIENT
Start: 2021-11-23 | End: 2021-11-30

## 2021-11-23 RX ORDER — ECHINACEA PURPUREA EXTRACT 125 MG
3 TABLET ORAL 3 TIMES DAILY
COMMUNITY
Start: 2021-11-23

## 2021-11-23 NOTE — PROGRESS NOTES
2021    [facility]) Physicians  Internal Medicine  Patient Encounter  Lynn Mulligan D.O., [address] -- Audio/Visual (During [facility] emergency)      I discussed at this telephone/video visit is a nontraditional type of visit that we are conducting in lieu of an office visit to minimize patient risk during the coronavirus pandemic. I discussed with the patient that I would not be able to perform a full physical examination, but that in most other respects the visit would be beneficial to his/her continued medical care. He/She again gave verbal consent for us to conduct this type of visit. Chief Complaint   Patient presents with    Cough     x1 week, coughing up mucus, greenish color, possible bronchitis    Congestion     x1 week, sneezing, runny nose         HPI:    Day Rosado (:  1965) has requested an audio/video evaluation for the following concern(s): Cough    60 y.o. female being evaluated via virtual video visit due to the coronavirus pandemic emergency and public health crisis and inability to see the patient face-to-face in the office. Patient being evaluated today with complaint of nasal congestion and cough. Symptoms have been worsening over the last week. She states the nasal discharge has turned green. She also reports postnasal drainage which is led to her coughing up green sputum. She denies any wheezing or shortness of breath. She denies any nausea, vomiting, diarrhea. She further denies any fever, chills, sweats, she denies any change in taste or smell. She went to the [facility] this past Friday, 4 days ago for a Covid test.  This was negative. Patient did have a Covid infection at the end of August of this year just 3 months ago. She does describe increasing sinus pressure.       Past Medical History:   Diagnosis Date    Adenomatous colon polyp 2017    Dr. Shamika Sun 2014    Asthma     seasonal asthma    Atypical ductal hyperplasia, breast     Bilateral kidney stones 12/27/2018    Cellulitis, lip     mrsa    Cervical spinal stenosis     Cholelithiasis 06/2018    Clotting disorder (HCC)     blood clot in leg as teenager bcp    COVID-19 virus infection 08/2021    Crohn's disease ([address]     DDD (degenerative disc disease), cervical     DDD (degenerative disc disease), lumbar     Depression 01/14/2014    DVT, lower extremity ([address] 1979    Due to birth control pills    Fibrocystic breast     Fibromyalgia 10/15/2013    GERD (gastroesophageal reflux disease)     Hormone disorder     Hx MRSA infection     pt states she has had several episodes of mrsa infections recently on stomach, which was tx'ed w/ antibiotics and is now (4/2013) scabbed over      Hyperlipidemia     Hypertension     Insomnia     interruption insomnia    Migraine     MS (multiple sclerosis) ([address] 06/2010    Multinodular goiter     Nausea & vomiting     Osteoarthritis     hips and shoulders    Polyp of colon, adenomatous 11/22/2017    PONV (postoperative nausea and vomiting)     severe    Rash     Right leg DVT ([address]     10years of age   Germán Horvath Sleep apnea     CPAP set at 15, 11    Spondylolisthesis of lumbar region     Thyroid nodule 06/13/2013    Ureteral stone 12/16/2018    with right hydronephrosis    Ureterolithiasis 12/16/2018    with right hydronephrosis    Urinary incontinence     Venous insufficiency 03/08/2013    insufficiency & reflux of legs    Weakness of left arm     DUE TO MS       Medication Sig   cloNIDine (CATAPRES) 0.2 MG tablet TAKE 1 TABLET EVERY DAY   albuterol sulfate  (90 Base) MCG/ACT inhaler INHALE 2 PUFFS INTO THE LUNGS EVERY 6 HOURS AS NEEDED FOR WHEEZING   omeprazole (PRILOSEC) 40 MG delayed release capsule TAKE 1 CAPSULE TWICE DAILY   traZODone (DESYREL) 150 MG tablet TAKE 1 TABLET EVERY NIGHT   naproxen (NAPROSYN) 500 MG tablet TAKE 1 TABLET TWICE DAILY WITH FOOD hydroCHLOROthiazide (HYDRODIURIL) 25 MG tablet TAKE 1 TABLET EVERY DAY   fenofibrate (TRIGLIDE) 160 MG tablet TAKE 1 TABLET EVERY DAY   propranolol (INDERAL LA) 80 MG extended release capsule TAKE 1 CAPSULE EVERY DAY   albuterol sulfate HFA (VENTOLIN HFA) 108 (90 Base) MCG/ACT inhaler Inhale 2 puffs into the lungs every 6 hours as needed for Wheezing   baclofen (LIORESAL) 10 MG tablet Take 1 tablet by mouth 3 times daily   SYMBICORT 160-4.5 MCG/ACT AERO INHALE 2 PUFFS INTO THE LUNGS 2 TIMES DAILY   Cholecalciferol (VITAMIN D3) 50 MCG (2000 UT) CAPS Take 1 capsule by mouth daily   ferrous sulfate (IRON 325) 325 (65 Fe) MG tablet Take 325 mg by mouth daily (with breakfast)   albuterol sulfate  (90 Base) MCG/ACT inhaler INHALE 2 PUFFS INTO THE LUNGS EVERY 6 HOURS AS NEEDED FOR WHEEZING, SHORTNESS OF BREATH OR COUGHING   modafinil (PROVIGIL) 200 MG tablet Take 200 mg by mouth daily. DULoxetine (CYMBALTA) 60 MG capsule Take 60 mg by mouth 2 times daily   Peginterferon Beta-1a (PLEGRIDY) 125 MCG/0.5ML SOPN Inject 125 mLs into the skin daily 1 shot every 2 weeks   gabapentin (NEURONTIN) 100 MG capsule TAKE 1 CAPSULE TID DAILY         Review of Systems  As per HPI      PHYSICAL EXAMINATION:  .  Vital Signs: (As obtained by patient/caregiver or practitioner observation)    Patient-Reported Vitals 11/23/2021   Patient-Reported Weight 242 lbs   Patient-Reported Height 5'4.0 in   Patient-Reported Systolic -   Patient-Reported Diastolic -   Patient-Reported Pulse -   Patient-Reported Temperature -   Patient-Reported SpO2 -          Wt Readings from Last 3 Encounters:   05/20/21 242 lb (109.8 kg)   03/16/21 242 lb 3.2 oz (109.9 kg)   10/20/20 236 lb (107 kg)     BP Readings from Last 3 Encounters:   03/16/21 107/68   10/20/20 115/60   03/23/20 130/78           Physical Exam  Constitutional:       General: She is not in acute distress. Appearance: Normal appearance. She is not ill-appearing.    HENT:      Nose: Comments: Sounds nasally congested  Pulmonary:      Effort: Pulmonary effort is normal. No respiratory distress. Comments: Dry cough noted during the exam.  Neurological:      Mental Status: She is alert and oriented to person, place, and time. Psychiatric:         Thought Content: Thought content normal.           Other pertinent observable physical exam findings-     Due to this being a TeleHealth encounter, evaluation of the following organ systems is limited: Vitals/Constitutional/EENT/Resp/CV/GI//MS/Neuro/Skin/Heme-Lymph-Imm. ASSESSMENT/PLAN:  1. Acute maxillary sinusitis, recurrence not specified  Advised patient stay well-hydrated  She can take a probiotic to help prevent diarrhea from the antibiotic. Patient advised to call should symptoms persist, worsen or if new symptoms develop. - amoxicillin-clavulanate (AUGMENTIN) 875-125 MG per tablet; Take 1 tablet by mouth 2 times daily for 7 days  Dispense: 14 tablet; Refill: 0  - Dextromethorphan-guaiFENesin (MUCINEX DM)  MG TB12; Take 1 tablet by mouth 2 times daily  Dispense: 28 tablet; Refill: 0  - sodium chloride (OCEAN) 0.65 % nasal spray; 3 sprays by Nasal route 3 times daily  - fluticasone (FLONASE) 50 MCG/ACT nasal spray; 2 sprays by Each Nostril route daily  Dispense: 16 g; Refill: 0    2. Cough  As above  - Dextromethorphan-guaiFENesin (MUCINEX DM)  MG TB12; Take 1 tablet by mouth 2 times daily  Dispense: 28 tablet; Refill: 0      No follow-ups on file. An  electronic signature was used to authenticate this note.     --Jimmie Carpio DO on 11/23/2021 at 1:20 PM    119}    Pursuant to the emergency declaration under the [address] waiver authority and the [employer] Act, this Virtual  Visit was conducted, with patient's consent, to reduce the patient's risk of exposure to COVID-19 and provide continuity of care for an established

## 2021-12-10 ENCOUNTER — TELEPHONE (OUTPATIENT)
Dept: INTERNAL MEDICINE CLINIC | Age: 56
End: 2021-12-10

## 2021-12-10 NOTE — TELEPHONE ENCOUNTER
----- Message from Kingdavid Quirogasbury sent at 12/10/2021  2:32 PM EST -----  Subject: Appointment Request    Reason for Call: Flu Shot    QUESTIONS  Type of Appointment? Established Patient  Reason for appointment request? No appointments available during search  Additional Information for Provider? PT requesting Flu Shot-  ---------------------------------------------------------------------------  --------------  Gerri ROWE  What is the best way for the office to contact you? OK to leave message on   voicemail  Preferred Call Back Phone Number? [phone]  ---------------------------------------------------------------------------  --------------  SCRIPT ANSWERS  Relationship to Patient? Self   Is the Adult patient requesting a Flu Shot? Yes  Is the parent/patient requesting a Flu Shot for a child older than 6   months? No  Does the patient have a question for their provider regarding the flu   shot? No  Have you been diagnosed with, awaiting test results for, or told that you   are suspected of having COVID-19 (Coronavirus)? (If patient has tested   negative or was tested as a requirement for work, school, or travel and   not based on symptoms, answer no)? No  Within the past two weeks have you developed any of the following symptoms   (answer no if symptoms have been present longer than 2 weeks or began   more than 2 weeks ago)? Fever or Chills, Cough, Shortness of breath or   difficulty breathing, Loss of taste or smell, Sore throat, Nasal   congestion, Sneezing or runny nose, Fatigue or generalized body aches   (answer no if pain is specific to a body part e.g. back pain), Diarrhea,   Headache? No  Have you had close contact with someone with COVID-19 in the last 14 days? No  (Service Expert  click yes below to proceed with [employer] As Usual   Scheduling)?  Yes

## 2021-12-13 RX ORDER — OMEPRAZOLE 40 MG/1
CAPSULE, DELAYED RELEASE ORAL
Qty: 180 CAPSULE | Refills: 1 | Status: SHIPPED | OUTPATIENT
Start: 2021-12-13 | End: 2022-04-04

## 2021-12-13 NOTE — TELEPHONE ENCOUNTER
Last appointment: 11/23/2021  Next appointment: Visit date not found  Last refill: 7/6/2021  no return date given at last office visit

## 2021-12-14 DIAGNOSIS — G47.00 INSOMNIA, UNSPECIFIED TYPE: ICD-10-CM

## 2021-12-14 RX ORDER — GABAPENTIN 100 MG/1
CAPSULE ORAL
Qty: 270 CAPSULE | Refills: 0 | Status: SHIPPED | OUTPATIENT
Start: 2021-12-14 | End: 2022-04-04

## 2021-12-14 RX ORDER — FENOFIBRATE 160 MG/1
TABLET ORAL
Qty: 90 TABLET | Refills: 1 | Status: CANCELLED | OUTPATIENT
Start: 2021-12-14

## 2021-12-14 RX ORDER — NAPROXEN 500 MG/1
TABLET ORAL
Qty: 180 TABLET | Refills: 0 | Status: CANCELLED | OUTPATIENT
Start: 2021-12-14

## 2021-12-14 RX ORDER — TRAZODONE HYDROCHLORIDE 150 MG/1
TABLET ORAL
Qty: 90 TABLET | Refills: 0 | Status: CANCELLED | OUTPATIENT
Start: 2021-12-14

## 2021-12-15 DIAGNOSIS — G47.00 INSOMNIA, UNSPECIFIED TYPE: ICD-10-CM

## 2021-12-15 RX ORDER — BUDESONIDE AND FORMOTEROL FUMARATE DIHYDRATE 160; 4.5 UG/1; UG/1
2 AEROSOL RESPIRATORY (INHALATION) 2 TIMES DAILY
Qty: 3 EACH | Refills: 0 | Status: SHIPPED | OUTPATIENT
Start: 2021-12-15 | End: 2022-04-04

## 2021-12-15 RX ORDER — TRAZODONE HYDROCHLORIDE 150 MG/1
TABLET ORAL
Qty: 90 TABLET | Refills: 0 | Status: SHIPPED | OUTPATIENT
Start: 2021-12-15 | End: 2022-02-28

## 2021-12-15 RX ORDER — HYDROCHLOROTHIAZIDE 25 MG/1
25 TABLET ORAL DAILY
Qty: 90 TABLET | Refills: 0 | Status: SHIPPED | OUTPATIENT
Start: 2021-12-15 | End: 2022-02-28

## 2021-12-15 RX ORDER — FENOFIBRATE 160 MG/1
TABLET ORAL
Qty: 90 TABLET | Refills: 1 | Status: SHIPPED | OUTPATIENT
Start: 2021-12-15 | End: 2022-04-04

## 2021-12-15 RX ORDER — NAPROXEN 500 MG/1
TABLET ORAL
Qty: 180 TABLET | Refills: 0 | Status: SHIPPED | OUTPATIENT
Start: 2021-12-15 | End: 2022-02-28

## 2021-12-15 NOTE — TELEPHONE ENCOUNTER
Last appointment: 11/23/2021  Next appointment: Visit date not found  Last refill:  7/6/21  7/6/21  5/17/21  5/17/21  10/1/20

## 2021-12-22 ENCOUNTER — NURSE ONLY (OUTPATIENT)
Dept: INTERNAL MEDICINE CLINIC | Age: 56
End: 2021-12-22
Payer: COMMERCIAL

## 2021-12-22 PROCEDURE — 90674 CCIIV4 VAC NO PRSV 0.5 ML IM: CPT | Performed by: INTERNAL MEDICINE

## 2021-12-22 PROCEDURE — 90471 IMMUNIZATION ADMIN: CPT | Performed by: INTERNAL MEDICINE

## 2021-12-23 ENCOUNTER — TELEPHONE (OUTPATIENT)
Dept: INTERNAL MEDICINE CLINIC | Age: 56
End: 2021-12-23

## 2022-01-06 DIAGNOSIS — Z78.0 MENOPAUSE: ICD-10-CM

## 2022-01-06 RX ORDER — CLONIDINE HYDROCHLORIDE 0.2 MG/1
TABLET ORAL
Qty: 90 TABLET | Refills: 0 | Status: SHIPPED | OUTPATIENT
Start: 2022-01-06 | End: 2022-03-21

## 2022-01-14 ENCOUNTER — OFFICE VISIT (OUTPATIENT)
Dept: INTERNAL MEDICINE CLINIC | Age: 57
End: 2022-01-14
Payer: COMMERCIAL

## 2022-01-14 VITALS
OXYGEN SATURATION: 98 % | SYSTOLIC BLOOD PRESSURE: 136 MMHG | RESPIRATION RATE: 12 BRPM | DIASTOLIC BLOOD PRESSURE: 88 MMHG | WEIGHT: 231 LBS | HEART RATE: 97 BPM | HEIGHT: 64 IN | BODY MASS INDEX: 39.44 KG/M2

## 2022-01-14 DIAGNOSIS — R73.01 IMPAIRED FASTING GLUCOSE: ICD-10-CM

## 2022-01-14 DIAGNOSIS — I10 BENIGN ESSENTIAL HTN: Primary | ICD-10-CM

## 2022-01-14 DIAGNOSIS — G35 MULTIPLE SCLEROSIS (HCC): ICD-10-CM

## 2022-01-14 DIAGNOSIS — E78.5 DYSLIPIDEMIA: ICD-10-CM

## 2022-01-14 DIAGNOSIS — E04.2 MULTIPLE THYROID NODULES: ICD-10-CM

## 2022-01-14 DIAGNOSIS — M79.7 FIBROMYALGIA: ICD-10-CM

## 2022-01-14 PROBLEM — D72.829 LEUKOCYTOSIS: Status: RESOLVED | Noted: 2020-03-03 | Resolved: 2022-01-14

## 2022-01-14 PROBLEM — A41.9 SEPSIS (HCC): Status: RESOLVED | Noted: 2020-02-26 | Resolved: 2022-01-14

## 2022-01-14 PROBLEM — L03.113 RIGHT ARM CELLULITIS: Status: RESOLVED | Noted: 2020-03-03 | Resolved: 2022-01-14

## 2022-01-14 PROCEDURE — 99214 OFFICE O/P EST MOD 30 MIN: CPT | Performed by: INTERNAL MEDICINE

## 2022-01-14 ASSESSMENT — PATIENT HEALTH QUESTIONNAIRE - PHQ9
SUM OF ALL RESPONSES TO PHQ QUESTIONS 1-9: 7
2. FEELING DOWN, DEPRESSED OR HOPELESS: 1
4. FEELING TIRED OR HAVING LITTLE ENERGY: 1
5. POOR APPETITE OR OVEREATING: 1
1. LITTLE INTEREST OR PLEASURE IN DOING THINGS: 1
10. IF YOU CHECKED OFF ANY PROBLEMS, HOW DIFFICULT HAVE THESE PROBLEMS MADE IT FOR YOU TO DO YOUR WORK, TAKE CARE OF THINGS AT HOME, OR GET ALONG WITH OTHER PEOPLE: 1
SUM OF ALL RESPONSES TO PHQ9 QUESTIONS 1 & 2: 2
8. MOVING OR SPEAKING SO SLOWLY THAT OTHER PEOPLE COULD HAVE NOTICED. OR THE OPPOSITE, BEING SO FIGETY OR RESTLESS THAT YOU HAVE BEEN MOVING AROUND A LOT MORE THAN USUAL: 0
SUM OF ALL RESPONSES TO PHQ QUESTIONS 1-9: 7
9. THOUGHTS THAT YOU WOULD BE BETTER OFF DEAD, OR OF HURTING YOURSELF: 0
7. TROUBLE CONCENTRATING ON THINGS, SUCH AS READING THE NEWSPAPER OR WATCHING TELEVISION: 0
3. TROUBLE FALLING OR STAYING ASLEEP: 3
6. FEELING BAD ABOUT YOURSELF - OR THAT YOU ARE A FAILURE OR HAVE LET YOURSELF OR YOUR FAMILY DOWN: 0
SUM OF ALL RESPONSES TO PHQ QUESTIONS 1-9: 7
SUM OF ALL RESPONSES TO PHQ QUESTIONS 1-9: 7

## 2022-01-14 NOTE — PATIENT INSTRUCTIONS
Patient Education        Thyroid Nodules: Care Instructions  Your Care Instructions  Thyroid nodules are growths or lumps in the thyroid gland. Your thyroid is in the front of your neck. It controls how your body uses energy. You may have tests to see if the nodule is caused by cancer. Most nodules aren't cancer and don't cause problems. Many don't even need treatment. If you do have cancer, it can usually be cured. Treatment will probably include surgery. You may also get radioactive iodine treatment. If your thyroid can't make thyroid hormone after treatment, you can take a pill every day to replace the hormone. Follow-up care is a key part of your treatment and safety. Be sure to make and go to all appointments, and call your doctor if you are having problems. It's also a good idea to know your test results and keep a list of the medicines you take. How can you care for yourself at home? · Be safe with medicines. If you take thyroid hormone medicine:  ? Take it exactly as prescribed. Call your doctor if you think you are having a problem with your medicine. If you take the right amount and don't skip doses, you probably won't have side effects. ? Tell your doctor about any medicines you take. This includes over-the-counter medicines. When should you call for help? Call 911 anytime you think you may need emergency care. For example, call if:    · You lose consciousness. Call your doctor now or seek immediate medical care if:    · You have shortness of breath. Watch closely for changes in your health, and be sure to contact your doctor if:    · You have pain in your neck, jaw, or ear.     · You have problems swallowing.     · You feel weak and tired.     · You have nervousness, a fast heartbeat, hand tremors, problems sleeping, increased sweating, and weight loss.     · You do not feel better even though you are taking your medicine. Where can you learn more?   Go to [email or web address] and sign in to your [employer] account. Enter O168 in the [facility] box to learn more about \"Thyroid Nodules: Care Instructions. \"     If you do not have an account, please click on the \"Sign Up Now\" link. Current as of: July 28, 2021               Content Version: 13.1  © 2006-2021 [employer] and some sugar. Care instructions adapted under license by [facility] ([facility]). If you have questions about a medical condition or this instruction, always ask your healthcare professional. [place] [address] any warranty or liability for your use of this information. Patient Education        Prediabetes: Care Instructions  Overview     Prediabetes is a warning sign that you're at risk for getting type 2 diabetes. It means that your blood sugar is higher than it should be. But it's not high enough to be diabetes. The food you eat naturally turns into sugar. Your body uses the sugar for energy. Normally, an organ called the pancreas makes insulin. And insulin allows the sugar in your blood to get into your body's cells. But sometimes the body can't use insulin the right way. So the sugar stays in your blood instead. This is called insulin resistance. The buildup of sugar in your blood means you have prediabetes. The good news is that you may be able to prevent or delay diabetes. Making small lifestyle changes, like getting active and changing your eating habits, may help you get your blood sugar back to normal. You can work with your doctor to make a treatment plan. Follow-up care is a key part of your treatment and safety. Be sure to make and go to all appointments, and call your doctor if you are having problems. It's also a good idea to know your test results and keep a list of the medicines you take. How can you care for yourself at home? · Watch your weight. A healthy weight helps your body use insulin properly.   · Limit the amount of calories, sweets, and unhealthy fat you eat. Ask your doctor if you should see a dietitian. A registered dietitian can help you create meal plans that fit your lifestyle. · Get at least 30 minutes of exercise on most days of the week. Exercise helps control your blood sugar. It also helps you maintain a healthy weight. Walking is a good choice. You also may want to do other activities, such as running, swimming, cycling, or playing tennis or team sports. · Do not smoke. Smoking can make prediabetes worse. If you need help quitting, talk to your doctor about stop-smoking programs and medicines. These can increase your chances of quitting for good. · If your doctor prescribed medicines, take them exactly as prescribed. Call your doctor if you think you are having a problem with your medicine. You will get more details on the specific medicines your doctor prescribes. When should you call for help? Watch closely for changes in your health, and be sure to contact your doctor if:    · You have any symptoms of diabetes. These may include:  ? Being thirsty more often. ? Urinating more. ? Being hungrier. ? Losing weight. ? Being very tired. ? Having blurry vision.     · You have a wound that will not heal.     · You have an infection that will not go away.     · You have problems with your blood pressure.     · You want more information about diabetes and how you can keep from getting it. Where can you learn more? Go to [email or web address] org and sign in to your D2S account. Enter I222 in the [facility] box to learn more about \"Prediabetes: Care Instructions. \"     If you do not have an account, please click on the \"Sign Up Now\" link. Current as of: July 28, 2021               Content Version: 13.1  © 6673-5545 Healthwise, Incorporated. Care instructions adapted under license by [facility] ([facility]).  If you have questions about a medical condition or this instruction, always ask your healthcare professional. [address] any warranty or liability for your use of this information. Patient Education        Learning About Low-Carbohydrate Diets  What is a low-carbohydrate diet? A low-carbohydrate (or \"low-carb\") diet limits foods and drinks that have carbohydrates. This includes grains, fruits, milk and yogurt, and starchy vegetables like potatoes, beans, and corn. It also avoids foods and drinks that have added sugar. Instead, low-carb diets include foods that are high in protein and fat. Why might you follow a low-carb diet? Low-carb diets may be used for a variety of reasons, such as for weight loss. People who have diabetes may use a low-carb diet to help manage their blood sugar levels. What should you do before you start the diet? Talk to your doctor before you try any diet. This is even more important if you have health problems like kidney disease, heart disease, or diabetes. Your doctor may suggest that you meet with a registered dietitian. A dietitian can help you make an eating plan that works for you. What foods do you eat on a low-carb diet? On a low-carb diet, you choose foods that are high in protein and fat. Examples of these are:  · Meat, poultry, and fish. · Eggs. · Nuts, such as walnuts, pecans, almonds, and peanuts. · Peanut butter and other nut butters. · Tofu. · Avocado. · Isai Stair. · Non-starchy vegetables like broccoli, cauliflower, green beans, mushrooms, peppers, lettuce, and spinach. · Unsweetened non-dairy milks like almond milk and coconut milk. · Cheese, cottage cheese, and cream cheese. Current as of: September 8, 2021               Content Version: 13.1  © 2006-2021 Healthwise, [employer]. Care instructions adapted under license by [facility] ([facility]).  If you have questions about a medical condition or this instruction, always ask your healthcare professional. Domenic Thompson disclaims any warranty or liability for your use of this information.

## 2022-01-14 NOTE — PROGRESS NOTES
[facility]) Physicians  Internal Medicine  Patient Encounter  Jagjit Desir D.O., [facility]        Chief Complaint   Patient presents with   [facility]    Medication Check       HPI: 60 y.o. female seen today requesting her routine checkup regarding the status of current chronic medical problems listed below along with a medication review and reconciliation. HTN-- patient denies any symptoms suggestive of accelerated blood pressures or uncontrolled hypertension. She denies any lightheadedness, headaches, syncope. She denies any TIA or stroke related symptoms. IFG--patient endorses no symptoms suggestive of glucose toxicity. She denies any blurry vision or weight changes. Lab Results   Component Value Date    LABA1C 5.3 08/14/2020     No results found for: EAG      Asthma--Patient remains on Symbicort. She denies any cough or wheezing. She denies any nocturnal symptoms.     Depression/Anxiety/Adjustment disorder/Fibromyalgia-- She states the medication makes a big difference in her pain levels. Mood is OK. Her sleep is good. Trazodone works well. She denies any joint pain.     Hyperlipidemia--  Patient has a history of mixed dyslipidemia with primarily a triglyceride defect. She is on fenofibrate and tolerates the medication well. GERD--Patient remains on omeprazole 40 mg daily. Unfortunately she remains on NSAID treatment daily. She denies any heartburn or dysphagia. She denies any GI bleeding. MS-- She sees Dr. Brenda Chiang at [address] [place] [address] [address]. She remains on Peginterferon-Beta. Recent MRI stable. DEVIN--Patient remains compliant with BiPAP therapy. She also requires Provigil.         Past Medical History:   Diagnosis Date    Adenomatous colon polyp 11/2017    Dr. Stoney Spencer 01/14/2014    Asthma     seasonal asthma    Atypical ductal hyperplasia, breast     Bilateral kidney stones 12/27/2018    Cellulitis, lip     mrsa    Cervical spinal stenosis     Cholelithiasis 06/2018  Clotting disorder (HCC)     blood clot in leg as teenager bcp    COVID-19 virus infection 08/2021    Crohn's disease ([address]     DDD (degenerative disc disease), cervical     DDD (degenerative disc disease), lumbar     Depression 01/14/2014    DVT, lower extremity ([address] 1979    Due to birth control pills    Fibrocystic breast     Fibromyalgia 10/15/2013    GERD (gastroesophageal reflux disease)     Hormone disorder     Hx MRSA infection     pt states she has had several episodes of mrsa infections recently on stomach, which was tx'ed w/ antibiotics and is now (4/2013) scabbed over      Hyperlipidemia     Hypertension     Insomnia     interruption insomnia    Migraine     MS (multiple sclerosis) ([address] 06/2010    Multinodular goiter     Nausea & vomiting     Osteoarthritis     hips and shoulders    Polyp of colon, adenomatous 11/22/2017    PONV (postoperative nausea and vomiting)     severe    Rash     Right leg DVT ([address]     10years of age   Bretbrianna Norma Sleep apnea     CPAP set at 15, 11    Spondylolisthesis of lumbar region     Thyroid nodule 06/13/2013    Ureteral stone 12/16/2018    with right hydronephrosis    Ureterolithiasis 12/16/2018    with right hydronephrosis    Urinary incontinence     Venous insufficiency 03/08/2013    insufficiency & reflux of legs    Weakness of left arm     DUE TO MS         MEDICATIONS:  Medication Sig   cloNIDine (CATAPRES) 0.2 MG tablet TAKE 1 TABLET EVERY DAY   budesonide-formoterol (SYMBICORT) 160-4.5 MCG/ACT AERO Inhale 2 puffs into the lungs 2 times daily   hydroCHLOROthiazide (HYDRODIURIL) 25 MG tablet Take 1 tablet by mouth daily   fenofibrate (TRIGLIDE) 160 MG tablet TAKE 1 TABLET EVERY DAY   traZODone (DESYREL) 150 MG tablet TAKE 1 TABLET EVERY NIGHT   naproxen (NAPROSYN) 500 MG tablet TAKE 1 TABLET TWICE DAILY WITH FOOD   gabapentin (NEURONTIN) 100 MG capsule TAKE 1 CAPSULE TID DAILY   omeprazole (PRILOSEC) 40 MG delayed release capsule TAKE 1 ADELINA, EOMI, Oral cavity Clear,  TM's NL, anicteric. NECK: Supple. No thyromegaly. No JVD  LYMPH: No C/SC nodes. CV: S1 S2 NL, RRR. No murmurs, clicks or rubs. PULM: CTA, symmentric air exchange  EXT: No edema  GI: Abdomen is soft, NT, BS+, No hepatomegaly. No masses. NEURO: No focal or lateralizing deficits. Gait is normal.  No ataxia. Moves all extremities symmetrically. VASC:  No carotid bruits. Pedal pulses symmetric  SKIN:  No rashes or lesions of concern      ASSESSMENT/PLAN:    1. Benign essential HTN  Blood pressure is fairly well controlled for this 57-year-old female  Blood pressure could use some tightening. Recommended lifestyle modification including continued efforts with weight loss. We talked about a no added salt diet. Continue current medication  - Lipid Panel; Future  - TSH without Reflex; Future    2. Fibromyalgia  Condition is stable  Continue trying to stay physically active. She will continue baclofen and trazodone  Continue stretching   Patient will continue naproxen for chronic pain. Without it her pain level increases. This is a little unusual for fibromyalgia but she does not have any other joint issues    3. Dyslipidemia  Condition stability and control are uncertain. Overdue for lab  Patient will continue fenofibrate as her previous main defect was hypertriglyceridemia. - Lipid Panel; Future  - TSH without Reflex; Future    4. Impaired fasting glucose  Condition stability and control are uncertain. Due for lab  Continue efforts with lifestyle approach. - Hemoglobin A1C; Future    5. Multiple sclerosis ([address]  Under the management of neurology    6. Multiple thyroid nodules  Thyroid ultrasound        Discussed medications with patient who voiced understanding of their use, indication and potential side effects. Pt also understands the above recommendations. All questions answered.     This note was generated completely or in part utilizing Dragon dictation speech recognition software. Occasionally, words are mistranscribed and despite editing, the text may contain inaccuracies due to incorrect word recognition.   If further clarification is needed please contact the office at [phone]       Electronically signed    Santa Willard D.O.

## 2022-02-27 DIAGNOSIS — G47.00 INSOMNIA, UNSPECIFIED TYPE: ICD-10-CM

## 2022-02-28 RX ORDER — HYDROCHLOROTHIAZIDE 25 MG/1
TABLET ORAL
Qty: 90 TABLET | Refills: 0 | Status: SHIPPED | OUTPATIENT
Start: 2022-02-28 | End: 2022-04-04

## 2022-02-28 RX ORDER — NAPROXEN 500 MG/1
TABLET ORAL
Qty: 180 TABLET | Refills: 0 | Status: SHIPPED | OUTPATIENT
Start: 2022-02-28 | End: 2022-04-04

## 2022-02-28 RX ORDER — TRAZODONE HYDROCHLORIDE 150 MG/1
TABLET ORAL
Qty: 90 TABLET | Refills: 0 | Status: SHIPPED | OUTPATIENT
Start: 2022-02-28 | End: 2022-04-04

## 2022-03-21 DIAGNOSIS — Z78.0 MENOPAUSE: ICD-10-CM

## 2022-03-21 RX ORDER — CLONIDINE HYDROCHLORIDE 0.2 MG/1
TABLET ORAL
Qty: 90 TABLET | Refills: 0 | Status: SHIPPED | OUTPATIENT
Start: 2022-03-21 | End: 2022-04-04

## 2022-03-23 ENCOUNTER — HOSPITAL ENCOUNTER (OUTPATIENT)
Dept: ULTRASOUND IMAGING | Age: 57
Discharge: HOME | End: 2022-03-23
Payer: COMMERCIAL

## 2022-03-23 DIAGNOSIS — E04.2 MULTIPLE THYROID NODULES: ICD-10-CM

## 2022-03-23 PROCEDURE — 76536 US EXAM OF HEAD AND NECK: CPT

## 2022-03-24 ENCOUNTER — PATIENT MESSAGE (OUTPATIENT)
Dept: INTERNAL MEDICINE CLINIC | Age: 57
End: 2022-03-24

## 2022-03-24 DIAGNOSIS — E04.1 THYROID NODULE: Primary | ICD-10-CM

## 2022-03-24 NOTE — TELEPHONE ENCOUNTER
From: Leilani Balderas  To: Dr. Ruth Fears: 3/24/2022 12:06 PM EDT  Subject: Thyroid ultrasound    Hello Dr. Shan Newsome,    Yes, please send referral for ENT. We met Dr. Dulce Smith (in Dr. Geno Dvais office) when Keaton Wright was in hospital. Would like to see him or Dr. Geno Davis.     Thank you,  Boston Banda

## 2022-04-01 ENCOUNTER — OFFICE VISIT (OUTPATIENT)
Dept: ENT CLINIC | Age: 57
End: 2022-04-01
Payer: COMMERCIAL

## 2022-04-01 VITALS
HEIGHT: 64 IN | DIASTOLIC BLOOD PRESSURE: 79 MMHG | WEIGHT: 231 LBS | SYSTOLIC BLOOD PRESSURE: 120 MMHG | BODY MASS INDEX: 39.44 KG/M2 | HEART RATE: 84 BPM

## 2022-04-01 DIAGNOSIS — E04.2 MULTIPLE THYROID NODULES: Primary | ICD-10-CM

## 2022-04-01 PROCEDURE — 99203 OFFICE O/P NEW LOW 30 MIN: CPT | Performed by: OTOLARYNGOLOGY

## 2022-04-01 ASSESSMENT — ENCOUNTER SYMPTOMS
DIARRHEA: 0
RHINORRHEA: 0
PHOTOPHOBIA: 0
VOMITING: 0
COLOR CHANGE: 0
WHEEZING: 0
BLOOD IN STOOL: 0
CHOKING: 0
SORE THROAT: 0
VOICE CHANGE: 0
COUGH: 0
BACK PAIN: 0
SINUS PRESSURE: 0
NAUSEA: 0
FACIAL SWELLING: 0
STRIDOR: 0
EYE DISCHARGE: 0
SINUS PAIN: 0
SHORTNESS OF BREATH: 0
EYE ITCHING: 0
CONSTIPATION: 0
TROUBLE SWALLOWING: 0

## 2022-04-01 NOTE — PROGRESS NOTES
[facility]  [address] [address] [facility], [address] [place] [address], [address] [place] [address] Amrita  P: [phone]  F: [phone]       Patient     Dionne Faust  1965    ChiefComplaint     Chief Complaint   Patient presents with    New Patient     Patient is here today because she has had nodules on her thyroid for years but a recent US show that one of them is growing       History of Present Illness     Dionne Faust is a pleasant 60 y.o. female who presents as a new patient referred by her primary care physician for thyroid nodules. The patient has been followed with serial ultrasounds for her thyroid. Her right lobe has a 3 mm hypoechoic nodule. The left lobe does have an 8 x 5 x 4 mm hypoechoic nodule with some calcifications. This nodule has slightly increased in size compared to ultrasound from 2017. She denies any significant compressive symptoms. Denies a family history of thyroid cancer. Denies history of radiation exposure to the head or neck. Denies change in voice.     Past Medical History     Past Medical History:   Diagnosis Date    Adenomatous colon polyp 11/2017    Dr. Houston Carty 01/14/2014    Asthma     seasonal asthma    Atypical ductal hyperplasia, breast     Bilateral kidney stones 12/27/2018    Cellulitis, lip     mrsa    Cervical spinal stenosis     Cholelithiasis 06/2018    Clotting disorder ([address]     blood clot in leg as teenager bcp    COVID-19 virus infection 08/2021    Crohn's disease ([address]     DDD (degenerative disc disease), cervical     DDD (degenerative disc disease), lumbar     Depression 01/14/2014    DVT, lower extremity ([address] 1979    Due to birth control pills    Fibrocystic breast     Fibromyalgia 10/15/2013    GERD (gastroesophageal reflux disease)     Hormone disorder     Hx MRSA infection     pt states she has had several episodes of mrsa infections recently on stomach, which was tx'ed w/ antibiotics and is now (4/2013) scabbed over     [facility] Hyperlipidemia     Hypertension     Insomnia     interruption insomnia    Migraine     MS (multiple sclerosis) ([address] 06/2010    Multinodular goiter     Nausea & vomiting     Osteoarthritis     hips and shoulders    Polyp of colon, adenomatous 11/22/2017    PONV (postoperative nausea and vomiting)     severe    Rash     Right leg DVT ([address]     10years of age   Zaira Mota Sleep apnea     CPAP set at 15, 11    Spondylolisthesis of lumbar region     Thyroid nodule 06/13/2013    Ureteral stone 12/16/2018    with right hydronephrosis    Ureterolithiasis 12/16/2018    with right hydronephrosis    Urinary incontinence     Venous insufficiency 03/08/2013    insufficiency & reflux of legs    Weakness of left arm     DUE TO MS       Past Surgical History     Past Surgical History:   Procedure Laterality Date    BLADDER SUSPENSION  1995    BREAST BIOPSY      BREAST SURGERY  2000    rt breast lumpectomy-atypical    COLONOSCOPY  3/13/2013    Dr. Nj Check    partial     OTHER SURGICAL HISTORY Bilateral 06/14/2013    Incision and Drainage of abcess lower lip    OTHER SURGICAL HISTORY N/A 08/11/2016    OPEN REPAIR OF VENTRAL HERNIA             SALPINGO-OOPHORECTOMY  4/12/2013    SKIN BIOPSY         Family History     Family History   Problem Relation Age of Onset    High Blood Pressure Mother     Diabetes Mother     Heart Disease Father         stents    Alcohol Abuse Father     Diabetes Sister     High Blood Pressure Sister     COPD Brother     Schizophrenia Brother     Alcohol Abuse Brother     Cirrhosis Brother     Emphysema Neg Hx     Heart Failure Neg Hx     Rheum Arthritis Neg Hx     Osteoarthritis Neg Hx     Breast Cancer Neg Hx     Hypertension Neg Hx     Migraines Neg Hx     Ovarian Cancer Neg Hx     Rashes/Skin Problems Neg Hx     Seizures Neg Hx     Thyroid Disease Neg Hx        Social History     Social History Socioeconomic History    Marital status:      Spouse name: Not on file    Number of children: Not on file    Years of education: Not on file    Highest education level: Not on file   Occupational History    Not on file   Tobacco Use    Smoking status: Former Smoker     Packs/day: 0.25     Years: 5.00     Pack years: 1.25     Types: Cigarettes     Quit date: 1982     Years since quittin.7    Smokeless tobacco: Former User   Vaping Use    Vaping Use: Never used   Substance and Sexual Activity    Alcohol use: No    Drug use: No    Sexual activity: Yes     Partners: Male   Other Topics Concern    Not on file   Social History Narrative    Not on file     Social Determinants of Health     Financial Resource Strain:     Difficulty of Paying Living Expenses: Not on file   Food Insecurity:     Worried About [address] in the Last Year: Not on file    Stephen of Food in the Last Year: Not on file   Transportation Needs:     Lack of Transportation (Medical): Not on file    Lack of Transportation (Non-Medical):  Not on file   Physical Activity:     Days of Exercise per Week: Not on file    Minutes of Exercise per Session: Not on file   Stress:     Feeling of Stress : Not on file   Social Connections:     Frequency of Communication with Friends and Family: Not on file    Frequency of Social Gatherings with Friends and Family: Not on file    Attends Jainism Services: Not on file    Active Member of [address] [place] [address] or Organizations: Not on file    Attends Club or Organization Meetings: Not on file    Marital Status: Not on file   Intimate Partner Violence:     Fear of Current or Ex-Partner: Not on file    Emotionally Abused: Not on file    Physically Abused: Not on file    Sexually Abused: Not on file   Housing Stability:     Unable to Pay for Housing in the Last Year: Not on file    Number of [place] in the Last Year: Not on file    Unstable Housing in the Last Year: Not on file       Allergies     Allergies   Allergen Reactions    Bactrim [Sulfamethoxazole-Trimethoprim] Other (See Comments)     Put kidney into failure patient almost         Medications     Current Outpatient Medications   Medication Sig Dispense Refill    cloNIDine (CATAPRES) 0.2 MG tablet TAKE 1 TABLET EVERY DAY 90 tablet 0    hydroCHLOROthiazide (HYDRODIURIL) 25 MG tablet TAKE 1 TABLET EVERY DAY 90 tablet 0    traZODone (DESYREL) 150 MG tablet TAKE 1 TABLET EVERY NIGHT 90 tablet 0    naproxen (NAPROSYN) 500 MG tablet TAKE 1 TABLET TWICE DAILY WITH FOOD 180 tablet 0    budesonide-formoterol (SYMBICORT) 160-4.5 MCG/ACT AERO Inhale 2 puffs into the lungs 2 times daily 3 each 0    fenofibrate (TRIGLIDE) 160 MG tablet TAKE 1 TABLET EVERY DAY 90 tablet 1    omeprazole (PRILOSEC) 40 MG delayed release capsule TAKE 1 CAPSULE TWICE DAILY 180 capsule 1    Dextromethorphan-guaiFENesin (MUCINEX DM)  MG TB12 Take 1 tablet by mouth 2 times daily 28 tablet 0    sodium chloride (OCEAN) 0.65 % nasal spray 3 sprays by Nasal route 3 times daily      fluticasone (FLONASE) 50 MCG/ACT nasal spray 2 sprays by Each Nostril route daily 16 g 0    albuterol sulfate  (90 Base) MCG/ACT inhaler INHALE 2 PUFFS INTO THE LUNGS EVERY 6 HOURS AS NEEDED FOR WHEEZING 54 g 2    propranolol (INDERAL LA) 80 MG extended release capsule TAKE 1 CAPSULE EVERY DAY 90 capsule 3    albuterol sulfate HFA (VENTOLIN HFA) 108 (90 Base) MCG/ACT inhaler Inhale 2 puffs into the lungs every 6 hours as needed for Wheezing 3 Inhaler 3    baclofen (LIORESAL) 10 MG tablet Take 1 tablet by mouth 3 times daily 30 tablet 0    Cholecalciferol (VITAMIN D3) 50 MCG (2000 UT) CAPS Take 1 capsule by mouth daily      ferrous sulfate (IRON 325) 325 (65 Fe) MG tablet Take 325 mg by mouth daily (with breakfast)      albuterol sulfate  (90 Base) MCG/ACT inhaler INHALE 2 PUFFS INTO THE LUNGS EVERY 6 HOURS AS NEEDED FOR WHEEZING, SHORTNESS OF BREATH OR COUGHING 54 g 0    modafinil (PROVIGIL) 200 MG tablet Take 200 mg by mouth daily.  DULoxetine (CYMBALTA) 60 MG capsule Take 60 mg by mouth 2 times daily      Peginterferon Beta-1a (PLEGRIDY) 125 MCG/0.5ML SOPN Inject 125 mLs into the skin daily 1 shot every 2 weeks      gabapentin (NEURONTIN) 100 MG capsule TAKE 1 CAPSULE TID DAILY 270 capsule 0     No current facility-administered medications for this visit. Review of Systems     Review of Systems   Constitutional: Negative for activity change, appetite change, chills, diaphoresis, fatigue, fever and unexpected weight change. HENT: Negative for congestion, dental problem, drooling, ear discharge, ear pain, facial swelling, hearing loss, mouth sores, nosebleeds, postnasal drip, rhinorrhea, sinus pressure, sinus pain, sneezing, sore throat, tinnitus, trouble swallowing and voice change. Eyes: Negative for photophobia, discharge, itching and visual disturbance. Respiratory: Negative for cough, choking, shortness of breath, wheezing and stridor. Gastrointestinal: Negative for blood in stool, constipation, diarrhea, nausea and vomiting. Endocrine: Negative for cold intolerance, heat intolerance, polyphagia and polyuria. Musculoskeletal: Negative for back pain, gait problem, neck pain and neck stiffness. Skin: Negative for color change, pallor, rash and wound. Neurological: Negative for dizziness, syncope, facial asymmetry, speech difficulty, light-headedness, numbness and headaches. Hematological: Negative for adenopathy. Does not bruise/bleed easily. Psychiatric/Behavioral: Negative for agitation, confusion and sleep disturbance. PhysicalExam     Vitals:    04/01/22 0947   BP: 120/79   Pulse: 84       Physical Exam  Constitutional:       Appearance: She is well-developed. HENT:      Head: Normocephalic and atraumatic. Not macrocephalic and not microcephalic.  No raccoon eyes, Robertson's sign, abrasion, contusion, right periorbital erythema, left periorbital erythema or laceration. Hair is normal.      Jaw: No trismus. Right Ear: Hearing, tympanic membrane and external ear normal. No decreased hearing noted. No drainage, swelling or tenderness. No middle ear effusion. No mastoid tenderness. Tympanic membrane is not perforated, retracted or bulging. Tympanic membrane has normal mobility. Left Ear: Hearing, tympanic membrane and external ear normal. No decreased hearing noted. No drainage, swelling or tenderness. No middle ear effusion. No mastoid tenderness. Tympanic membrane is not perforated, retracted or bulging. Tympanic membrane has normal mobility. Nose: No nasal deformity, septal deviation, laceration, mucosal edema or rhinorrhea. Right Sinus: No maxillary sinus tenderness or frontal sinus tenderness. Left Sinus: No maxillary sinus tenderness or frontal sinus tenderness. Mouth/Throat:      Mouth: Mucous membranes are not pale, not dry and not cyanotic. No lacerations or oral lesions. Dentition: Normal dentition. No dental caries or dental abscesses. Pharynx: Uvula midline. No oropharyngeal exudate, posterior oropharyngeal erythema or uvula swelling. Tonsils: No tonsillar abscesses. Eyes:      General: Lids are normal.         Right eye: No discharge. Left eye: No discharge. Extraocular Movements:      Right eye: Normal extraocular motion and no nystagmus. Left eye: Normal extraocular motion and no nystagmus. Conjunctiva/sclera:      Right eye: No chemosis or exudate. Left eye: No chemosis or exudate. Neck:      Thyroid: No thyroid mass or thyromegaly. Vascular: Normal carotid pulses. Trachea: No tracheal tenderness or tracheal deviation. Cardiovascular:      Rate and Rhythm: Normal rate and regular rhythm. Pulmonary:      Effort: No tachypnea, bradypnea or respiratory distress. Breath sounds: No stridor.    Musculoskeletal: Right shoulder: Normal range of motion. Cervical back: Neck supple. Lymphadenopathy:      Head:      Right side of head: No submental, submandibular, tonsillar, preauricular, posterior auricular or occipital adenopathy. Left side of head: No submental, submandibular, tonsillar, preauricular, posterior auricular or occipital adenopathy. Cervical: No cervical adenopathy. Right cervical: No superficial, deep or posterior cervical adenopathy. Left cervical: No superficial, deep or posterior cervical adenopathy. Skin:     General: Skin is warm and dry. Findings: No bruising, erythema, laceration, lesion or rash. Neurological:      Mental Status: She is alert and oriented to person, place, and time. Cranial Nerves: No cranial nerve deficit. Psychiatric:         Speech: Speech normal.         Behavior: Behavior normal.           Procedure           Assessment and Plan     1. Multiple thyroid nodules  Patient here today with multinodular goiter, largest nodule measuring 8 mm with calcifications in the left lobe. This is slightly increased compared to ultrasound 2017. At this juncture, nodules likely too small for biopsy. Recommend repeat ultrasound in 1 year to assess for any further growth. Patient is in agreement with this plan. Contact with any questions or concerns. - US THYROID; Future      Return in about 1 year (around 4/1/2023) for after ultrasound. Portions of this note were dictated using Dragon.  There may be linguistic errors secondary to the use of this program.

## 2022-04-04 DIAGNOSIS — Z78.0 MENOPAUSE: ICD-10-CM

## 2022-04-04 DIAGNOSIS — G47.00 INSOMNIA, UNSPECIFIED TYPE: ICD-10-CM

## 2022-04-04 RX ORDER — FENOFIBRATE 160 MG/1
TABLET ORAL
Qty: 90 TABLET | Refills: 1 | Status: SHIPPED | OUTPATIENT
Start: 2022-04-04

## 2022-04-04 RX ORDER — GABAPENTIN 100 MG/1
CAPSULE ORAL
Qty: 270 CAPSULE | Refills: 1 | Status: SHIPPED | OUTPATIENT
Start: 2022-04-04 | End: 2022-10-04

## 2022-04-04 RX ORDER — PROPRANOLOL HYDROCHLORIDE 80 MG/1
CAPSULE, EXTENDED RELEASE ORAL
Qty: 90 CAPSULE | Refills: 3 | Status: SHIPPED | OUTPATIENT
Start: 2022-04-04

## 2022-04-04 RX ORDER — CLONIDINE HYDROCHLORIDE 0.2 MG/1
TABLET ORAL
Qty: 90 TABLET | Refills: 0 | Status: SHIPPED | OUTPATIENT
Start: 2022-04-04 | End: 2022-07-12

## 2022-04-04 RX ORDER — BUDESONIDE AND FORMOTEROL FUMARATE DIHYDRATE 160; 4.5 UG/1; UG/1
AEROSOL RESPIRATORY (INHALATION)
Qty: 3 EACH | Refills: 0 | Status: SHIPPED | OUTPATIENT
Start: 2022-04-04

## 2022-04-04 RX ORDER — OMEPRAZOLE 40 MG/1
CAPSULE, DELAYED RELEASE ORAL
Qty: 180 CAPSULE | Refills: 1 | Status: SHIPPED | OUTPATIENT
Start: 2022-04-04

## 2022-04-04 RX ORDER — NAPROXEN 500 MG/1
TABLET ORAL
Qty: 180 TABLET | Refills: 0 | Status: SHIPPED | OUTPATIENT
Start: 2022-04-04 | End: 2022-07-12

## 2022-04-04 RX ORDER — TRAZODONE HYDROCHLORIDE 150 MG/1
TABLET ORAL
Qty: 90 TABLET | Refills: 0 | Status: SHIPPED | OUTPATIENT
Start: 2022-04-04 | End: 2022-07-20

## 2022-04-04 RX ORDER — HYDROCHLOROTHIAZIDE 25 MG/1
TABLET ORAL
Qty: 90 TABLET | Refills: 0 | Status: SHIPPED | OUTPATIENT
Start: 2022-04-04 | End: 2022-07-12

## 2022-04-18 NOTE — TELEPHONE ENCOUNTER
Last appointment: 7/30/2020  Next appointment: Visit date not found  Last refill: 7/13/20
Urine Pregnancy Test Result: negative
Detail Level: None
Performed By: medical assistant

## 2022-06-24 NOTE — TELEPHONE ENCOUNTER
Last appointment: 11/9/2020 (acute)  Next appointment: Visit date not found. When is patient due?   Last refill: refill dates vary
none

## 2022-07-11 DIAGNOSIS — Z78.0 MENOPAUSE: ICD-10-CM

## 2022-07-12 RX ORDER — CLONIDINE HYDROCHLORIDE 0.2 MG/1
TABLET ORAL
Qty: 90 TABLET | Refills: 0 | Status: SHIPPED | OUTPATIENT
Start: 2022-07-12

## 2022-07-12 RX ORDER — HYDROCHLOROTHIAZIDE 25 MG/1
TABLET ORAL
Qty: 90 TABLET | Refills: 0 | Status: SHIPPED | OUTPATIENT
Start: 2022-07-12

## 2022-07-12 RX ORDER — NAPROXEN 500 MG/1
TABLET ORAL
Qty: 180 TABLET | Refills: 0 | Status: SHIPPED | OUTPATIENT
Start: 2022-07-12

## 2022-07-20 DIAGNOSIS — G47.00 INSOMNIA, UNSPECIFIED TYPE: ICD-10-CM

## 2022-07-20 RX ORDER — TRAZODONE HYDROCHLORIDE 150 MG/1
TABLET ORAL
Qty: 90 TABLET | Refills: 0 | Status: SHIPPED | OUTPATIENT
Start: 2022-07-20

## 2022-07-20 NOTE — TELEPHONE ENCOUNTER
Last appointment: 1/14/2022  Next appointment: Visit date not found  Last refill: 4/4/2022  Sent [employer] message to schedule due/overdue appointment.

## 2022-10-01 NOTE — TELEPHONE ENCOUNTER
Last appointment: 1/14/2022  Next appointment: Visit date not found  Last refill: 4/4/2022  Left message for patient to call and schedule due/overdue appointment.

## 2022-10-02 RX ORDER — FENOFIBRATE 160 MG/1
TABLET ORAL
Qty: 90 TABLET | Refills: 0 | OUTPATIENT
Start: 2022-10-02

## 2022-10-02 RX ORDER — OMEPRAZOLE 40 MG/1
CAPSULE, DELAYED RELEASE ORAL
Qty: 90 CAPSULE | Refills: 0 | OUTPATIENT
Start: 2022-10-02

## 2022-12-08 DIAGNOSIS — G47.00 INSOMNIA, UNSPECIFIED TYPE: ICD-10-CM

## 2022-12-08 DIAGNOSIS — Z78.0 MENOPAUSE: ICD-10-CM

## 2022-12-08 RX ORDER — TRAZODONE HYDROCHLORIDE 150 MG/1
TABLET ORAL
Qty: 90 TABLET | Refills: 1 | Status: SHIPPED | OUTPATIENT
Start: 2022-12-08

## 2022-12-08 RX ORDER — OMEPRAZOLE 40 MG/1
CAPSULE, DELAYED RELEASE ORAL
Qty: 180 CAPSULE | Refills: 1 | Status: SHIPPED | OUTPATIENT
Start: 2022-12-08

## 2022-12-08 RX ORDER — GABAPENTIN 100 MG/1
CAPSULE ORAL
Qty: 270 CAPSULE | Refills: 1 | Status: SHIPPED | OUTPATIENT
Start: 2022-12-08 | End: 2023-05-08

## 2022-12-08 RX ORDER — CLONIDINE HYDROCHLORIDE 0.2 MG/1
TABLET ORAL
Qty: 90 TABLET | Refills: 0 | Status: SHIPPED | OUTPATIENT
Start: 2022-12-08

## 2022-12-08 RX ORDER — HYDROCHLOROTHIAZIDE 25 MG/1
TABLET ORAL
Qty: 90 TABLET | Refills: 1 | Status: SHIPPED | OUTPATIENT
Start: 2022-12-08

## 2022-12-08 RX ORDER — FENOFIBRATE 160 MG/1
TABLET ORAL
Qty: 90 TABLET | Refills: 1 | Status: SHIPPED | OUTPATIENT
Start: 2022-12-08

## 2022-12-08 RX ORDER — NAPROXEN 500 MG/1
TABLET ORAL
Qty: 180 TABLET | Refills: 1 | Status: SHIPPED | OUTPATIENT
Start: 2022-12-08

## 2022-12-08 RX ORDER — ALBUTEROL SULFATE 90 UG/1
AEROSOL, METERED RESPIRATORY (INHALATION)
Qty: 3 EACH | Refills: 3 | Status: SHIPPED | OUTPATIENT
Start: 2022-12-08

## 2022-12-08 NOTE — TELEPHONE ENCOUNTER
Last appointment: 1/14/2022  Next appointment: 12/20/2022  Last refill: 9/13/2021 4/4/2022 7/12/2022

## 2022-12-20 ENCOUNTER — OFFICE VISIT (OUTPATIENT)
Dept: INTERNAL MEDICINE CLINIC | Age: 57
End: 2022-12-20
Payer: COMMERCIAL

## 2022-12-20 VITALS
DIASTOLIC BLOOD PRESSURE: 86 MMHG | HEART RATE: 81 BPM | HEIGHT: 64 IN | SYSTOLIC BLOOD PRESSURE: 128 MMHG | OXYGEN SATURATION: 98 % | RESPIRATION RATE: 14 BRPM | WEIGHT: 230 LBS | BODY MASS INDEX: 39.27 KG/M2

## 2022-12-20 DIAGNOSIS — K21.9 GASTROESOPHAGEAL REFLUX DISEASE WITHOUT ESOPHAGITIS: ICD-10-CM

## 2022-12-20 DIAGNOSIS — I10 BENIGN ESSENTIAL HTN: Primary | ICD-10-CM

## 2022-12-20 DIAGNOSIS — M79.7 FIBROMYALGIA: ICD-10-CM

## 2022-12-20 DIAGNOSIS — E78.5 DYSLIPIDEMIA: ICD-10-CM

## 2022-12-20 DIAGNOSIS — G35 MULTIPLE SCLEROSIS (HCC): ICD-10-CM

## 2022-12-20 DIAGNOSIS — R73.01 IMPAIRED FASTING GLUCOSE: ICD-10-CM

## 2022-12-20 DIAGNOSIS — G47.33 OSA (OBSTRUCTIVE SLEEP APNEA): ICD-10-CM

## 2022-12-20 PROCEDURE — 99214 OFFICE O/P EST MOD 30 MIN: CPT | Performed by: INTERNAL MEDICINE

## 2022-12-20 PROCEDURE — 3074F SYST BP LT 130 MM HG: CPT | Performed by: INTERNAL MEDICINE

## 2022-12-20 PROCEDURE — 3078F DIAST BP <80 MM HG: CPT | Performed by: INTERNAL MEDICINE

## 2022-12-20 SDOH — ECONOMIC STABILITY: FOOD INSECURITY: WITHIN THE PAST 12 MONTHS, THE FOOD YOU BOUGHT JUST DIDN'T LAST AND YOU DIDN'T HAVE MONEY TO GET MORE.: NEVER TRUE

## 2022-12-20 SDOH — ECONOMIC STABILITY: FOOD INSECURITY: WITHIN THE PAST 12 MONTHS, YOU WORRIED THAT YOUR FOOD WOULD RUN OUT BEFORE YOU GOT MONEY TO BUY MORE.: NEVER TRUE

## 2022-12-20 ASSESSMENT — SOCIAL DETERMINANTS OF HEALTH (SDOH): HOW HARD IS IT FOR YOU TO PAY FOR THE VERY BASICS LIKE FOOD, HOUSING, MEDICAL CARE, AND HEATING?: NOT HARD AT ALL

## 2022-12-20 NOTE — PROGRESS NOTES
[facility]) Physicians  Internal Medicine  Patient Encounter  Lisa Fish D.O., [facility]        Chief Complaint   Patient presents with    Check-Up    Medication Check       HPI: 60 y.o. female seen today requesting her routine checkup regarding the status of current chronic medical problems listed below along with a medication review and reconciliation. She states she has been doing well. She feels good. Due for Colonoscopy      HTN-- She denies any lightheadedness, headaches, dizziness, syncope. She denies any TIA or stroke related symptoms. IFG--patient endorses no symptoms suggestive of glucose toxicity. She denies any blurry vision or weight changes. She does not exercise. Diet is good. Lab Results   Component Value Date    LABA1C 5.4 03/30/2022     Lab Results   Component Value Date    .3 03/30/2022       Asthma--Patient remains on Symbicort. She denies any cough or wheezing. Depression/Anxiety/Adjustment disorder/Fibromyalgia-- The Cymbalta has worked well. She states he mood is good. Her pain level is good. She does have some low back issues-- [facility]. She has Herniated discs. She had DEANNA's, Dr. Espinal. She has low back pain radiating to both legs, L>R. She does not walk much. Hyperlipidemia--  Patient has a history of mixed dyslipidemia with primarily a triglyceride defect. She is on fenofibrate and tolerates the medication well. GERD--Patient remains on omeprazole 40 mg daily. Unfortunately she remains on NSAID treatment daily. She denies any heartburn or dysphagia. She denies any GI bleeding. MS-- She sees Dr. Ruddy Pastrana at [address] [place] [address] [address]. She remains on Peginterferon-Beta. Recent MRI stable. DEVIN--Patient remains compliant with BiPAP therapy. She also requires Provigil.         Past Medical History:   Diagnosis Date    Adenomatous colon polyp 11/2017    Dr. Vanessa Rooney 01/14/2014    Asthma     seasonal asthma    Atypical ductal hyperplasia, breast     Bilateral kidney stones 12/27/2018    Cellulitis, lip     mrsa    Cervical spinal stenosis     Cholelithiasis 06/2018    Clotting disorder (HCC)     blood clot in leg as teenager bcp    COVID-19 virus infection 08/2021    Crohn's disease ([address]     DDD (degenerative disc disease), cervical     DDD (degenerative disc disease), lumbar     Depression 01/14/2014    DVT, lower extremity ([address] 1979    Due to birth control pills    Fibrocystic breast     Fibromyalgia 10/15/2013    GERD (gastroesophageal reflux disease)     Hormone disorder     Hx MRSA infection     pt states she has had several episodes of mrsa infections recently on stomach, which was tx'ed w/ antibiotics and is now (4/2013) scabbed over      Hyperlipidemia     Hypertension     Insomnia     interruption insomnia    Migraine     MS (multiple sclerosis) ([address] 06/2010    Multinodular goiter     Nausea & vomiting     Osteoarthritis     hips and shoulders    Polyp of colon, adenomatous 11/22/2017    PONV (postoperative nausea and vomiting)     severe    Rash     Right leg DVT ([address]     10years of age    Sleep apnea     CPAP set at 15, 11    Spondylolisthesis of lumbar region     Thyroid nodule 06/13/2013    Ureteral stone 12/16/2018    with right hydronephrosis    Ureterolithiasis 12/16/2018    with right hydronephrosis    Urinary incontinence     Venous insufficiency 03/08/2013    insufficiency & reflux of legs    Weakness of left arm     DUE TO MS         MEDICATIONS:  Medication Sig   cloNIDine (CATAPRES) 0.2 MG tablet TAKE 1 TABLET EVERY DAY   budesonide-formoterol (SYMBICORT) 160-4.5 MCG/ACT AERO Inhale 2 puffs into the lungs 2 times daily   hydroCHLOROthiazide (HYDRODIURIL) 25 MG tablet Take 1 tablet by mouth daily   fenofibrate (TRIGLIDE) 160 MG tablet TAKE 1 TABLET EVERY DAY   traZODone (DESYREL) 150 MG tablet TAKE 1 TABLET EVERY NIGHT   naproxen (NAPROSYN) 500 MG tablet TAKE 1 TABLET TWICE DAILY WITH FOOD   gabapentin (NEURONTIN) 100 MG capsule TAKE 1 CAPSULE TID DAILY   omeprazole (PRILOSEC) 40 MG delayed release capsule TAKE 1 CAPSULE TWICE DAILY   Dextromethorphan-guaiFENesin (MUCINEX DM)  MG TB12 Take 1 tablet by mouth 2 times daily   sodium chloride (OCEAN) 0.65 % nasal spray 3 sprays by Nasal route 3 times daily   fluticasone (FLONASE) 50 MCG/ACT nasal spray 2 sprays by Each Nostril route daily   albuterol sulfate  (90 Base) MCG/ACT inhaler INHALE 2 PUFFS INTO THE LUNGS EVERY 6 HOURS AS NEEDED FOR WHEEZING   propranolol (INDERAL LA) 80 MG extended release capsule TAKE 1 CAPSULE EVERY DAY   albuterol sulfate HFA (VENTOLIN HFA) 108 (90 Base) MCG/ACT inhaler Inhale 2 puffs into the lungs every 6 hours as needed for Wheezing   baclofen (LIORESAL) 10 MG tablet Take 1 tablet by mouth 3 times daily   Cholecalciferol (VITAMIN D3) 50 MCG (2000 UT) CAPS Take 1 capsule by mouth daily   ferrous sulfate (IRON 325) 325 (65 Fe) MG tablet Take 325 mg by mouth daily (with breakfast)   albuterol sulfate  (90 Base) MCG/ACT inhaler INHALE 2 PUFFS INTO THE LUNGS EVERY 6 HOURS AS NEEDED FOR WHEEZING, SHORTNESS OF BREATH OR COUGHING   modafinil (PROVIGIL) 200 MG tablet Take 200 mg by mouth daily. DULoxetine (CYMBALTA) 60 MG capsule Take 60 mg by mouth 2 times daily   Peginterferon Beta-1a (PLEGRIDY) 125 MCG/0.5ML SOPN Inject 125 mLs into the skin daily 1 shot every 2 weeks           Review of Systems - As per HPI  GEN: Pt denies fever, chills or night sweats. Denies weight changes. Appetite good  HEENT: Pt denies visual and auditory changes, epistaxis, upper respiratory symptoms and dysphagia  CV: Pt denies CP, SOB, GILLIS, orthopnea palpitations, LE swelling, and claudication. PULM: Pt denies cough, wheezing or sputum production  GI: Pt denies N/V/D, heart burn, rectal bleeding, or melena. NEURO: Pt denies unilateral weakness, paresthesia and dizziness.     OBJECTIVE:  Vitals:    12/20/22 1636   BP: 128/86   Pulse: 81 Resp: 14   SpO2: 98%   Weight: 230 lb (104.3 kg)   Height: 5' 4\" (1.626 m)     Body mass index is 39.48 kg/m². Wt Readings from Last 3 Encounters:   12/20/22 230 lb (104.3 kg)   04/01/22 231 lb (104.8 kg)   01/14/22 231 lb (104.8 kg)     BP Readings from Last 3 Encounters:   12/20/22 128/86   04/01/22 120/79   01/14/22 136/88        GEN: NAD, A&O, Non-toxic, obese  HEENT: NC/AT, ADELINA, EOMI, Oral cavity Clear,  TM's NL, anicteric. NECK: Supple. No thyromegaly. No JVD  LYMPH: No C/SC nodes. CV: Regular rhythm. Rate normal.  No murmurs. No ectopy. PULM: CTA  EXT: No edema  GI: Abdomen is soft, NT, BS+, No hepatomegaly. No masses. NEURO: No focal or lateralizing deficits. Gait is normal.  No ataxia. Moves all extremities symmetrically. VASC:  No carotid bruits. Pedal pulses symmetric  SKIN:  No rashes or lesions of concern      ASSESSMENT/PLAN:    1. Benign essential HTN  Blood pressure is fairly well controlled  Continue a no added salt diet. Patient counseled  Check electrolytes and renal function  Patient will continue clonidine 0.2 mg daily, Inderal LA 80 mg daily  - CBC with Auto Differential; Future  - Comprehensive Metabolic Panel; Future  - Lipid Panel; Future  - TSH; Future    2. Dyslipidemia  Condition control is uncertain  Overdue for lab  Continue fenofibrate for now  Consider statin therapy  - Comprehensive Metabolic Panel; Future  - Lipid Panel; Future  - TSH; Future    3. Impaired fasting glucose  Condition is asymptomatic  Check A1c to ensure she has not transition to type 2 diabetes  Continue efforts with Lifestyle modification including low calorie diet focusing on Low fat/low cholesterol and low carbohydrate intake, along with  increasing cardiovascular (aerobic) exercise. - Comprehensive Metabolic Panel; Future  - Hemoglobin A1C; Future    4. Multiple sclerosis ([facility]  Under the care of Dr. Jamaal Banerjee [address]  Management per neurology  Check vitamin D  - Vitamin D 25 Hydroxy; Future    5. Fibromyalgia  Condition is much improved and has been doing very well since she started Cymbalta  Continue current medication    6. Gastroesophageal reflux disease without esophagitis  Well-controlled  Check magnesium since she is on daily PPI therapy  Check vitamin D  Monitor for red flag symptoms such as dysphagia  - Magnesium; Future    7. DEVIN (obstructive sleep apnea)  Well-controlled on BiPAP            Discussed medications with patient who voiced understanding of their use, indication and potential side effects. Pt also understands the above recommendations. All questions answered. This note was generated completely or in part utilizing Dragon dictation speech recognition software. Occasionally, words are mistranscribed and despite editing, the text may contain inaccuracies due to incorrect word recognition.   If further clarification is needed please contact the office at [phone]       Electronically signed    Dwain Kearns D.O.

## 2023-04-24 ENCOUNTER — E-VISIT (OUTPATIENT)
Dept: INTERNAL MEDICINE CLINIC | Age: 58
End: 2023-04-24
Payer: COMMERCIAL

## 2023-04-24 DIAGNOSIS — B02.9 HERPES ZOSTER WITHOUT COMPLICATION: Primary | ICD-10-CM

## 2023-04-24 PROCEDURE — 99441 PR PHYS/QHP TELEPHONE EVALUATION 5-10 MIN: CPT | Performed by: INTERNAL MEDICINE

## 2023-04-24 RX ORDER — VALACYCLOVIR HYDROCHLORIDE 1 G/1
1000 TABLET, FILM COATED ORAL 3 TIMES DAILY
Qty: 21 TABLET | Refills: 0 | Status: SHIPPED | OUTPATIENT
Start: 2023-04-24 | End: 2023-05-01

## 2023-04-24 NOTE — PROGRESS NOTES
HPI: as per patient provided history  Exam: N/A (electronic visit)  ASSESSMENT/PLAN:  Diagnoses and all orders for this visit:    Herpes zoster without complication  -     valACYclovir (VALTREX) 1 g tablet; Take 1 tablet by mouth 3 times daily for 7 days        Patient instructed to call the office if worsens, or fails to improve as anticipated. 5-10 (10) minutes were spent on the digital evaluation and management of this patient.     [address] [place] [address]

## 2023-04-28 RX ORDER — PROPRANOLOL HYDROCHLORIDE 80 MG/1
CAPSULE, EXTENDED RELEASE ORAL
Qty: 90 CAPSULE | Refills: 3 | Status: SHIPPED | OUTPATIENT
Start: 2023-04-28

## 2023-05-20 DIAGNOSIS — Z78.0 MENOPAUSE: ICD-10-CM

## 2023-05-22 RX ORDER — CLONIDINE HYDROCHLORIDE 0.2 MG/1
TABLET ORAL
Qty: 90 TABLET | Refills: 0 | Status: SHIPPED | OUTPATIENT
Start: 2023-05-22

## 2023-06-27 DIAGNOSIS — G47.00 INSOMNIA, UNSPECIFIED TYPE: ICD-10-CM

## 2023-06-27 RX ORDER — HYDROCHLOROTHIAZIDE 25 MG/1
TABLET ORAL
Qty: 90 TABLET | Refills: 1 | Status: SHIPPED | OUTPATIENT
Start: 2023-06-27

## 2023-06-27 RX ORDER — NAPROXEN 500 MG/1
TABLET ORAL
Qty: 180 TABLET | Refills: 1 | Status: SHIPPED | OUTPATIENT
Start: 2023-06-27

## 2023-06-27 RX ORDER — TRAZODONE HYDROCHLORIDE 150 MG/1
TABLET ORAL
Qty: 90 TABLET | Refills: 1 | Status: SHIPPED | OUTPATIENT
Start: 2023-06-27

## 2023-06-27 RX ORDER — OMEPRAZOLE 40 MG/1
CAPSULE, DELAYED RELEASE ORAL
Qty: 180 CAPSULE | Refills: 1 | Status: SHIPPED | OUTPATIENT
Start: 2023-06-27

## 2023-06-27 RX ORDER — FENOFIBRATE 160 MG/1
TABLET ORAL
Qty: 90 TABLET | Refills: 1 | Status: SHIPPED | OUTPATIENT
Start: 2023-06-27

## 2023-07-20 ENCOUNTER — PATIENT MESSAGE (OUTPATIENT)
Dept: INTERNAL MEDICINE CLINIC | Age: 58
End: 2023-07-20

## 2023-07-20 DIAGNOSIS — G47.00 INSOMNIA, UNSPECIFIED TYPE: ICD-10-CM

## 2023-07-20 RX ORDER — OMEPRAZOLE 40 MG/1
40 CAPSULE, DELAYED RELEASE ORAL 2 TIMES DAILY
Qty: 180 CAPSULE | Refills: 1 | Status: SHIPPED | OUTPATIENT
Start: 2023-07-20

## 2023-07-20 RX ORDER — ALBUTEROL SULFATE 90 UG/1
2 AEROSOL, METERED RESPIRATORY (INHALATION) EVERY 6 HOURS PRN
Qty: 3 EACH | Refills: 3 | Status: SHIPPED | OUTPATIENT
Start: 2023-07-20

## 2023-07-20 RX ORDER — PROPRANOLOL HYDROCHLORIDE 80 MG/1
80 CAPSULE, EXTENDED RELEASE ORAL DAILY
Qty: 90 CAPSULE | Refills: 3 | Status: SHIPPED | OUTPATIENT
Start: 2023-07-20

## 2023-07-20 RX ORDER — FENOFIBRATE 160 MG/1
160 TABLET ORAL DAILY
Qty: 90 TABLET | Refills: 1 | Status: SHIPPED | OUTPATIENT
Start: 2023-07-20

## 2023-07-20 RX ORDER — HYDROCHLOROTHIAZIDE 25 MG/1
25 TABLET ORAL DAILY
Qty: 90 TABLET | Refills: 1 | Status: SHIPPED | OUTPATIENT
Start: 2023-07-20

## 2023-07-20 RX ORDER — TRAZODONE HYDROCHLORIDE 150 MG/1
150 TABLET ORAL NIGHTLY
Qty: 90 TABLET | Refills: 1 | Status: SHIPPED | OUTPATIENT
Start: 2023-07-20

## 2023-07-20 RX ORDER — NAPROXEN 500 MG/1
TABLET ORAL
Qty: 180 TABLET | Refills: 1 | Status: SHIPPED | OUTPATIENT
Start: 2023-07-20

## 2023-07-20 RX ORDER — GABAPENTIN 100 MG/1
CAPSULE ORAL
Qty: 270 CAPSULE | Refills: 1 | Status: SHIPPED | OUTPATIENT
Start: 2023-07-20 | End: 2023-12-18

## 2023-07-20 RX ORDER — BUDESONIDE AND FORMOTEROL FUMARATE DIHYDRATE 160; 4.5 UG/1; UG/1
2 AEROSOL RESPIRATORY (INHALATION) 2 TIMES DAILY
Qty: 3 EACH | Refills: 3 | Status: SHIPPED | OUTPATIENT
Start: 2023-07-20

## 2023-07-20 NOTE — TELEPHONE ENCOUNTER
From: Comfort Sorto  To: Dr. Villagran Favor: 7/20/2023 3:54 PM EDT  Subject: New insurance/pharmacy. Need new scripts. As of July 1st, Scott and I have new insurance. We now use [facility] for all mail order meds. [facility] has sent over request for these. I have an apt. scheduled for Monday October 2nd at 1. This was first available.

## 2023-08-02 ENCOUNTER — TELEPHONE (OUTPATIENT)
Dept: ADMINISTRATIVE | Age: 58
End: 2023-08-02

## 2023-08-02 NOTE — TELEPHONE ENCOUNTER
Submitted PA for Omeprazole  Via [facility]  Key: [id number]  STATUS: PENDING. Follow up done daily; if no response in three days we will refax for status check. If another three days goes by with no response we will call the insurance for status.

## 2023-08-04 RX ORDER — DULOXETIN HYDROCHLORIDE 60 MG/1
60 CAPSULE, DELAYED RELEASE ORAL 2 TIMES DAILY
Qty: 180 CAPSULE | Refills: 1 | Status: SHIPPED | OUTPATIENT
Start: 2023-08-04

## 2023-08-04 NOTE — TELEPHONE ENCOUNTER
RESubmitted PA for Omeprazole   Via [facility] Key: [id number] STATUS: PENDING. Follow up done daily; if no response in three days we will refax for status check. If another three days goes by with no response we will call the insurance for status.

## 2023-08-08 NOTE — TELEPHONE ENCOUNTER
APPROVED THROUGH 08/04/2024. If this requires a response please respond to the pool. [facility]). Please advise patient thank you.

## 2023-10-02 ENCOUNTER — OFFICE VISIT (OUTPATIENT)
Dept: INTERNAL MEDICINE CLINIC | Age: 58
End: 2023-10-02
Payer: COMMERCIAL

## 2023-10-02 VITALS
SYSTOLIC BLOOD PRESSURE: 118 MMHG | WEIGHT: 231 LBS | DIASTOLIC BLOOD PRESSURE: 70 MMHG | HEART RATE: 72 BPM | OXYGEN SATURATION: 97 % | BODY MASS INDEX: 39.44 KG/M2 | RESPIRATION RATE: 14 BRPM | HEIGHT: 64 IN

## 2023-10-02 DIAGNOSIS — I10 BENIGN ESSENTIAL HTN: ICD-10-CM

## 2023-10-02 DIAGNOSIS — E78.5 DYSLIPIDEMIA: ICD-10-CM

## 2023-10-02 DIAGNOSIS — G47.33 OSA (OBSTRUCTIVE SLEEP APNEA): ICD-10-CM

## 2023-10-02 DIAGNOSIS — Z79.899 DRUG THERAPY: ICD-10-CM

## 2023-10-02 DIAGNOSIS — Z00.00 ANNUAL PHYSICAL EXAM: ICD-10-CM

## 2023-10-02 DIAGNOSIS — Z23 NEED FOR PNEUMOCOCCAL VACCINATION: ICD-10-CM

## 2023-10-02 DIAGNOSIS — E04.2 MULTIPLE THYROID NODULES: ICD-10-CM

## 2023-10-02 DIAGNOSIS — Z12.11 SCREEN FOR COLON CANCER: ICD-10-CM

## 2023-10-02 DIAGNOSIS — Z29.11 NEED FOR RSV IMMUNIZATION: ICD-10-CM

## 2023-10-02 DIAGNOSIS — Z13.1 SCREENING FOR DIABETES MELLITUS: ICD-10-CM

## 2023-10-02 DIAGNOSIS — R73.01 IMPAIRED FASTING GLUCOSE: ICD-10-CM

## 2023-10-02 DIAGNOSIS — Z00.00 ANNUAL PHYSICAL EXAM: Primary | ICD-10-CM

## 2023-10-02 DIAGNOSIS — H91.91 DECREASED HEARING, RIGHT: ICD-10-CM

## 2023-10-02 DIAGNOSIS — Z12.31 ENCOUNTER FOR SCREENING MAMMOGRAM FOR MALIGNANT NEOPLASM OF BREAST: ICD-10-CM

## 2023-10-02 PROCEDURE — 90674 CCIIV4 VAC NO PRSV 0.5 ML IM: CPT | Performed by: INTERNAL MEDICINE

## 2023-10-02 PROCEDURE — 3074F SYST BP LT 130 MM HG: CPT | Performed by: INTERNAL MEDICINE

## 2023-10-02 PROCEDURE — 90677 PCV20 VACCINE IM: CPT | Performed by: INTERNAL MEDICINE

## 2023-10-02 PROCEDURE — 90471 IMMUNIZATION ADMIN: CPT | Performed by: INTERNAL MEDICINE

## 2023-10-02 PROCEDURE — 99396 PREV VISIT EST AGE 40-64: CPT | Performed by: INTERNAL MEDICINE

## 2023-10-02 PROCEDURE — 90472 IMMUNIZATION ADMIN EACH ADD: CPT | Performed by: INTERNAL MEDICINE

## 2023-10-02 PROCEDURE — 3078F DIAST BP <80 MM HG: CPT | Performed by: INTERNAL MEDICINE

## 2023-10-02 SDOH — ECONOMIC STABILITY: FOOD INSECURITY: WITHIN THE PAST 12 MONTHS, THE FOOD YOU BOUGHT JUST DIDN'T LAST AND YOU DIDN'T HAVE MONEY TO GET MORE.: NEVER TRUE

## 2023-10-02 SDOH — ECONOMIC STABILITY: FOOD INSECURITY: WITHIN THE PAST 12 MONTHS, YOU WORRIED THAT YOUR FOOD WOULD RUN OUT BEFORE YOU GOT MONEY TO BUY MORE.: NEVER TRUE

## 2023-10-02 SDOH — ECONOMIC STABILITY: HOUSING INSECURITY
IN THE LAST 12 MONTHS, WAS THERE A TIME WHEN YOU DID NOT HAVE A STEADY PLACE TO SLEEP OR SLEPT IN A SHELTER (INCLUDING NOW)?: NO

## 2023-10-02 SDOH — ECONOMIC STABILITY: INCOME INSECURITY: HOW HARD IS IT FOR YOU TO PAY FOR THE VERY BASICS LIKE FOOD, HOUSING, MEDICAL CARE, AND HEATING?: NOT HARD AT ALL

## 2023-10-02 ASSESSMENT — PATIENT HEALTH QUESTIONNAIRE - PHQ9
8. MOVING OR SPEAKING SO SLOWLY THAT OTHER PEOPLE COULD HAVE NOTICED. OR THE OPPOSITE, BEING SO FIGETY OR RESTLESS THAT YOU HAVE BEEN MOVING AROUND A LOT MORE THAN USUAL: 0
3. TROUBLE FALLING OR STAYING ASLEEP: 0
2. FEELING DOWN, DEPRESSED OR HOPELESS: 1
SUM OF ALL RESPONSES TO PHQ QUESTIONS 1-9: 5
4. FEELING TIRED OR HAVING LITTLE ENERGY: 3
1. LITTLE INTEREST OR PLEASURE IN DOING THINGS: 1
SUM OF ALL RESPONSES TO PHQ9 QUESTIONS 1 & 2: 2
SUM OF ALL RESPONSES TO PHQ QUESTIONS 1-9: 5
SUM OF ALL RESPONSES TO PHQ QUESTIONS 1-9: 5
5. POOR APPETITE OR OVEREATING: 0
SUM OF ALL RESPONSES TO PHQ QUESTIONS 1-9: 5
10. IF YOU CHECKED OFF ANY PROBLEMS, HOW DIFFICULT HAVE THESE PROBLEMS MADE IT FOR YOU TO DO YOUR WORK, TAKE CARE OF THINGS AT HOME, OR GET ALONG WITH OTHER PEOPLE: 0
7. TROUBLE CONCENTRATING ON THINGS, SUCH AS READING THE NEWSPAPER OR WATCHING TELEVISION: 0
9. THOUGHTS THAT YOU WOULD BE BETTER OFF DEAD, OR OF HURTING YOURSELF: 0
6. FEELING BAD ABOUT YOURSELF - OR THAT YOU ARE A FAILURE OR HAVE LET YOURSELF OR YOUR FAMILY DOWN: 0

## 2023-10-02 NOTE — PATIENT INSTRUCTIONS
PF, 0.5mL 10/20/2020    Influenza, FLUBLOK, (age 25 y+), PF, 0.5mL 10/28/2022    Influenza, FLUCELVAX, (age 10 mo+), MDCK, PF, 0.5mL 10/03/2017, 12/22/2021, 10/02/2023    Pneumococcal, PPSV23, PNEUMOVAX 23, (age 2y+), SC/IM, 0.5mL 10/15/2013    TDaP, ADACEL (age 6y-58y), Geralynn Bandar (age 10y+), IM, 0.5mL 10/15/2013        Influenza vaccine:  recommended every fall-- Given today    Pneumonia vaccine: Due now-- Given today    Tetanus vaccine:  tetanus and diptheria/Pertussis vaccine (Td/Tdap) recommended every 10 years- Due Now     Shingles vaccine: Overdue    Hepatitis B Vaccine: Due now. Hepatitis B #1 and then #2 1 month later    COVID-19-- Obtain at Pharmacy    RSV Vaccine-- Obtain at Pharmacy. Script             Additional Recommendations   1. Use Sunscreen daily when exposed to the sun to reduce the risk of skin cancer. 2. Continue a healthy lifestyle including a healthy diet and aerobic exercise  3. Update your eye exam every 2 years  4. Always wear a seatbelt while in a car  5. Always wear a helmet when riding a bike or motorcycle  6. Update your eye exam yearly. Here are a few  Reliable websites with a variety of health and wellness information:   www.mylifecheck. heart. org     www.nutritionsource. org     www. americanheart. org     www. diabetes. org      www.menopause. org     www.[email or web address]     [email or web address])

## 2023-10-02 NOTE — PROGRESS NOTES
[facility]) Physicians  Internal Medicine  Patient Encounter  Venu CARITO Dale, [facility] Preventative Physical    Chief Complaint   Patient presents with    Annual Exam       HPI-- 59 y.o. female presents today for a complete annual physical.      Medical/Surgical Histories     Past Medical History:   Diagnosis Date    Adenomatous colon polyp 11/2017    Dr. America Lala 01/14/2014    Asthma     seasonal asthma    Atypical ductal hyperplasia, breast     Bilateral kidney stones 12/27/2018    Cellulitis, lip     mrsa    Cervical spinal stenosis     Cholelithiasis 06/2018    Clotting disorder ([address])     blood clot in leg as teenager bcp    COVID-19 virus infection 08/2021    Crohn's disease ([address])     DDD (degenerative disc disease), cervical     DDD (degenerative disc disease), lumbar     Depression 01/14/2014    DVT, lower extremity ([address]) 1979    Due to birth control pills    Fibrocystic breast     Fibromyalgia 10/15/2013    GERD (gastroesophageal reflux disease)     Hormone disorder     Hx MRSA infection     pt states she has had several episodes of mrsa infections recently on stomach, which was tx'ed w/ antibiotics and is now (4/2013) scabbed over      Hyperlipidemia     Hypertension     Insomnia     interruption insomnia    Migraine     MS (multiple sclerosis) ([address]) 06/2010    Multinodular goiter     Nausea & vomiting     Osteoarthritis     hips and shoulders    Polyp of colon, adenomatous 11/22/2017    PONV (postoperative nausea and vomiting)     severe    Rash     Right leg DVT ([address])     10years of age    Sleep apnea     CPAP set at 15, 11    Spondylolisthesis of lumbar region     Thyroid nodule 06/13/2013    Ureteral stone 12/16/2018    with right hydronephrosis    Ureterolithiasis 12/16/2018    with right hydronephrosis    Urinary incontinence     Venous insufficiency 03/08/2013    insufficiency & reflux of legs    Weakness of left arm     DUE TO MS          Past Surgical History:   Procedure

## 2023-10-03 LAB
25(OH)D3 SERPL-MCNC: 44.1 NG/ML
ALBUMIN SERPL-MCNC: 4.2 G/DL (ref 3.4–5)
ALBUMIN/GLOB SERPL: 1.5 {RATIO} (ref 1.1–2.2)
ALP SERPL-CCNC: 79 U/L (ref 40–129)
ALT SERPL-CCNC: 26 U/L (ref 10–40)
ANION GAP SERPL CALCULATED.3IONS-SCNC: 14 MMOL/L (ref 3–16)
AST SERPL-CCNC: 30 U/L (ref 15–37)
BASOPHILS # BLD: 0.1 K/UL (ref 0–0.2)
BASOPHILS NFR BLD: 1.6 %
BILIRUB SERPL-MCNC: 0.3 MG/DL (ref 0–1)
BUN SERPL-MCNC: 18 MG/DL (ref 7–20)
CALCIUM SERPL-MCNC: 8.8 MG/DL (ref 8.3–10.6)
CHLORIDE SERPL-SCNC: 102 MMOL/L (ref 99–110)
CHOLEST SERPL-MCNC: 155 MG/DL (ref 0–199)
CO2 SERPL-SCNC: 23 MMOL/L (ref 21–32)
CREAT SERPL-MCNC: 1.5 MG/DL (ref 0.6–1.1)
DEPRECATED RDW RBC AUTO: 14.2 % (ref 12.4–15.4)
EOSINOPHIL # BLD: 0.2 K/UL (ref 0–0.6)
EOSINOPHIL NFR BLD: 3 %
EST. AVERAGE GLUCOSE BLD GHB EST-MCNC: 96.8 MG/DL
GFR SERPLBLD CREATININE-BSD FMLA CKD-EPI: 40 ML/MIN/{1.73_M2}
GLUCOSE SERPL-MCNC: 98 MG/DL (ref 70–99)
HBA1C MFR BLD: 5 %
HCT VFR BLD AUTO: 34.9 % (ref 36–48)
HDLC SERPL-MCNC: 38 MG/DL (ref 40–60)
HGB BLD-MCNC: 11.8 G/DL (ref 12–16)
LDLC SERPL CALC-MCNC: 75 MG/DL
LYMPHOCYTES # BLD: 1.9 K/UL (ref 1–5.1)
LYMPHOCYTES NFR BLD: 30 %
MAGNESIUM SERPL-MCNC: 1.6 MG/DL (ref 1.8–2.4)
MCH RBC QN AUTO: 29.7 PG (ref 26–34)
MCHC RBC AUTO-ENTMCNC: 33.7 G/DL (ref 31–36)
MCV RBC AUTO: 88.3 FL (ref 80–100)
MONOCYTES # BLD: 0.7 K/UL (ref 0–1.3)
MONOCYTES NFR BLD: 10.8 %
NEUTROPHILS # BLD: 3.5 K/UL (ref 1.7–7.7)
NEUTROPHILS NFR BLD: 54.6 %
PLATELET # BLD AUTO: 218 K/UL (ref 135–450)
PMV BLD AUTO: 8.1 FL (ref 5–10.5)
POTASSIUM SERPL-SCNC: 4.3 MMOL/L (ref 3.5–5.1)
PROT SERPL-MCNC: 7 G/DL (ref 6.4–8.2)
RBC # BLD AUTO: 3.96 M/UL (ref 4–5.2)
SODIUM SERPL-SCNC: 139 MMOL/L (ref 136–145)
TRIGL SERPL-MCNC: 209 MG/DL (ref 0–150)
TSH SERPL DL<=0.005 MIU/L-ACNC: 2.3 UIU/ML (ref 0.27–4.2)
VIT B12 SERPL-MCNC: 399 PG/ML (ref 211–911)
VLDLC SERPL CALC-MCNC: 42 MG/DL
WBC # BLD AUTO: 6.4 K/UL (ref 4–11)

## 2023-10-05 DIAGNOSIS — N28.9 RENAL INSUFFICIENCY: Primary | ICD-10-CM

## 2023-10-11 ENCOUNTER — TELEPHONE (OUTPATIENT)
Dept: PULMONOLOGY | Age: 58
End: 2023-10-11

## 2023-10-11 NOTE — TELEPHONE ENCOUNTER
Referral from Dr. Tete Gamboa  Was last seen in 2016, was diagnosed with obstructive sleep apnea however BMI is 39 which would put her out of the inspire  Patient can be evaluated as a new sleep consult for me to see if she would like to proceed however she would not be eligible at this time for the inspire

## 2023-10-16 ENCOUNTER — NURSE ONLY (OUTPATIENT)
Dept: INTERNAL MEDICINE CLINIC | Age: 58
End: 2023-10-16
Payer: COMMERCIAL

## 2023-10-16 ENCOUNTER — PATIENT MESSAGE (OUTPATIENT)
Dept: INTERNAL MEDICINE CLINIC | Age: 58
End: 2023-10-16

## 2023-10-16 PROCEDURE — 90739 HEPB VACC 2/4 DOSE ADULT IM: CPT | Performed by: INTERNAL MEDICINE

## 2023-10-16 PROCEDURE — 90715 TDAP VACCINE 7 YRS/> IM: CPT | Performed by: INTERNAL MEDICINE

## 2023-10-16 PROCEDURE — 90472 IMMUNIZATION ADMIN EACH ADD: CPT | Performed by: INTERNAL MEDICINE

## 2023-10-16 PROCEDURE — 90471 IMMUNIZATION ADMIN: CPT | Performed by: INTERNAL MEDICINE

## 2023-10-16 NOTE — TELEPHONE ENCOUNTER
From: Lorraine Neumann  To: Dr. Mead Crick: 10/16/2023 12:32 PM EDT  Subject: Civic vaccine in pharmacy    I spoke with insurance. In order to get Civic vaccine at the pharmacy both myself and Travis Mg) will need a pre auth sent in. Also, Travis Chen needs a script for RSV vaccine at [facility] ([address]

## 2023-10-18 DIAGNOSIS — Z23 NEED FOR COVID-19 VACCINE: Primary | ICD-10-CM

## 2023-10-18 NOTE — TELEPHONE ENCOUNTER
Spoke with the patient  - she has stated the insurance plan does not cover vaccines at any pharmacy. I am unsure where to go from here. She is requesting a PA to be sent, unsure where or how this process would be done. Please help me?

## 2023-10-18 NOTE — TELEPHONE ENCOUNTER
Left a message for patient to call back - please transfer patient to me. Spoke with Eduardo Garcia to have a better understanding of what the issue was. Per [employer] plan will not cover any vaccines at [facility]. She will need to call her insurance company and ask them what pharmacy will cover the vaccines needed. [facility] does not need any order or script for this due to not being covered.

## 2023-10-23 ENCOUNTER — HOSPITAL ENCOUNTER (OUTPATIENT)
Dept: ULTRASOUND IMAGING | Age: 58
Discharge: HOME | End: 2023-10-23
Attending: INTERNAL MEDICINE
Payer: COMMERCIAL

## 2023-10-23 ENCOUNTER — HOSPITAL ENCOUNTER (OUTPATIENT)
Dept: MAMMOGRAPHY | Age: 58
Discharge: HOME | End: 2023-10-23
Attending: INTERNAL MEDICINE
Payer: COMMERCIAL

## 2023-10-23 VITALS — HEIGHT: 64 IN | BODY MASS INDEX: 39.44 KG/M2 | WEIGHT: 231 LBS

## 2023-10-23 DIAGNOSIS — E04.2 MULTIPLE THYROID NODULES: ICD-10-CM

## 2023-10-23 DIAGNOSIS — Z12.31 ENCOUNTER FOR SCREENING MAMMOGRAM FOR MALIGNANT NEOPLASM OF BREAST: ICD-10-CM

## 2023-10-23 PROCEDURE — 77063 BREAST TOMOSYNTHESIS BI: CPT

## 2023-10-23 PROCEDURE — 76536 US EXAM OF HEAD AND NECK: CPT

## 2023-10-25 NOTE — TELEPHONE ENCOUNTER
Last appointment: 10/2/2023  Next appointment: 4/3/2024  Last refill: 81/2023 normal shape/ROM intact/strength intact

## 2023-10-26 RX ORDER — OMEPRAZOLE 40 MG/1
40 CAPSULE, DELAYED RELEASE ORAL 2 TIMES DAILY
Qty: 180 CAPSULE | Refills: 3 | Status: SHIPPED | OUTPATIENT
Start: 2023-10-26

## 2023-10-26 RX ORDER — NAPROXEN 500 MG/1
TABLET ORAL
Qty: 180 TABLET | Refills: 0 | Status: SHIPPED | OUTPATIENT
Start: 2023-10-26

## 2023-10-26 RX ORDER — FENOFIBRATE 160 MG/1
160 TABLET ORAL DAILY
Qty: 90 TABLET | Refills: 3 | Status: SHIPPED | OUTPATIENT
Start: 2023-10-26

## 2023-11-13 ENCOUNTER — NURSE ONLY (OUTPATIENT)
Dept: INTERNAL MEDICINE CLINIC | Age: 58
End: 2023-11-13
Payer: COMMERCIAL

## 2023-11-13 DIAGNOSIS — N28.9 RENAL INSUFFICIENCY: Primary | ICD-10-CM

## 2023-11-13 DIAGNOSIS — N28.9 RENAL INSUFFICIENCY: ICD-10-CM

## 2023-11-13 PROCEDURE — 90739 HEPB VACC 2/4 DOSE ADULT IM: CPT | Performed by: INTERNAL MEDICINE

## 2023-11-13 PROCEDURE — 90471 IMMUNIZATION ADMIN: CPT | Performed by: INTERNAL MEDICINE

## 2023-11-14 LAB
ANION GAP SERPL CALCULATED.3IONS-SCNC: 15 MMOL/L (ref 3–16)
BUN SERPL-MCNC: 15 MG/DL (ref 7–20)
CALCIUM SERPL-MCNC: 9.8 MG/DL (ref 8.3–10.6)
CHLORIDE SERPL-SCNC: 99 MMOL/L (ref 99–110)
CO2 SERPL-SCNC: 24 MMOL/L (ref 21–32)
CREAT SERPL-MCNC: 1 MG/DL (ref 0.6–1.1)
GFR SERPLBLD CREATININE-BSD FMLA CKD-EPI: >60 ML/MIN/{1.73_M2}
GLUCOSE SERPL-MCNC: 96 MG/DL (ref 70–99)
POTASSIUM SERPL-SCNC: 4.3 MMOL/L (ref 3.5–5.1)
SODIUM SERPL-SCNC: 138 MMOL/L (ref 136–145)

## 2023-12-01 DIAGNOSIS — G47.00 INSOMNIA, UNSPECIFIED TYPE: ICD-10-CM

## 2023-12-01 RX ORDER — DULOXETIN HYDROCHLORIDE 60 MG/1
60 CAPSULE, DELAYED RELEASE ORAL 2 TIMES DAILY
Qty: 180 CAPSULE | Refills: 3 | Status: SHIPPED | OUTPATIENT
Start: 2023-12-01

## 2023-12-01 RX ORDER — HYDROCHLOROTHIAZIDE 25 MG/1
25 TABLET ORAL DAILY
Qty: 90 TABLET | Refills: 3 | Status: SHIPPED | OUTPATIENT
Start: 2023-12-01

## 2023-12-01 RX ORDER — GABAPENTIN 100 MG/1
CAPSULE ORAL
Qty: 270 CAPSULE | Refills: 3 | Status: SHIPPED | OUTPATIENT
Start: 2023-12-01 | End: 2024-12-01

## 2023-12-01 RX ORDER — TRAZODONE HYDROCHLORIDE 150 MG/1
150 TABLET ORAL NIGHTLY
Qty: 90 TABLET | Refills: 3 | Status: SHIPPED | OUTPATIENT
Start: 2023-12-01

## 2024-03-14 RX ORDER — ALBUTEROL SULFATE 90 UG/1
2 AEROSOL, METERED RESPIRATORY (INHALATION) EVERY 6 HOURS PRN
Qty: 3 EACH | Refills: 3 | Status: SHIPPED | OUTPATIENT
Start: 2024-03-14

## 2024-04-01 RX ORDER — ALBUTEROL SULFATE 90 UG/1
2 AEROSOL, METERED RESPIRATORY (INHALATION) EVERY 6 HOURS PRN
Qty: 3 EACH | Refills: 3 | Status: SHIPPED | OUTPATIENT
Start: 2024-04-01

## 2024-04-01 NOTE — TELEPHONE ENCOUNTER
Requested Prescriptions     Pending Prescriptions Disp Refills    albuterol sulfate HFA (PROVENTIL;VENTOLIN;PROAIR) 108 (90 Base) MCG/ACT inhaler 3 each 3     Sig: Inhale 2 puffs into the lungs every 6 hours as needed for Wheezing for wheezing     Last OV - 10/2/23  Next OV - 4/3/24  Last refill - 3/14/24  Last labs - 11/13/23

## 2024-04-24 RX ORDER — PROPRANOLOL HYDROCHLORIDE 80 MG/1
80 CAPSULE, EXTENDED RELEASE ORAL DAILY
Qty: 90 CAPSULE | Refills: 3 | Status: SHIPPED | OUTPATIENT
Start: 2024-04-24

## 2024-04-24 RX ORDER — BUDESONIDE AND FORMOTEROL FUMARATE DIHYDRATE 160; 4.5 UG/1; UG/1
AEROSOL RESPIRATORY (INHALATION)
Qty: 30.6 G | Refills: 3 | Status: SHIPPED | OUTPATIENT
Start: 2024-04-24

## 2024-06-17 ENCOUNTER — OFFICE VISIT (OUTPATIENT)
Dept: INTERNAL MEDICINE CLINIC | Age: 59
End: 2024-06-17
Payer: COMMERCIAL

## 2024-06-17 VITALS
TEMPERATURE: 97.1 F | HEART RATE: 89 BPM | BODY MASS INDEX: 40.8 KG/M2 | OXYGEN SATURATION: 98 % | HEIGHT: 64 IN | DIASTOLIC BLOOD PRESSURE: 99 MMHG | WEIGHT: 239 LBS | SYSTOLIC BLOOD PRESSURE: 158 MMHG

## 2024-06-17 DIAGNOSIS — G35 MULTIPLE SCLEROSIS (HCC): ICD-10-CM

## 2024-06-17 DIAGNOSIS — M10.9 PODAGRA: ICD-10-CM

## 2024-06-17 DIAGNOSIS — I10 BENIGN ESSENTIAL HTN: Primary | ICD-10-CM

## 2024-06-17 DIAGNOSIS — K21.9 GASTROESOPHAGEAL REFLUX DISEASE WITHOUT ESOPHAGITIS: ICD-10-CM

## 2024-06-17 DIAGNOSIS — R73.01 IMPAIRED FASTING GLUCOSE: ICD-10-CM

## 2024-06-17 DIAGNOSIS — G47.33 OSA (OBSTRUCTIVE SLEEP APNEA): ICD-10-CM

## 2024-06-17 DIAGNOSIS — F43.23 ADJUSTMENT DISORDER WITH MIXED ANXIETY AND DEPRESSED MOOD: ICD-10-CM

## 2024-06-17 DIAGNOSIS — M79.7 FIBROMYALGIA: ICD-10-CM

## 2024-06-17 PROCEDURE — 3075F SYST BP GE 130 - 139MM HG: CPT | Performed by: INTERNAL MEDICINE

## 2024-06-17 PROCEDURE — 3080F DIAST BP >= 90 MM HG: CPT | Performed by: INTERNAL MEDICINE

## 2024-06-17 PROCEDURE — 99214 OFFICE O/P EST MOD 30 MIN: CPT | Performed by: INTERNAL MEDICINE

## 2024-06-17 RX ORDER — INDOMETHACIN 50 MG/1
50 CAPSULE ORAL
Qty: 30 CAPSULE | Refills: 2 | Status: SHIPPED | OUTPATIENT
Start: 2024-06-17 | End: 2024-06-17 | Stop reason: CLARIF

## 2024-06-17 RX ORDER — BACLOFEN 20 MG/1
20 TABLET ORAL 3 TIMES DAILY
Qty: 270 TABLET | Refills: 3 | Status: SHIPPED | OUTPATIENT
Start: 2024-06-17

## 2024-06-17 RX ORDER — BACLOFEN 20 MG/1
20 TABLET ORAL 3 TIMES DAILY
COMMUNITY
Start: 2024-04-24 | End: 2024-06-17 | Stop reason: SDUPTHER

## 2024-06-17 RX ORDER — OMEPRAZOLE 40 MG/1
40 CAPSULE, DELAYED RELEASE ORAL 2 TIMES DAILY
Qty: 180 CAPSULE | Refills: 3 | Status: SHIPPED | OUTPATIENT
Start: 2024-06-17

## 2024-06-17 RX ORDER — INDOMETHACIN 50 MG/1
50 CAPSULE ORAL
Qty: 30 CAPSULE | Refills: 2 | Status: SHIPPED | OUTPATIENT
Start: 2024-06-17 | End: 2024-06-27

## 2024-06-17 ASSESSMENT — PATIENT HEALTH QUESTIONNAIRE - PHQ9
SUM OF ALL RESPONSES TO PHQ QUESTIONS 1-9: 6
SUM OF ALL RESPONSES TO PHQ9 QUESTIONS 1 & 2: 0
1. LITTLE INTEREST OR PLEASURE IN DOING THINGS: NOT AT ALL
SUM OF ALL RESPONSES TO PHQ QUESTIONS 1-9: 6
5. POOR APPETITE OR OVEREATING: NOT AT ALL
SUM OF ALL RESPONSES TO PHQ QUESTIONS 1-9: 6
3. TROUBLE FALLING OR STAYING ASLEEP: NEARLY EVERY DAY
7. TROUBLE CONCENTRATING ON THINGS, SUCH AS READING THE NEWSPAPER OR WATCHING TELEVISION: NOT AT ALL
6. FEELING BAD ABOUT YOURSELF - OR THAT YOU ARE A FAILURE OR HAVE LET YOURSELF OR YOUR FAMILY DOWN: NOT AT ALL
SUM OF ALL RESPONSES TO PHQ QUESTIONS 1-9: 6
4. FEELING TIRED OR HAVING LITTLE ENERGY: NEARLY EVERY DAY
9. THOUGHTS THAT YOU WOULD BE BETTER OFF DEAD, OR OF HURTING YOURSELF: NOT AT ALL
8. MOVING OR SPEAKING SO SLOWLY THAT OTHER PEOPLE COULD HAVE NOTICED. OR THE OPPOSITE, BEING SO FIGETY OR RESTLESS THAT YOU HAVE BEEN MOVING AROUND A LOT MORE THAN USUAL: NOT AT ALL
10. IF YOU CHECKED OFF ANY PROBLEMS, HOW DIFFICULT HAVE THESE PROBLEMS MADE IT FOR YOU TO DO YOUR WORK, TAKE CARE OF THINGS AT HOME, OR GET ALONG WITH OTHER PEOPLE: NOT DIFFICULT AT ALL
2. FEELING DOWN, DEPRESSED OR HOPELESS: NOT AT ALL

## 2024-06-17 ASSESSMENT — ANXIETY QUESTIONNAIRES
2. NOT BEING ABLE TO STOP OR CONTROL WORRYING: NOT AT ALL
IF YOU CHECKED OFF ANY PROBLEMS ON THIS QUESTIONNAIRE, HOW DIFFICULT HAVE THESE PROBLEMS MADE IT FOR YOU TO DO YOUR WORK, TAKE CARE OF THINGS AT HOME, OR GET ALONG WITH OTHER PEOPLE: NOT DIFFICULT AT ALL
3. WORRYING TOO MUCH ABOUT DIFFERENT THINGS: NOT AT ALL
5. BEING SO RESTLESS THAT IT IS HARD TO SIT STILL: NOT AT ALL
GAD7 TOTAL SCORE: 1
1. FEELING NERVOUS, ANXIOUS, OR ON EDGE: NOT AT ALL
7. FEELING AFRAID AS IF SOMETHING AWFUL MIGHT HAPPEN: NOT AT ALL
4. TROUBLE RELAXING: NOT AT ALL
6. BECOMING EASILY ANNOYED OR IRRITABLE: SEVERAL DAYS

## 2024-06-17 NOTE — PROGRESS NOTES
concern  MS: Left first MTP joint with erythema, edema, and exquisite tenderness with palpation and range of motion.    ASSESSMENT/PLAN:    1. Benign essential HTN  Blood pressure is fairly well-controlled but somewhat accelerated today in the office  Patient states she did not take her medication today  Continue propranolol extended release 80 mg daily, hydrochlorothiazide 25 mg daily  May need additional treatment  Continue efforts with lifestyle modification including a no added salt diet, calorie restriction and regular exercise  - CBC with Auto Differential; Future  - Comprehensive Metabolic Panel; Future  - Lipid Panel; Future    2. Multiple sclerosis (HCC)  Under the care of of neurology, Dr. Singer  Refill baclofen  - baclofen (LIORESAL) 20 MG tablet; Take 1 tablet by mouth 3 times daily  Dispense: 270 tablet; Refill: 3    3. Impaired fasting glucose  Stability and control are uncertain  Check A1c to ensure she has not transitioned to type 2 diabetes  - Comprehensive Metabolic Panel; Future  - Hemoglobin A1C; Future    4. Fibromyalgia  Much improved with duloxetine  Continue duloxetine and baclofen  Recommend regular physical activity, such as aquatics, stretching, walking    5. Podagra  This is a new problem  Acute episode is improving  Will add some indomethacin 50 mg 3 times a day with food  We reviewed the potential adverse side effects of NSAIDs  Check uric acid  Purine-restricted diet recommended.  Patient provided literature  - indomethacin (INDOCIN) 50 MG capsule; Take 1 capsule by mouth 3 times daily (with meals) for 10 days  Dispense: 30 capsule; Refill: 2  - Uric Acid; Future    6. Gastroesophageal reflux disease without esophagitis  Her acid reflux is not controlled  We will try to increase omeprazole to 40 mg twice daily.  Patient states her insurance does not cover twice daily  - omeprazole (PRILOSEC) 40 MG delayed release capsule; Take 1 capsule by mouth 2 times daily  Dispense: 180 capsule;

## 2024-06-18 DIAGNOSIS — Z78.0 MENOPAUSE: ICD-10-CM

## 2024-06-19 RX ORDER — CLONIDINE HYDROCHLORIDE 0.2 MG/1
TABLET ORAL
Qty: 90 TABLET | Refills: 3 | Status: SHIPPED | OUTPATIENT
Start: 2024-06-19

## 2024-07-03 DIAGNOSIS — I10 BENIGN ESSENTIAL HTN: ICD-10-CM

## 2024-07-03 DIAGNOSIS — M10.9 PODAGRA: ICD-10-CM

## 2024-07-03 DIAGNOSIS — K21.9 GASTROESOPHAGEAL REFLUX DISEASE WITHOUT ESOPHAGITIS: ICD-10-CM

## 2024-07-03 DIAGNOSIS — R73.01 IMPAIRED FASTING GLUCOSE: ICD-10-CM

## 2024-07-03 LAB
ALBUMIN SERPL-MCNC: 4.5 G/DL (ref 3.4–5)
ALBUMIN/GLOB SERPL: 1.3 {RATIO} (ref 1.1–2.2)
ALP SERPL-CCNC: 70 U/L (ref 40–129)
ALT SERPL-CCNC: 46 U/L (ref 10–40)
ANION GAP SERPL CALCULATED.3IONS-SCNC: 14 MMOL/L (ref 3–16)
AST SERPL-CCNC: 39 U/L (ref 15–37)
BASOPHILS # BLD: 0.1 K/UL (ref 0–0.2)
BASOPHILS NFR BLD: 1.9 %
BILIRUB SERPL-MCNC: 0.6 MG/DL (ref 0–1)
BUN SERPL-MCNC: 16 MG/DL (ref 7–20)
CALCIUM SERPL-MCNC: 9.8 MG/DL (ref 8.3–10.6)
CHLORIDE SERPL-SCNC: 100 MMOL/L (ref 99–110)
CHOLEST SERPL-MCNC: 174 MG/DL (ref 0–199)
CO2 SERPL-SCNC: 25 MMOL/L (ref 21–32)
CREAT SERPL-MCNC: 1.2 MG/DL (ref 0.6–1.2)
DEPRECATED RDW RBC AUTO: 13.5 % (ref 12.4–15.4)
EOSINOPHIL # BLD: 0.2 K/UL (ref 0–0.6)
EOSINOPHIL NFR BLD: 3.5 %
GFR SERPLBLD CREATININE-BSD FMLA CKD-EPI: 52 ML/MIN/{1.73_M2}
GLUCOSE SERPL-MCNC: 123 MG/DL (ref 70–99)
HCT VFR BLD AUTO: 38.7 % (ref 36–48)
HDLC SERPL-MCNC: 41 MG/DL (ref 40–60)
HGB BLD-MCNC: 13.2 G/DL (ref 12–16)
LDLC SERPL CALC-MCNC: 87 MG/DL
LYMPHOCYTES # BLD: 2.5 K/UL (ref 1–5.1)
LYMPHOCYTES NFR BLD: 43.2 %
MAGNESIUM SERPL-MCNC: 1.9 MG/DL (ref 1.8–2.4)
MCH RBC QN AUTO: 30.3 PG (ref 26–34)
MCHC RBC AUTO-ENTMCNC: 34.3 G/DL (ref 31–36)
MCV RBC AUTO: 88.4 FL (ref 80–100)
MONOCYTES # BLD: 0.5 K/UL (ref 0–1.3)
MONOCYTES NFR BLD: 8.5 %
NEUTROPHILS # BLD: 2.4 K/UL (ref 1.7–7.7)
NEUTROPHILS NFR BLD: 42.9 %
PLATELET # BLD AUTO: 223 K/UL (ref 135–450)
PMV BLD AUTO: 8.1 FL (ref 5–10.5)
POTASSIUM SERPL-SCNC: 4.2 MMOL/L (ref 3.5–5.1)
PROT SERPL-MCNC: 8.1 G/DL (ref 6.4–8.2)
RBC # BLD AUTO: 4.38 M/UL (ref 4–5.2)
SODIUM SERPL-SCNC: 139 MMOL/L (ref 136–145)
TRIGL SERPL-MCNC: 231 MG/DL (ref 0–150)
URATE SERPL-MCNC: 7.9 MG/DL (ref 2.6–6)
VLDLC SERPL CALC-MCNC: 46 MG/DL
WBC # BLD AUTO: 5.7 K/UL (ref 4–11)

## 2024-07-04 LAB
EST. AVERAGE GLUCOSE BLD GHB EST-MCNC: 99.7 MG/DL
HBA1C MFR BLD: 5.1 %

## 2024-10-04 ENCOUNTER — OFFICE VISIT (OUTPATIENT)
Dept: INTERNAL MEDICINE CLINIC | Age: 59
End: 2024-10-04

## 2024-10-04 VITALS
HEART RATE: 70 BPM | WEIGHT: 231 LBS | BODY MASS INDEX: 40.93 KG/M2 | SYSTOLIC BLOOD PRESSURE: 112 MMHG | HEIGHT: 63 IN | DIASTOLIC BLOOD PRESSURE: 66 MMHG | OXYGEN SATURATION: 97 %

## 2024-10-04 DIAGNOSIS — I10 BENIGN ESSENTIAL HTN: ICD-10-CM

## 2024-10-04 DIAGNOSIS — Z12.31 ENCOUNTER FOR SCREENING MAMMOGRAM FOR MALIGNANT NEOPLASM OF BREAST: ICD-10-CM

## 2024-10-04 DIAGNOSIS — Z23 NEED FOR SHINGLES VACCINE: ICD-10-CM

## 2024-10-04 DIAGNOSIS — Z00.00 ANNUAL PHYSICAL EXAM: Primary | ICD-10-CM

## 2024-10-04 DIAGNOSIS — R73.01 IMPAIRED FASTING GLUCOSE: ICD-10-CM

## 2024-10-04 DIAGNOSIS — M10.9 GOUT, UNSPECIFIED CAUSE, UNSPECIFIED CHRONICITY, UNSPECIFIED SITE: ICD-10-CM

## 2024-10-04 DIAGNOSIS — Z13.1 SCREENING FOR DIABETES MELLITUS: ICD-10-CM

## 2024-10-04 DIAGNOSIS — E83.42 HYPOMAGNESEMIA: ICD-10-CM

## 2024-10-04 DIAGNOSIS — Z23 NEED FOR VACCINATION: ICD-10-CM

## 2024-10-04 DIAGNOSIS — K21.9 GASTROESOPHAGEAL REFLUX DISEASE WITHOUT ESOPHAGITIS: ICD-10-CM

## 2024-10-04 DIAGNOSIS — D12.6 ADENOMATOUS POLYP OF COLON, UNSPECIFIED PART OF COLON: ICD-10-CM

## 2024-10-04 DIAGNOSIS — Z00.00 ANNUAL PHYSICAL EXAM: ICD-10-CM

## 2024-10-04 SDOH — ECONOMIC STABILITY: FOOD INSECURITY: WITHIN THE PAST 12 MONTHS, YOU WORRIED THAT YOUR FOOD WOULD RUN OUT BEFORE YOU GOT MONEY TO BUY MORE.: NEVER TRUE

## 2024-10-04 SDOH — ECONOMIC STABILITY: FOOD INSECURITY: WITHIN THE PAST 12 MONTHS, THE FOOD YOU BOUGHT JUST DIDN'T LAST AND YOU DIDN'T HAVE MONEY TO GET MORE.: NEVER TRUE

## 2024-10-04 SDOH — ECONOMIC STABILITY: INCOME INSECURITY: HOW HARD IS IT FOR YOU TO PAY FOR THE VERY BASICS LIKE FOOD, HOUSING, MEDICAL CARE, AND HEATING?: NOT HARD AT ALL

## 2024-10-04 NOTE — PATIENT INSTRUCTIONS
Preventive plan of care for Alethea Rodriguez        10/4/2024           Preventive Measures Status       Recommendations for screening   Colon Cancer Screen   Last colonoscopy: 1/19/2024 Due every 5 years.  Colonoscopy due in 2029   Breast Cancer Screen  Last mammogram: 10/23/2023 Mammogram due yearly.  Due 10/23/2024.    Cervical Cancer Screen   Last PAP smear: 1995 Repeat screening is not clinically indicated due to hysterectomy status.  Recommend follow-up with gynecology for annual exam   Osteoporosis Screen   Last DXA scan: None on file This test is not clinically indicated   Diabetes Screen  Glucose (mg/dL)   Date Value   07/03/2024 123 (H)   05/06/2011 100 (H)    Test recommended and ordered   Cholesterol Screen  Lab Results   Component Value Date    CHOL 174 07/03/2024    TRIG 231 (H) 07/03/2024    HDL 41 07/03/2024    LDLDIRECT 79 07/11/2012    Test recommended and ordered   Hepatitis C screening recommended for those between the ages of 18-79--7/12/2016     No need to repeat at this time   HIV screening recommended for those ages 15-65 NO MATTER THE RISK FOR HIV--7/12/2016     No need to repeat at this time   Aspirin for Cardiovascular Prevention   No Not indicated   Weight: Body mass index is 41.58 kg/m².  1.588 m (5' 2.5\")104.8 kg (231 lb)    Your BMI is 25 or greater, which indicates that you are overweight    Recommended Immunizations    Immunization History   Administered Date(s) Administered    COVID-19, PFIZER Bivalent, DO NOT Dilute, (age 12y+), IM, 30 mcg/0.3 mL 10/28/2022    COVID-19, PFIZER GRAY top, DO NOT Dilute, (age 12 y+), IM, 30 mcg/0.3 mL 05/09/2022    COVID-19, PFIZER PURPLE top, DILUTE for use, (age 12 y+), 30mcg/0.3mL 03/17/2021, 04/08/2021, 10/03/2021    Hep B, HEPLISAV-B, (age 18y+), IM, 0.5mL 10/16/2023, 11/13/2023    Influenza 10/25/2011    Influenza Virus Vaccine 10/08/2012, 09/14/2013, 11/06/2014, 11/16/2015, 10/19/2018    Influenza, FLUARIX, FLULAVAL, FLUZONE (age 6 mo+) and

## 2024-10-04 NOTE — PROGRESS NOTES
or edema of extremities, or claudication  GASTROINTESTINAL:  Neg   Nausea, vomiting, or diarrhea, constipation, hematemesis, heart burn, dysphagia,change in bowel movements or stool caliber, hematochezia, melena, abdominal pain, or food intolerance. Colonoscopy: Yes, 1/19/2024, 5-year recall.  Overdue  GENITOURINARY:  Neg  Urinary frequency, hesitancy, urgency, polyuria, dysuria, hematuria, nocturia, incontinence, change in stream, genital pain or swelling, kidney stones, STD's. PAP/COLE: Yes.  HEMATOLOGIC/LYMPHATIC:  Neg  Anemia, bleeding dyscrasias, easy bruising, blood clots (DVT/PE), transfusions, or enlarged lymph nodes  MUSCULOSKELETAL:  Neg  New myalgias, bone pain, joint pain, joint swelling, low back pain, neck pain, radicular pain, or fractures.  NEUROLOGICAL:  Neg  Loss of Consciousness, memeory loss or forgetfulness, confusion, difficulty concentrating, seizures, insomina, aphasia or dysarthria, unilateral weakness or paresthesias, ataxia, headaches, syncope, tremor, or H/O head trauma.  She sees Dr. Singer or NP.    PSYCHIATRIC:  Neg  Depression, anxiety, personality changes, nervousness, drug or alcohol use/abuse, H/O psych counseling: Yes, Psychotropics: Yes  SKIN :  Neg  Rash, nail changes, sun burns, tattoos, change in moles, or skin color changes  ENDOCRINE:  Neg  Polydipsia,polyuria,abnormal weight changes,heat /cold intolerance, Hair changes. No H/O Diabetes or Thyroid disease.     Preventive Care:    Health Maintenance   Topic Date Due    Respiratory Syncytial Virus (RSV) Pregnant or age 60 yrs+ (1 - 1-dose 60+ series) Never done    COVID-19 Vaccine (6 - 2023-24 season) 09/01/2024    Breast cancer screen  10/23/2024    Shingles vaccine (2 of 2) 11/29/2024    Depression Monitoring  06/17/2025    A1C test (Diabetic or Prediabetic)  07/03/2025    GFR test (Diabetes, CKD 3-4, OR last GFR 15-59)  07/03/2025    Colorectal Cancer Screen  01/19/2029    Lipids  07/03/2029    DTaP/Tdap/Td vaccine (3 - Td

## 2024-10-05 LAB
ALBUMIN SERPL-MCNC: 4.5 G/DL (ref 3.4–5)
ALBUMIN/GLOB SERPL: 1.3 {RATIO} (ref 1.1–2.2)
ALP SERPL-CCNC: 65 U/L (ref 40–129)
ALT SERPL-CCNC: 69 U/L (ref 10–40)
ANION GAP SERPL CALCULATED.3IONS-SCNC: 13 MMOL/L (ref 3–16)
AST SERPL-CCNC: 68 U/L (ref 15–37)
BASOPHILS # BLD: 0.1 K/UL (ref 0–0.2)
BASOPHILS NFR BLD: 1.6 %
BILIRUB SERPL-MCNC: 0.5 MG/DL (ref 0–1)
BUN SERPL-MCNC: 14 MG/DL (ref 7–20)
CALCIUM SERPL-MCNC: 10 MG/DL (ref 8.3–10.6)
CHLORIDE SERPL-SCNC: 100 MMOL/L (ref 99–110)
CHOLEST SERPL-MCNC: 171 MG/DL (ref 0–199)
CO2 SERPL-SCNC: 27 MMOL/L (ref 21–32)
CREAT SERPL-MCNC: 1.1 MG/DL (ref 0.6–1.2)
DEPRECATED RDW RBC AUTO: 14.1 % (ref 12.4–15.4)
EOSINOPHIL # BLD: 0.3 K/UL (ref 0–0.6)
EOSINOPHIL NFR BLD: 3 %
EST. AVERAGE GLUCOSE BLD GHB EST-MCNC: 108.3 MG/DL
GFR SERPLBLD CREATININE-BSD FMLA CKD-EPI: 57 ML/MIN/{1.73_M2}
GLUCOSE SERPL-MCNC: 92 MG/DL (ref 70–99)
HBA1C MFR BLD: 5.4 %
HCT VFR BLD AUTO: 41.6 % (ref 36–48)
HDLC SERPL-MCNC: 44 MG/DL (ref 40–60)
HGB BLD-MCNC: 13.9 G/DL (ref 12–16)
LDLC SERPL CALC-MCNC: 95 MG/DL
LYMPHOCYTES # BLD: 2.8 K/UL (ref 1–5.1)
LYMPHOCYTES NFR BLD: 33.4 %
MAGNESIUM SERPL-MCNC: 1.69 MG/DL (ref 1.8–2.4)
MCH RBC QN AUTO: 29.8 PG (ref 26–34)
MCHC RBC AUTO-ENTMCNC: 33.4 G/DL (ref 31–36)
MCV RBC AUTO: 89 FL (ref 80–100)
MONOCYTES # BLD: 0.7 K/UL (ref 0–1.3)
MONOCYTES NFR BLD: 7.9 %
NEUTROPHILS # BLD: 4.5 K/UL (ref 1.7–7.7)
NEUTROPHILS NFR BLD: 54.1 %
PLATELET # BLD AUTO: 207 K/UL (ref 135–450)
PLATELET BLD QL SMEAR: ADEQUATE
PMV BLD AUTO: 9.7 FL (ref 5–10.5)
POTASSIUM SERPL-SCNC: 4.1 MMOL/L (ref 3.5–5.1)
PROT SERPL-MCNC: 7.9 G/DL (ref 6.4–8.2)
RBC # BLD AUTO: 4.67 M/UL (ref 4–5.2)
RBC MORPH BLD: NORMAL
SLIDE REVIEW: NORMAL
SODIUM SERPL-SCNC: 140 MMOL/L (ref 136–145)
TRIGL SERPL-MCNC: 159 MG/DL (ref 0–150)
TSH SERPL DL<=0.005 MIU/L-ACNC: 1.91 UIU/ML (ref 0.27–4.2)
URATE SERPL-MCNC: 7.7 MG/DL (ref 2.6–6)
VIT B12 SERPL-MCNC: 513 PG/ML (ref 211–911)
VLDLC SERPL CALC-MCNC: 32 MG/DL
WBC # BLD AUTO: 8.3 K/UL (ref 4–11)

## 2024-10-06 ENCOUNTER — PATIENT MESSAGE (OUTPATIENT)
Dept: INTERNAL MEDICINE CLINIC | Age: 59
End: 2024-10-06

## 2024-10-10 RX ORDER — ALBUTEROL SULFATE 90 UG/1
2 INHALANT RESPIRATORY (INHALATION) EVERY 6 HOURS PRN
Qty: 3 EACH | Refills: 3 | Status: SHIPPED | OUTPATIENT
Start: 2024-10-10

## 2024-10-17 ENCOUNTER — TELEPHONE (OUTPATIENT)
Dept: ADMINISTRATIVE | Age: 59
End: 2024-10-17

## 2024-10-17 NOTE — TELEPHONE ENCOUNTER
Submitted PA for Omeprazole 40MG dr capsules   Via [facility] Key: [id number]  STATUS: PENDING.    Follow up done daily; if no decision with in three days we will refax.  If another three days goes by with no decision will call the insurance for status.

## 2024-10-21 NOTE — TELEPHONE ENCOUNTER
The medication was DENIED; DENIAL letter is uploaded to MEDIA.    Generic Denial: Patient must complete step therapy, Unless there is a contraindication that you can provide.     Note :  Per your health plan's criteria, more than 1 capsule per day is covered if you meet the following: (1) One of the following: (A) You have failed treatment with a once daily proton pump inhibitor. (B) A once daily proton pump inhibitor is not appropriate for your condition. The information provided does not show that you meet the criteria listed above. Please speak with your doctor about your options. Reviewed by: Whitney butt.       If you want an APPEAL; please note in this encounter what new information you would like to APPEAL with.  Once complete route back to PA POOL.    If this requires a response please respond to the pool ( P [facility] MEDICATION PRE-AUTH).      Thank you please advise patient.

## 2024-10-22 NOTE — TELEPHONE ENCOUNTER
An Appeal has been faxed in for Omeprazole 40mg capsule.  Appeals normally take 30 days to come back with a decision, but follow up will be done every 10 days.    If no response by the 30th day, then the insurance will be called to check status.    If this requires a response please respond to the pool ( P [facility] MEDICATION PRE-AUTH).      Thank you please advise patient.

## 2024-11-07 NOTE — TELEPHONE ENCOUNTER
I called to check stats of the appeal     Appeal is overturned to approved through 10/22/2025    If this requires a response please respond to the pool. (P [facility] MEDICINE Pre-Auth).    Please advise patient thank you.

## 2024-12-01 DIAGNOSIS — G47.00 INSOMNIA, UNSPECIFIED TYPE: ICD-10-CM

## 2024-12-02 RX ORDER — DULOXETIN HYDROCHLORIDE 60 MG/1
60 CAPSULE, DELAYED RELEASE ORAL 2 TIMES DAILY
Qty: 180 CAPSULE | Refills: 3 | Status: SHIPPED | OUTPATIENT
Start: 2024-12-02

## 2024-12-02 RX ORDER — HYDROCHLOROTHIAZIDE 25 MG/1
25 TABLET ORAL DAILY
Qty: 90 TABLET | Refills: 3 | Status: SHIPPED | OUTPATIENT
Start: 2024-12-02

## 2024-12-02 RX ORDER — GABAPENTIN 100 MG/1
CAPSULE ORAL
Qty: 270 CAPSULE | Refills: 3 | Status: SHIPPED | OUTPATIENT
Start: 2024-12-02 | End: 2025-12-02

## 2024-12-02 RX ORDER — TRAZODONE HYDROCHLORIDE 150 MG/1
150 TABLET ORAL NIGHTLY
Qty: 90 TABLET | Refills: 3 | Status: SHIPPED | OUTPATIENT
Start: 2024-12-02

## 2024-12-02 RX ORDER — FENOFIBRATE 160 MG/1
160 TABLET ORAL DAILY
Qty: 90 TABLET | Refills: 3 | Status: SHIPPED | OUTPATIENT
Start: 2024-12-02

## 2024-12-02 NOTE — TELEPHONE ENCOUNTER
Last appointment: 10/4/2024  Next appointment: 4/4/2025  Last refill: 12/1/23  Requested Prescriptions     Pending Prescriptions Disp Refills    gabapentin (NEURONTIN) 100 MG capsule [Pharmacy Med Name: Gabapentin 100 MG Oral Capsule] 270 capsule 3     Sig: TAKE 1 CAPSULE BY MOUTH 3 TIMES  DAILY    hydroCHLOROthiazide (HYDRODIURIL) 25 MG tablet [Pharmacy Med Name: hydroCHLOROthiazide 25 MG Oral Tablet] 90 tablet 3     Sig: TAKE 1 TABLET BY MOUTH DAILY    traZODone (DESYREL) 150 MG tablet [Pharmacy Med Name: traZODone HCl 150 MG Oral Tablet] 90 tablet 3     Sig: TAKE 1 TABLET BY MOUTH EVERY  NIGHT    fenofibrate 160 MG tablet [Pharmacy Med Name: Fenofibrate 160 MG Oral Tablet] 90 tablet 3     Sig: TAKE 1 TABLET BY MOUTH DAILY    DULoxetine (CYMBALTA) 60 MG extended release capsule [Pharmacy Med Name: DULoxetine HCl 60 MG Oral Capsule Delayed Release Particles] 180 capsule 3     Sig: TAKE 1 CAPSULE BY MOUTH TWICE  DAILY

## 2025-03-24 ENCOUNTER — PATIENT MESSAGE (OUTPATIENT)
Dept: INTERNAL MEDICINE CLINIC | Age: 60
End: 2025-03-24

## 2025-03-24 DIAGNOSIS — M10.9 PODAGRA: ICD-10-CM

## 2025-03-24 RX ORDER — INDOMETHACIN 50 MG/1
50 CAPSULE ORAL
Qty: 30 CAPSULE | Refills: 3 | Status: SHIPPED | OUTPATIENT
Start: 2025-03-24 | End: 2025-05-03

## 2025-04-04 ENCOUNTER — OFFICE VISIT (OUTPATIENT)
Dept: INTERNAL MEDICINE CLINIC | Age: 60
End: 2025-04-04
Payer: COMMERCIAL

## 2025-04-04 VITALS
DIASTOLIC BLOOD PRESSURE: 78 MMHG | WEIGHT: 240 LBS | SYSTOLIC BLOOD PRESSURE: 122 MMHG | HEART RATE: 86 BPM | BODY MASS INDEX: 43.2 KG/M2 | OXYGEN SATURATION: 97 %

## 2025-04-04 DIAGNOSIS — E83.42 HYPOMAGNESEMIA: ICD-10-CM

## 2025-04-04 DIAGNOSIS — I10 BENIGN ESSENTIAL HTN: ICD-10-CM

## 2025-04-04 DIAGNOSIS — R73.01 IMPAIRED FASTING GLUCOSE: ICD-10-CM

## 2025-04-04 DIAGNOSIS — M10.9 GOUT, UNSPECIFIED CAUSE, UNSPECIFIED CHRONICITY, UNSPECIFIED SITE: ICD-10-CM

## 2025-04-04 DIAGNOSIS — E66.01 CLASS 3 SEVERE OBESITY DUE TO EXCESS CALORIES WITH SERIOUS COMORBIDITY AND BODY MASS INDEX (BMI) OF 40.0 TO 44.9 IN ADULT: ICD-10-CM

## 2025-04-04 DIAGNOSIS — Z23 NEED FOR SHINGLES VACCINE: ICD-10-CM

## 2025-04-04 DIAGNOSIS — E66.813 CLASS 3 SEVERE OBESITY DUE TO EXCESS CALORIES WITH SERIOUS COMORBIDITY AND BODY MASS INDEX (BMI) OF 40.0 TO 44.9 IN ADULT: ICD-10-CM

## 2025-04-04 DIAGNOSIS — G47.33 OSA (OBSTRUCTIVE SLEEP APNEA): Primary | ICD-10-CM

## 2025-04-04 DIAGNOSIS — E78.5 DYSLIPIDEMIA: ICD-10-CM

## 2025-04-04 DIAGNOSIS — R53.82 CHRONIC FATIGUE: ICD-10-CM

## 2025-04-04 DIAGNOSIS — G35 MULTIPLE SCLEROSIS (HCC): ICD-10-CM

## 2025-04-04 PROBLEM — N20.1 URETEROLITHIASIS: Status: RESOLVED | Noted: 2018-12-16 | Resolved: 2025-04-04

## 2025-04-04 PROBLEM — N20.0 BILATERAL KIDNEY STONES: Status: RESOLVED | Noted: 2018-12-27 | Resolved: 2025-04-04

## 2025-04-04 PROCEDURE — 90750 HZV VACC RECOMBINANT IM: CPT | Performed by: INTERNAL MEDICINE

## 2025-04-04 PROCEDURE — 3078F DIAST BP <80 MM HG: CPT | Performed by: INTERNAL MEDICINE

## 2025-04-04 PROCEDURE — 99214 OFFICE O/P EST MOD 30 MIN: CPT | Performed by: INTERNAL MEDICINE

## 2025-04-04 PROCEDURE — 3074F SYST BP LT 130 MM HG: CPT | Performed by: INTERNAL MEDICINE

## 2025-04-04 PROCEDURE — 90471 IMMUNIZATION ADMIN: CPT | Performed by: INTERNAL MEDICINE

## 2025-04-04 RX ORDER — MODAFINIL 200 MG/1
200 TABLET ORAL DAILY
Qty: 90 TABLET | Refills: 0 | Status: SHIPPED | OUTPATIENT
Start: 2025-04-04 | End: 2025-07-03

## 2025-04-04 SDOH — ECONOMIC STABILITY: FOOD INSECURITY: WITHIN THE PAST 12 MONTHS, THE FOOD YOU BOUGHT JUST DIDN'T LAST AND YOU DIDN'T HAVE MONEY TO GET MORE.: NEVER TRUE

## 2025-04-04 SDOH — ECONOMIC STABILITY: FOOD INSECURITY: WITHIN THE PAST 12 MONTHS, YOU WORRIED THAT YOUR FOOD WOULD RUN OUT BEFORE YOU GOT MONEY TO BUY MORE.: NEVER TRUE

## 2025-04-04 ASSESSMENT — PATIENT HEALTH QUESTIONNAIRE - PHQ9
SUM OF ALL RESPONSES TO PHQ QUESTIONS 1-9: 8
8. MOVING OR SPEAKING SO SLOWLY THAT OTHER PEOPLE COULD HAVE NOTICED. OR THE OPPOSITE, BEING SO FIGETY OR RESTLESS THAT YOU HAVE BEEN MOVING AROUND A LOT MORE THAN USUAL: SEVERAL DAYS
6. FEELING BAD ABOUT YOURSELF - OR THAT YOU ARE A FAILURE OR HAVE LET YOURSELF OR YOUR FAMILY DOWN: NOT AT ALL
SUM OF ALL RESPONSES TO PHQ QUESTIONS 1-9: 8
3. TROUBLE FALLING OR STAYING ASLEEP: NEARLY EVERY DAY
10. IF YOU CHECKED OFF ANY PROBLEMS, HOW DIFFICULT HAVE THESE PROBLEMS MADE IT FOR YOU TO DO YOUR WORK, TAKE CARE OF THINGS AT HOME, OR GET ALONG WITH OTHER PEOPLE: SOMEWHAT DIFFICULT
4. FEELING TIRED OR HAVING LITTLE ENERGY: NEARLY EVERY DAY
1. LITTLE INTEREST OR PLEASURE IN DOING THINGS: NOT AT ALL
7. TROUBLE CONCENTRATING ON THINGS, SUCH AS READING THE NEWSPAPER OR WATCHING TELEVISION: SEVERAL DAYS
2. FEELING DOWN, DEPRESSED OR HOPELESS: NOT AT ALL
9. THOUGHTS THAT YOU WOULD BE BETTER OFF DEAD, OR OF HURTING YOURSELF: NOT AT ALL
SUM OF ALL RESPONSES TO PHQ QUESTIONS 1-9: 8
SUM OF ALL RESPONSES TO PHQ QUESTIONS 1-9: 8
5. POOR APPETITE OR OVEREATING: NOT AT ALL

## 2025-04-04 NOTE — PROGRESS NOTES
[facility] Physicians  Internal Medicine  Patient Encounter  Cheko Carpio D.O., [facility]        Chief Complaint   Patient presents with    Check-Up     6 mo    Multiple Sclerosis     Patient's Neurologist [Dr. Singer] passed away x 5 months ago. She has not had her Provigil x 3 months & she is exhausted. No one will respond to her from [facility].        HPI: 59 y.o. female seen today requesting her routine checkup regarding the status of current chronic medical problems listed below along with a medication review and reconciliation.      Patient has multiple medical problems including but not limited to hypertension, impaired fasting glucose, asthma, depression, anxiety, adjustment disorder, fibromyalgia, multiple sclerosis, hyperlipidemia, GERD, sleep apnea, asthma.    Health maintenance items overdue:  COVID-19 vaccine  Shingles vaccine  RSV vaccine  Mammogram     Patient was given an order for mammogram back in 2024.      Pt states her neurologist  (Dr. Singer).  She is looking to see a new neurologist, but their office has not called her.  Pt did have an appointment, but this was cancelled.      Pt does have tiredness.  This has increased due to not having her Modafanil.  She was using this for chronic fatigue associated with her MS.  She also has DEVIN and is on CPAP.  She is not 100% compliant.  She has mask issues.  She is not a candidate for Inspire due to weight.   She has been trying to change diet to low calorie, more vegetables, less processed foods.  She does not exercise because she is too tired.  She struggles losing weight.  She has tried Weight Watchers in the past which was not successful.  She has not tried any other commercial weight loss programs.  She is interested in GLP-1 agonist.  She has a family history of Obesity.       She denies CP, SOB, wheezing, cough, abd pain, N/V/D, hematochezia, melena.    She states her depression and anxiety are under good control.  Medications are

## 2025-04-05 LAB
ALBUMIN SERPL-MCNC: 4.4 G/DL (ref 3.4–5)
ALBUMIN/GLOB SERPL: 1.3 {RATIO} (ref 1.1–2.2)
ALP SERPL-CCNC: 55 U/L (ref 40–129)
ALT SERPL-CCNC: 90 U/L (ref 10–40)
ANION GAP SERPL CALCULATED.3IONS-SCNC: 12 MMOL/L (ref 3–16)
AST SERPL-CCNC: 84 U/L (ref 15–37)
BASOPHILS # BLD: 0.1 K/UL (ref 0–0.2)
BASOPHILS NFR BLD: 1.9 %
BILIRUB SERPL-MCNC: 0.6 MG/DL (ref 0–1)
BUN SERPL-MCNC: 17 MG/DL (ref 7–20)
CALCIUM SERPL-MCNC: 9.7 MG/DL (ref 8.3–10.6)
CHLORIDE SERPL-SCNC: 96 MMOL/L (ref 99–110)
CHOLEST SERPL-MCNC: 197 MG/DL (ref 0–199)
CO2 SERPL-SCNC: 25 MMOL/L (ref 21–32)
CREAT SERPL-MCNC: 1.1 MG/DL (ref 0.6–1.2)
DEPRECATED RDW RBC AUTO: 13.7 % (ref 12.4–15.4)
EOSINOPHIL # BLD: 0.3 K/UL (ref 0–0.6)
EOSINOPHIL NFR BLD: 3.6 %
EST. AVERAGE GLUCOSE BLD GHB EST-MCNC: 105.4 MG/DL
GFR SERPLBLD CREATININE-BSD FMLA CKD-EPI: 57 ML/MIN/{1.73_M2}
GLUCOSE SERPL-MCNC: 88 MG/DL (ref 70–99)
HBA1C MFR BLD: 5.3 %
HCT VFR BLD AUTO: 43 % (ref 36–48)
HDLC SERPL-MCNC: 39 MG/DL (ref 40–60)
HGB BLD-MCNC: 14.9 G/DL (ref 12–16)
LDLC SERPL CALC-MCNC: 108 MG/DL
LYMPHOCYTES # BLD: 2.7 K/UL (ref 1–5.1)
LYMPHOCYTES NFR BLD: 36.1 %
MAGNESIUM SERPL-MCNC: 1.52 MG/DL (ref 1.8–2.4)
MCH RBC QN AUTO: 31.5 PG (ref 26–34)
MCHC RBC AUTO-ENTMCNC: 34.7 G/DL (ref 31–36)
MCV RBC AUTO: 90.7 FL (ref 80–100)
MONOCYTES # BLD: 0.7 K/UL (ref 0–1.3)
MONOCYTES NFR BLD: 9.7 %
NEUTROPHILS # BLD: 3.6 K/UL (ref 1.7–7.7)
NEUTROPHILS NFR BLD: 48.7 %
PLATELET # BLD AUTO: 251 K/UL (ref 135–450)
PMV BLD AUTO: 8.6 FL (ref 5–10.5)
POTASSIUM SERPL-SCNC: 4.2 MMOL/L (ref 3.5–5.1)
PROT SERPL-MCNC: 7.8 G/DL (ref 6.4–8.2)
RBC # BLD AUTO: 4.74 M/UL (ref 4–5.2)
SODIUM SERPL-SCNC: 133 MMOL/L (ref 136–145)
TRIGL SERPL-MCNC: 251 MG/DL (ref 0–150)
TSH SERPL DL<=0.005 MIU/L-ACNC: 2.48 UIU/ML (ref 0.27–4.2)
URATE SERPL-MCNC: 7.7 MG/DL (ref 2.6–6)
VLDLC SERPL CALC-MCNC: 50 MG/DL
WBC # BLD AUTO: 7.4 K/UL (ref 4–11)

## 2025-04-06 ENCOUNTER — RESULTS FOLLOW-UP (OUTPATIENT)
Dept: INTERNAL MEDICINE CLINIC | Age: 60
End: 2025-04-06

## 2025-04-07 DIAGNOSIS — M10.9 GOUT, UNSPECIFIED CAUSE, UNSPECIFIED CHRONICITY, UNSPECIFIED SITE: Primary | ICD-10-CM

## 2025-04-07 RX ORDER — ALLOPURINOL 100 MG/1
100 TABLET ORAL DAILY
Qty: 90 TABLET | Refills: 1 | Status: SHIPPED | OUTPATIENT
Start: 2025-04-07

## 2025-04-08 ENCOUNTER — TELEPHONE (OUTPATIENT)
Dept: INTERNAL MEDICINE CLINIC | Age: 60
End: 2025-04-08

## 2025-04-08 NOTE — TELEPHONE ENCOUNTER
Per patient please start a PA on   tirzepatide-weight management (ZEPBOUND) 2.5 MG/0.5ML SOAJ subCUTAneous auto-injector pen

## 2025-04-09 NOTE — TELEPHONE ENCOUNTER
Submitted PA for Modanifil  Via [facility] Key: [id number] STATUS: PENDING.    Follow up done daily; if no decision with in three days we will refax.  If another three days goes by with no decision will call the insurance for status.

## 2025-04-10 NOTE — TELEPHONE ENCOUNTER
[facility] does not review case.    RESubmitted PA for Modafinil 200MG tablets   Via [facility] Key: [id number] to [facility]  STATUS: Per CM [employer] Patient eligibility could not be verified against date of service. Please review date of service.     RESubmitted PA for Modafinil 200MG tablets , via FAX to [facility] . STATUS: PENDING .     Sarecycline Counseling: Patient advised regarding possible photosensitivity and discoloration of the teeth, skin, lips, tongue and gums.  Patient instructed to avoid sunlight, if possible.  When exposed to sunlight, patients should wear protective clothing, sunglasses, and sunscreen.  The patient was instructed to call the office immediately if the following severe adverse effects occur:  hearing changes, easy bruising/bleeding, severe headache, or vision changes.  The patient verbalized understanding of the proper use and possible adverse effects of sarecycline.  All of the patient's questions and concerns were addressed.

## 2025-05-28 DIAGNOSIS — Z78.0 MENOPAUSE: ICD-10-CM

## 2025-05-28 RX ORDER — CLONIDINE HYDROCHLORIDE 0.2 MG/1
0.2 TABLET ORAL DAILY
Qty: 90 TABLET | Refills: 3 | OUTPATIENT
Start: 2025-05-28

## 2025-05-28 NOTE — TELEPHONE ENCOUNTER
Last appointment: 4/4/2025  Next appointment: Visit date not found  Return in about 1 month (around 5/4/2025) for Short follow up for specific problem.     Sent Belly message to schedule due/overdue appointment.       Last refill:

## 2025-05-29 RX ORDER — PROPRANOLOL HYDROCHLORIDE 80 MG/1
80 CAPSULE, EXTENDED RELEASE ORAL DAILY
Qty: 90 CAPSULE | Refills: 3 | Status: SHIPPED | OUTPATIENT
Start: 2025-05-29

## 2025-05-29 RX ORDER — BUDESONIDE AND FORMOTEROL FUMARATE DIHYDRATE 160; 4.5 UG/1; UG/1
AEROSOL RESPIRATORY (INHALATION)
Qty: 30.6 G | Refills: 3 | Status: SHIPPED | OUTPATIENT
Start: 2025-05-29

## 2025-05-29 RX ORDER — ALBUTEROL SULFATE 90 UG/1
INHALANT RESPIRATORY (INHALATION)
Qty: 34 G | Refills: 3 | Status: SHIPPED | OUTPATIENT
Start: 2025-05-29

## 2025-07-15 ENCOUNTER — TRANSCRIBE ORDERS (OUTPATIENT)
Dept: ADMINISTRATIVE | Age: 60
End: 2025-07-15

## 2025-07-15 DIAGNOSIS — R26.89 IMBALANCE: ICD-10-CM

## 2025-07-15 DIAGNOSIS — M54.16 LUMBAR RADICULOPATHY: Primary | ICD-10-CM

## 2025-07-15 DIAGNOSIS — R32 BOWEL AND BLADDER INCONTINENCE: ICD-10-CM

## 2025-07-15 DIAGNOSIS — R32 URINARY INCONTINENCE, UNSPECIFIED TYPE: ICD-10-CM

## 2025-07-15 DIAGNOSIS — R15.9 BOWEL AND BLADDER INCONTINENCE: ICD-10-CM

## 2025-07-30 ENCOUNTER — HOSPITAL ENCOUNTER (OUTPATIENT)
Dept: MRI IMAGING | Age: 60
Discharge: HOME | End: 2025-07-30
Payer: COMMERCIAL

## 2025-07-30 DIAGNOSIS — R26.89 IMBALANCE: ICD-10-CM

## 2025-07-30 DIAGNOSIS — R32 URINARY INCONTINENCE, UNSPECIFIED TYPE: ICD-10-CM

## 2025-07-30 DIAGNOSIS — M54.16 LUMBAR RADICULOPATHY: ICD-10-CM

## 2025-07-30 DIAGNOSIS — R32 BOWEL AND BLADDER INCONTINENCE: ICD-10-CM

## 2025-07-30 DIAGNOSIS — R15.9 BOWEL AND BLADDER INCONTINENCE: ICD-10-CM

## 2025-07-30 PROCEDURE — 72141 MRI NECK SPINE W/O DYE: CPT

## 2025-07-30 PROCEDURE — 72146 MRI CHEST SPINE W/O DYE: CPT

## 2025-07-30 PROCEDURE — 72148 MRI LUMBAR SPINE W/O DYE: CPT

## 2025-08-13 DIAGNOSIS — Z78.0 MENOPAUSE: ICD-10-CM

## 2025-08-13 DIAGNOSIS — G35 MULTIPLE SCLEROSIS (HCC): ICD-10-CM

## 2025-08-13 RX ORDER — CLONIDINE HYDROCHLORIDE 0.2 MG/1
0.2 TABLET ORAL DAILY
Qty: 90 TABLET | Refills: 3 | Status: SHIPPED | OUTPATIENT
Start: 2025-08-13

## 2025-08-13 RX ORDER — BACLOFEN 20 MG/1
20 TABLET ORAL 3 TIMES DAILY
Qty: 270 TABLET | Refills: 0 | Status: SHIPPED | OUTPATIENT
Start: 2025-08-13

## 2025-09-02 DIAGNOSIS — G35 MULTIPLE SCLEROSIS (HCC): ICD-10-CM

## 2025-09-02 RX ORDER — BACLOFEN 20 MG/1
20 TABLET ORAL 3 TIMES DAILY
Qty: 270 TABLET | Refills: 0 | Status: SHIPPED | OUTPATIENT
Start: 2025-09-02